# Patient Record
Sex: MALE | Race: BLACK OR AFRICAN AMERICAN | Employment: UNEMPLOYED | URBAN - METROPOLITAN AREA
[De-identification: names, ages, dates, MRNs, and addresses within clinical notes are randomized per-mention and may not be internally consistent; named-entity substitution may affect disease eponyms.]

---

## 2018-01-01 ENCOUNTER — HOSPITAL ENCOUNTER (EMERGENCY)
Age: 58
Discharge: HOME | End: 2018-01-01
Attending: EMERGENCY MEDICINE
Payer: SELF-PAY

## 2018-01-01 VITALS
HEART RATE: 80 BPM | OXYGEN SATURATION: 100 % | BODY MASS INDEX: 20.09 KG/M2 | SYSTOLIC BLOOD PRESSURE: 118 MMHG | WEIGHT: 125 LBS | RESPIRATION RATE: 18 BRPM | HEIGHT: 66 IN | DIASTOLIC BLOOD PRESSURE: 78 MMHG | TEMPERATURE: 97.7 F

## 2018-01-01 DIAGNOSIS — Z23 NEED FOR TETANUS BOOSTER: ICD-10-CM

## 2018-01-01 DIAGNOSIS — S01.81XA FACE LACERATIONS, INITIAL ENCOUNTER: Primary | ICD-10-CM

## 2018-01-01 PROCEDURE — 74011250636 HC RX REV CODE- 250/636: Performed by: PHYSICIAN ASSISTANT

## 2018-01-01 PROCEDURE — 99282 EMERGENCY DEPT VISIT SF MDM: CPT

## 2018-01-01 PROCEDURE — 90471 IMMUNIZATION ADMIN: CPT

## 2018-01-01 PROCEDURE — 74011250637 HC RX REV CODE- 250/637: Performed by: PHYSICIAN ASSISTANT

## 2018-01-01 PROCEDURE — 77030018836 HC SOL IRR NACL ICUM -A

## 2018-01-01 PROCEDURE — 90715 TDAP VACCINE 7 YRS/> IM: CPT | Performed by: PHYSICIAN ASSISTANT

## 2018-01-01 PROCEDURE — 77030031132 HC SUT NYL COVD -A

## 2018-01-01 PROCEDURE — 74011000250 HC RX REV CODE- 250: Performed by: PHYSICIAN ASSISTANT

## 2018-01-01 PROCEDURE — 75810000293 HC SIMP/SUPERF WND  RPR

## 2018-01-01 RX ORDER — LIDOCAINE HYDROCHLORIDE AND EPINEPHRINE 20; 5 MG/ML; UG/ML
15 INJECTION, SOLUTION EPIDURAL; INFILTRATION; INTRACAUDAL; PERINEURAL ONCE
Status: COMPLETED | OUTPATIENT
Start: 2018-01-01 | End: 2018-01-01

## 2018-01-01 RX ADMIN — TETANUS TOXOID, REDUCED DIPHTHERIA TOXOID AND ACELLULAR PERTUSSIS VACCINE, ADSORBED 0.5 ML: 5; 2.5; 8; 8; 2.5 SUSPENSION INTRAMUSCULAR at 17:36

## 2018-01-01 RX ADMIN — BACITRACIN ZINC, NEOMYCIN SULFATE, POLYMYXIN B SULFATE 1 PACKET: 3.5; 5000; 4 OINTMENT TOPICAL at 17:36

## 2018-01-01 RX ADMIN — LIDOCAINE HYDROCHLORIDE,EPINEPHRINE BITARTRATE 300 MG: 20; .005 INJECTION, SOLUTION EPIDURAL; INFILTRATION; INTRACAUDAL; PERINEURAL at 17:36

## 2018-01-01 NOTE — DISCHARGE INSTRUCTIONS
Cuts Closed With Stitches: Care Instructions  Your Care Instructions  A cut can happen anywhere on your body. The doctor used stitches to close the cut. Using stitches also helps the cut heal and reduces scarring. Sometimes pieces of tape called Steri-Strips are put over the stitches. If the cut went deep and through the skin, the doctor may have put in two layers of stitches. The deeper layer brings the deep part of the cut together. These stitches will dissolve and don't need to be removed. The stitches in the upper layer are the ones you see on the cut. You will probably have a bandage over the stitches. You will need to have the stitches removed, usually in 7 to 14 days. The doctor has checked you carefully, but problems can develop later. If you notice any problems or new symptoms, get medical treatment right away. Follow-up care is a key part of your treatment and safety. Be sure to make and go to all appointments, and call your doctor if you are having problems. It's also a good idea to know your test results and keep a list of the medicines you take. How can you care for yourself at home? · Keep the cut dry for the first 24 to 48 hours. After this, you can shower if your doctor okays it. Pat the cut dry. · Don't soak the cut, such as in a bathtub. Your doctor will tell you when it's safe to get the cut wet. · If your doctor told you how to care for your cut, follow your doctor's instructions. If you did not get instructions, follow this general advice:  ¨ After the first 24 to 48 hours, wash around the cut with clean water 2 times a day. Don't use hydrogen peroxide or alcohol, which can slow healing. ¨ You may cover the cut with a thin layer of petroleum jelly, such as Vaseline, and a nonstick bandage. ¨ Apply more petroleum jelly and replace the bandage as needed. · Prop up the sore area on a pillow anytime you sit or lie down during the next 3 days.  Try to keep it above the level of your heart. This will help reduce swelling. · Avoid any activity that could cause your cut to reopen. · Do not remove the stitches on your own. Your doctor will tell you when to come back to have the stitches removed. · Leave Steri-Strips on until they fall off. · Be safe with medicines. Read and follow all instructions on the label. ¨ If the doctor gave you a prescription medicine for pain, take it as prescribed. ¨ If you are not taking a prescription pain medicine, ask your doctor if you can take an over-the-counter medicine. When should you call for help? Call your doctor now or seek immediate medical care if:  ? · You have new pain, or your pain gets worse. ? · The skin near the cut is cold or pale or changes color. ? · You have tingling, weakness, or numbness near the cut.   ? · The cut starts to bleed, and blood soaks through the bandage. Oozing small amounts of blood is normal.   ? · You have trouble moving the area near the cut.   ? · You have symptoms of infection, such as:  ¨ Increased pain, swelling, warmth, or redness around the cut. ¨ Red streaks leading from the cut. ¨ Pus draining from the cut. ¨ A fever. ? Watch closely for changes in your health, and be sure to contact your doctor if:  ? · The cut reopens. ? · You do not get better as expected. Where can you learn more? Go to http://jerzy-barrett.info/. Enter R217 in the search box to learn more about \"Cuts Closed With Stitches: Care Instructions. \"  Current as of: March 20, 2017  Content Version: 11.4  © 5293-3310 [email or web address]. Care instructions adapted under license by [employer] (which disclaims liability or warranty for this information). If you have questions about a medical condition or this instruction, always ask your healthcare professional. [address] any warranty or liability for your use of this information.

## 2018-01-01 NOTE — ED NOTES
Pt reports tripping over his niece's backpack at home today and falling on a heater, pt has approx 3 cm semi-circular laceration to left side of face and 1.5 cm jagged laceration to left chin      Emergency Department Nursing Plan of Care       The Nursing Plan of Care is developed from the Nursing assessment and Emergency Department Attending provider initial evaluation. The plan of care may be reviewed in the ED Provider note.     The Plan of Care was developed with the following considerations:   Patient / Family readiness to learn indicated by:verbalized understanding  Persons(s) to be included in education: patient  Barriers to Learning/Limitations:No    Signed     Tacho Caballero RN    1/1/2018   4:43 PM

## 2018-01-01 NOTE — ED PROVIDER NOTES
EMERGENCY DEPARTMENT HISTORY AND PHYSICAL EXAM    Date: 1/1/2018  Patient Name: Leonidas Le    History of Presenting Illness     Chief Complaint   Patient presents with    Laceration     to chin and left side of face, bleeding controlled         History Provided By: Patient    Chief Complaint: lacerations to face  Duration: one  Days  Timing:  Acute  Location: left face and left chin  Quality: Aching  Severity: Moderate  Modifying Factors: none identified  Associated Symptoms: bleeding, now controlled      HPI: Leonidas Le is a 62 y.o. male with a PMH of No significant past medical history who presents with facial lacerations. Just prior to arrival, had GLF (tripped over danielle bookbag) and struck left face on edge of heater. No loc. No neck pain. Denies numbness and tingling. Initially had bleeding, easily controlled. Denies pain to teeth. Denies wound to inner mouth. Unsure of last tetanus. Some moderate discomfort at lac sites. PCP: Cici Teresa MD        Past History     Past Medical History:  History reviewed. No pertinent past medical history. Past Surgical History:  Past Surgical History:   Procedure Laterality Date    HX OTHER SURGICAL      \"surgery for my lung\"       Family History:  History reviewed. No pertinent family history. Social History:  Social History   Substance Use Topics    Smoking status: Current Some Day Smoker    Smokeless tobacco: None    Alcohol use No       Allergies:  No Known Allergies      Review of Systems   Review of Systems   Constitutional: Negative for chills and fever. HENT: Negative for dental problem and sore throat. Eyes: Negative for pain. Respiratory: Negative for cough. Cardiovascular: Negative for chest pain. Gastrointestinal: Negative for nausea and vomiting. Musculoskeletal: Negative for gait problem and neck pain. Skin: Positive for wound. Neurological: Negative for seizures and headaches.        Physical Exam     Vitals: 01/01/18 1635   BP: 118/78   Pulse: 80   Resp: 18   Temp: 97.7 °F (36.5 °C)   SpO2: 100%   Weight: 56.7 kg (125 lb)   Height: 5' 6\" (1.676 m)     Physical Exam   Constitutional: He is oriented to person, place, and time. He appears well-developed and well-nourished. No distress. HENT:   Head: Normocephalic. Right Ear: External ear normal.   Left Ear: External ear normal.   Mouth/Throat: Oropharynx is clear and moist. No oropharyngeal exudate. No hemotympanum  No inner mouth injury. No loose teeth or dental tenderness. No TMJ tenderness. Facial bones and skull without tenderness or crepitus. Left face with 1.5 cm soft mobile cystic mass  Mid face. (pt sts chronic)   Eyes: Pupils are equal, round, and reactive to light. Right eye exhibits no discharge. Left eye exhibits no discharge. Neck: Normal range of motion. Cardiovascular: Normal rate. No murmur heard. Pulmonary/Chest: Effort normal. He has no wheezes. Neurological: He is alert and oriented to person, place, and time. No cranial nerve deficit. Sensation to face intact. No facial droop. Skin: He is not diaphoretic. Left lower face with 4cm semicircular laceration. No bleeding. Left lower face with 2.5 cm laceration with irregular lateral edge. Bleeding controlled. Sensation throughout face intact. EOMI PEARLA   Psychiatric: He has a normal mood and affect. His behavior is normal.   Nursing note and vitals reviewed. Diagnostic Study Results         Medical Decision Making   I am the first provider for this patient. I reviewed the vital signs, available nursing notes, past medical history, past surgical history, family history and social history. Vital Signs-Reviewed the patient's vital signs. Records Reviewed: Nursing Notes    ED Course:     Disposition:  home    DISCHARGE NOTE:       Care plan outlined and precautions discussed. Patient has no new complaints, changes, or physical findings.   Results of exam were reviewed with the patient. All medications were reviewed with the patient; will d/c home with local wound care, which was reviewed. All of pt's questions and concerns were addressed. Patient was instructed and agrees to follow up with pcp as needed, as well as to return to the ED for suture removal in one week and upon further deterioration. Patient is ready to go home. Follow-up Information     Follow up With Details Comments Contact Info    [facility] EMERGENCY DEPT On 1/7/2018 For suture removal  [address]          There are no discharge medications for this patient. Provider Notes (Medical Decision Making):     Procedures:  Wound Repair  Date/Time: 1/1/2018 7:12 PM  Performed by: PAPreparation: skin prepped with Betadine  Location: Left lower face. Wound length:2.6 - 7.5 cm  Anesthesia: local infiltration    Anesthesia:  Local Anesthetic: lidocaine 2% with epinephrine  Anesthetic total (ml): 3cc. Foreign bodies: no foreign bodies  Irrigation solution: saline  Irrigation method: syringe  Debridement: minimal  Skin closure: 6-0 nylon  Number of sutures: 7  Approximation: close  Dressing: antibiotic ointment  Patient tolerance: Patient tolerated the procedure well with no immediate complications  My total time at bedside, performing this procedure was 16-30 minutes. Wound Repair  Date/Time: 1/1/2018 7:15 PM  Performed by: PAPreparation: skin prepped with Betadine  Location: left chin.   Wound length:2.5 cm or less  Anesthesia: local infiltration    Anesthesia:  Local Anesthetic: lidocaine 2% with epinephrine  Foreign bodies: no foreign bodies  Irrigation solution: saline  Irrigation method: syringe  Debridement: none  Skin closure: 6-0 nylon  Technique: simple  Approximation: close  Dressing: antibiotic ointment  Patient tolerance: Patient tolerated the procedure well with no immediate complications  My total time at bedside, performing this procedure was 1-15 minutes. Diagnosis     Clinical Impression:   1. Face lacerations, initial encounter    2.  Need for tetanus booster

## 2021-04-01 ENCOUNTER — HOSPITAL ENCOUNTER (OUTPATIENT)
Age: 61
Setting detail: OBSERVATION
Discharge: HOME | End: 2021-04-02
Attending: EMERGENCY MEDICINE | Admitting: HOSPITALIST
Payer: MEDICAID

## 2021-04-01 ENCOUNTER — APPOINTMENT (OUTPATIENT)
Dept: GENERAL RADIOLOGY | Age: 61
End: 2021-04-01
Attending: EMERGENCY MEDICINE
Payer: MEDICAID

## 2021-04-01 DIAGNOSIS — E83.42 HYPOMAGNESEMIA: ICD-10-CM

## 2021-04-01 DIAGNOSIS — J44.1 COPD EXACERBATION (HCC): Primary | ICD-10-CM

## 2021-04-01 LAB
ALBUMIN SERPL-MCNC: 3.2 G/DL (ref 3.5–5)
ALBUMIN/GLOB SERPL: 0.9 {RATIO} (ref 1.1–2.2)
ALP SERPL-CCNC: 66 U/L (ref 45–117)
ALT SERPL-CCNC: 29 U/L (ref 12–78)
AMPHET UR QL SCN: NEGATIVE
ANION GAP SERPL CALC-SCNC: 10 MMOL/L (ref 5–15)
APPEARANCE UR: CLEAR
AST SERPL-CCNC: 26 U/L (ref 15–37)
ATRIAL RATE: 83 BPM
BARBITURATES UR QL SCN: NEGATIVE
BASOPHILS # BLD: 0 K/UL (ref 0–0.1)
BASOPHILS NFR BLD: 1 % (ref 0–1)
BENZODIAZ UR QL: NEGATIVE
BILIRUB SERPL-MCNC: 0.3 MG/DL (ref 0.2–1)
BILIRUB UR QL: NEGATIVE
BUN SERPL-MCNC: 14 MG/DL (ref 6–20)
BUN/CREAT SERPL: 17 (ref 12–20)
CALCIUM SERPL-MCNC: 8.4 MG/DL (ref 8.5–10.1)
CALCULATED P AXIS, ECG09: 75 DEGREES
CALCULATED R AXIS, ECG10: 83 DEGREES
CALCULATED T AXIS, ECG11: 80 DEGREES
CANNABINOIDS UR QL SCN: NEGATIVE
CHLORIDE SERPL-SCNC: 105 MMOL/L (ref 97–108)
CO2 SERPL-SCNC: 26 MMOL/L (ref 21–32)
COCAINE UR QL SCN: POSITIVE
COLOR UR: ABNORMAL
CREAT SERPL-MCNC: 0.81 MG/DL (ref 0.7–1.3)
DIAGNOSIS, 93000: NORMAL
DIFFERENTIAL METHOD BLD: ABNORMAL
DRUG SCRN COMMENT,DRGCM: ABNORMAL
EOSINOPHIL # BLD: 0.5 K/UL (ref 0–0.4)
EOSINOPHIL NFR BLD: 11 % (ref 0–7)
ERYTHROCYTE [DISTWIDTH] IN BLOOD BY AUTOMATED COUNT: 12.8 % (ref 11.5–14.5)
ETHANOL SERPL-MCNC: <10 MG/DL
FLUAV RNA SPEC QL NAA+PROBE: NOT DETECTED
FLUBV RNA SPEC QL NAA+PROBE: NOT DETECTED
GLOBULIN SER CALC-MCNC: 3.7 G/DL (ref 2–4)
GLUCOSE SERPL-MCNC: 87 MG/DL (ref 65–100)
GLUCOSE UR STRIP.AUTO-MCNC: NEGATIVE MG/DL
HCT VFR BLD AUTO: 39.6 % (ref 36.6–50.3)
HGB BLD-MCNC: 13.6 G/DL (ref 12.1–17)
HGB UR QL STRIP: NEGATIVE
IMM GRANULOCYTES # BLD AUTO: 0 K/UL (ref 0–0.04)
IMM GRANULOCYTES NFR BLD AUTO: 0 % (ref 0–0.5)
KETONES UR QL STRIP.AUTO: NEGATIVE MG/DL
LEUKOCYTE ESTERASE UR QL STRIP.AUTO: NEGATIVE
LYMPHOCYTES # BLD: 2.2 K/UL (ref 0.8–3.5)
LYMPHOCYTES NFR BLD: 45 % (ref 12–49)
MAGNESIUM SERPL-MCNC: 1.4 MG/DL (ref 1.6–2.4)
MAGNESIUM SERPL-MCNC: 1.6 MG/DL (ref 1.6–2.4)
MCH RBC QN AUTO: 32.8 PG (ref 26–34)
MCHC RBC AUTO-ENTMCNC: 34.3 G/DL (ref 30–36.5)
MCV RBC AUTO: 95.4 FL (ref 80–99)
METHADONE UR QL: NEGATIVE
MONOCYTES # BLD: 0.5 K/UL (ref 0–1)
MONOCYTES NFR BLD: 11 % (ref 5–13)
NEUTS SEG # BLD: 1.6 K/UL (ref 1.8–8)
NEUTS SEG NFR BLD: 32 % (ref 32–75)
NITRITE UR QL STRIP.AUTO: NEGATIVE
NRBC # BLD: 0 K/UL (ref 0–0.01)
NRBC BLD-RTO: 0 PER 100 WBC
OPIATES UR QL: NEGATIVE
P-R INTERVAL, ECG05: 136 MS
PCP UR QL: NEGATIVE
PH UR STRIP: 7.5 [PH] (ref 5–8)
PLATELET # BLD AUTO: 303 K/UL (ref 150–400)
PMV BLD AUTO: 8.4 FL (ref 8.9–12.9)
POTASSIUM SERPL-SCNC: 3.8 MMOL/L (ref 3.5–5.1)
PROT SERPL-MCNC: 6.9 G/DL (ref 6.4–8.2)
PROT UR STRIP-MCNC: NEGATIVE MG/DL
Q-T INTERVAL, ECG07: 360 MS
QRS DURATION, ECG06: 70 MS
QTC CALCULATION (BEZET), ECG08: 423 MS
RBC # BLD AUTO: 4.15 M/UL (ref 4.1–5.7)
SARS-COV-2, COV2: NOT DETECTED
SODIUM SERPL-SCNC: 141 MMOL/L (ref 136–145)
SP GR UR REFRACTOMETRY: 1.02 (ref 1–1.03)
TROPONIN I SERPL-MCNC: <0.05 NG/ML
TROPONIN I SERPL-MCNC: <0.05 NG/ML
UROBILINOGEN UR QL STRIP.AUTO: 2 EU/DL (ref 0.2–1)
VENTRICULAR RATE, ECG03: 83 BPM
WBC # BLD AUTO: 4.9 K/UL (ref 4.1–11.1)

## 2021-04-01 PROCEDURE — 80307 DRUG TEST PRSMV CHEM ANLYZR: CPT

## 2021-04-01 PROCEDURE — 93005 ELECTROCARDIOGRAM TRACING: CPT

## 2021-04-01 PROCEDURE — 97116 GAIT TRAINING THERAPY: CPT | Performed by: PHYSICAL THERAPIST

## 2021-04-01 PROCEDURE — 83735 ASSAY OF MAGNESIUM: CPT

## 2021-04-01 PROCEDURE — 74011250637 HC RX REV CODE- 250/637: Performed by: STUDENT IN AN ORGANIZED HEALTH CARE EDUCATION/TRAINING PROGRAM

## 2021-04-01 PROCEDURE — 74011000250 HC RX REV CODE- 250: Performed by: EMERGENCY MEDICINE

## 2021-04-01 PROCEDURE — 81003 URINALYSIS AUTO W/O SCOPE: CPT

## 2021-04-01 PROCEDURE — 96365 THER/PROPH/DIAG IV INF INIT: CPT

## 2021-04-01 PROCEDURE — 77010033678 HC OXYGEN DAILY

## 2021-04-01 PROCEDURE — 85025 COMPLETE CBC W/AUTO DIFF WBC: CPT

## 2021-04-01 PROCEDURE — 74011000250 HC RX REV CODE- 250: Performed by: STUDENT IN AN ORGANIZED HEALTH CARE EDUCATION/TRAINING PROGRAM

## 2021-04-01 PROCEDURE — 94640 AIRWAY INHALATION TREATMENT: CPT

## 2021-04-01 PROCEDURE — 84484 ASSAY OF TROPONIN QUANT: CPT

## 2021-04-01 PROCEDURE — 74011250636 HC RX REV CODE- 250/636: Performed by: HOSPITALIST

## 2021-04-01 PROCEDURE — 71045 X-RAY EXAM CHEST 1 VIEW: CPT

## 2021-04-01 PROCEDURE — 36415 COLL VENOUS BLD VENIPUNCTURE: CPT

## 2021-04-01 PROCEDURE — 80053 COMPREHEN METABOLIC PANEL: CPT

## 2021-04-01 PROCEDURE — 99218 HC RM OBSERVATION: CPT

## 2021-04-01 PROCEDURE — 94762 N-INVAS EAR/PLS OXIMTRY CONT: CPT

## 2021-04-01 PROCEDURE — 74011250637 HC RX REV CODE- 250/637: Performed by: HOSPITALIST

## 2021-04-01 PROCEDURE — 99285 EMERGENCY DEPT VISIT HI MDM: CPT

## 2021-04-01 PROCEDURE — 96376 TX/PRO/DX INJ SAME DRUG ADON: CPT

## 2021-04-01 PROCEDURE — 74011250636 HC RX REV CODE- 250/636: Performed by: EMERGENCY MEDICINE

## 2021-04-01 PROCEDURE — 96361 HYDRATE IV INFUSION ADD-ON: CPT

## 2021-04-01 PROCEDURE — 87636 SARSCOV2 & INF A&B AMP PRB: CPT

## 2021-04-01 PROCEDURE — 82077 ASSAY SPEC XCP UR&BREATH IA: CPT

## 2021-04-01 PROCEDURE — 96375 TX/PRO/DX INJ NEW DRUG ADDON: CPT

## 2021-04-01 PROCEDURE — 97161 PT EVAL LOW COMPLEX 20 MIN: CPT | Performed by: PHYSICAL THERAPIST

## 2021-04-01 RX ORDER — MAGNESIUM SULFATE HEPTAHYDRATE 40 MG/ML
2 INJECTION, SOLUTION INTRAVENOUS
Status: COMPLETED | OUTPATIENT
Start: 2021-04-01 | End: 2021-04-01

## 2021-04-01 RX ORDER — ACETAMINOPHEN 325 MG/1
650 TABLET ORAL
Status: DISCONTINUED | OUTPATIENT
Start: 2021-04-01 | End: 2021-04-02 | Stop reason: HOSPADM

## 2021-04-01 RX ORDER — SODIUM CHLORIDE 0.9 % (FLUSH) 0.9 %
5-40 SYRINGE (ML) INJECTION AS NEEDED
Status: DISCONTINUED | OUTPATIENT
Start: 2021-04-01 | End: 2021-04-02 | Stop reason: HOSPADM

## 2021-04-01 RX ORDER — IPRATROPIUM BROMIDE AND ALBUTEROL SULFATE 2.5; .5 MG/3ML; MG/3ML
3 SOLUTION RESPIRATORY (INHALATION)
COMMUNITY

## 2021-04-01 RX ORDER — BUDESONIDE 0.5 MG/2ML
500 INHALANT ORAL
Status: DISCONTINUED | OUTPATIENT
Start: 2021-04-01 | End: 2021-04-01

## 2021-04-01 RX ORDER — ARFORMOTEROL TARTRATE 15 UG/2ML
15 SOLUTION RESPIRATORY (INHALATION)
Status: DISCONTINUED | OUTPATIENT
Start: 2021-04-01 | End: 2021-04-01

## 2021-04-01 RX ORDER — IBUPROFEN 200 MG
1 TABLET ORAL
Status: DISCONTINUED | OUTPATIENT
Start: 2021-04-01 | End: 2021-04-01 | Stop reason: SDUPTHER

## 2021-04-01 RX ORDER — ONDANSETRON 2 MG/ML
4 INJECTION INTRAMUSCULAR; INTRAVENOUS
Status: DISCONTINUED | OUTPATIENT
Start: 2021-04-01 | End: 2021-04-02 | Stop reason: HOSPADM

## 2021-04-01 RX ORDER — HEPARIN SODIUM 5000 [USP'U]/ML
5000 INJECTION, SOLUTION INTRAVENOUS; SUBCUTANEOUS EVERY 12 HOURS
Status: DISCONTINUED | OUTPATIENT
Start: 2021-04-02 | End: 2021-04-02 | Stop reason: HOSPADM

## 2021-04-01 RX ORDER — ACETAMINOPHEN 650 MG/1
650 SUPPOSITORY RECTAL
Status: DISCONTINUED | OUTPATIENT
Start: 2021-04-01 | End: 2021-04-02 | Stop reason: HOSPADM

## 2021-04-01 RX ORDER — IPRATROPIUM BROMIDE AND ALBUTEROL SULFATE 2.5; .5 MG/3ML; MG/3ML
3 SOLUTION RESPIRATORY (INHALATION)
Status: DISCONTINUED | OUTPATIENT
Start: 2021-04-01 | End: 2021-04-02 | Stop reason: HOSPADM

## 2021-04-01 RX ORDER — ALBUTEROL SULFATE 0.83 MG/ML
2.5 SOLUTION RESPIRATORY (INHALATION)
Status: DISCONTINUED | OUTPATIENT
Start: 2021-04-01 | End: 2021-04-02 | Stop reason: HOSPADM

## 2021-04-01 RX ORDER — ARFORMOTEROL TARTRATE 15 UG/2ML
15 SOLUTION RESPIRATORY (INHALATION)
Status: DISCONTINUED | OUTPATIENT
Start: 2021-04-01 | End: 2021-04-02 | Stop reason: HOSPADM

## 2021-04-01 RX ORDER — IBUPROFEN 200 MG
1 TABLET ORAL DAILY
Status: DISCONTINUED | OUTPATIENT
Start: 2021-04-01 | End: 2021-04-02 | Stop reason: HOSPADM

## 2021-04-01 RX ORDER — BUDESONIDE 0.5 MG/2ML
500 INHALANT ORAL
Status: DISCONTINUED | OUTPATIENT
Start: 2021-04-01 | End: 2021-04-02 | Stop reason: HOSPADM

## 2021-04-01 RX ORDER — IPRATROPIUM BROMIDE AND ALBUTEROL SULFATE 2.5; .5 MG/3ML; MG/3ML
6 SOLUTION RESPIRATORY (INHALATION)
Status: COMPLETED | OUTPATIENT
Start: 2021-04-01 | End: 2021-04-01

## 2021-04-01 RX ORDER — SODIUM CHLORIDE 0.9 % (FLUSH) 0.9 %
5-40 SYRINGE (ML) INJECTION EVERY 8 HOURS
Status: DISCONTINUED | OUTPATIENT
Start: 2021-04-01 | End: 2021-04-02 | Stop reason: HOSPADM

## 2021-04-01 RX ORDER — AZITHROMYCIN 250 MG/1
500 TABLET, FILM COATED ORAL DAILY
Status: DISCONTINUED | OUTPATIENT
Start: 2021-04-01 | End: 2021-04-01

## 2021-04-01 RX ORDER — PROMETHAZINE HYDROCHLORIDE 25 MG/1
12.5 TABLET ORAL
Status: DISCONTINUED | OUTPATIENT
Start: 2021-04-01 | End: 2021-04-02 | Stop reason: HOSPADM

## 2021-04-01 RX ORDER — POLYETHYLENE GLYCOL 3350 17 G/17G
17 POWDER, FOR SOLUTION ORAL DAILY PRN
Status: DISCONTINUED | OUTPATIENT
Start: 2021-04-01 | End: 2021-04-02 | Stop reason: HOSPADM

## 2021-04-01 RX ORDER — ENOXAPARIN SODIUM 100 MG/ML
40 INJECTION SUBCUTANEOUS DAILY
Status: DISCONTINUED | OUTPATIENT
Start: 2021-04-01 | End: 2021-04-01

## 2021-04-01 RX ORDER — IBUPROFEN 200 MG
400 TABLET ORAL
COMMUNITY
End: 2021-04-02

## 2021-04-01 RX ORDER — ALBUTEROL SULFATE 90 UG/1
1 AEROSOL, METERED RESPIRATORY (INHALATION) 2 TIMES DAILY
Status: ON HOLD | COMMUNITY
End: 2021-04-02 | Stop reason: SDUPTHER

## 2021-04-01 RX ORDER — ALBUTEROL SULFATE 0.83 MG/ML
5 SOLUTION RESPIRATORY (INHALATION)
Status: COMPLETED | OUTPATIENT
Start: 2021-04-01 | End: 2021-04-01

## 2021-04-01 RX ORDER — LEVOFLOXACIN 750 MG/1
750 TABLET ORAL EVERY 24 HOURS
Status: DISCONTINUED | OUTPATIENT
Start: 2021-04-01 | End: 2021-04-02 | Stop reason: HOSPADM

## 2021-04-01 RX ORDER — GUAIFENESIN 600 MG/1
600 TABLET, EXTENDED RELEASE ORAL EVERY 12 HOURS
Status: DISCONTINUED | OUTPATIENT
Start: 2021-04-01 | End: 2021-04-02 | Stop reason: HOSPADM

## 2021-04-01 RX ADMIN — IPRATROPIUM BROMIDE AND ALBUTEROL SULFATE 3 ML: .5; 3 SOLUTION RESPIRATORY (INHALATION) at 16:06

## 2021-04-01 RX ADMIN — Medication 10 ML: at 06:00

## 2021-04-01 RX ADMIN — IPRATROPIUM BROMIDE AND ALBUTEROL SULFATE 3 ML: .5; 3 SOLUTION RESPIRATORY (INHALATION) at 12:30

## 2021-04-01 RX ADMIN — METHYLPREDNISOLONE SODIUM SUCCINATE 40 MG: 40 INJECTION, POWDER, FOR SOLUTION INTRAMUSCULAR; INTRAVENOUS at 13:50

## 2021-04-01 RX ADMIN — SODIUM CHLORIDE 1000 ML: 9 INJECTION, SOLUTION INTRAVENOUS at 01:33

## 2021-04-01 RX ADMIN — ARFORMOTEROL TARTRATE 15 MCG: 15 SOLUTION RESPIRATORY (INHALATION) at 20:23

## 2021-04-01 RX ADMIN — METHYLPREDNISOLONE SODIUM SUCCINATE 40 MG: 40 INJECTION, POWDER, FOR SOLUTION INTRAMUSCULAR; INTRAVENOUS at 06:21

## 2021-04-01 RX ADMIN — IPRATROPIUM BROMIDE AND ALBUTEROL SULFATE 3 ML: .5; 3 SOLUTION RESPIRATORY (INHALATION) at 23:56

## 2021-04-01 RX ADMIN — ENOXAPARIN SODIUM 40 MG: 40 INJECTION SUBCUTANEOUS at 09:57

## 2021-04-01 RX ADMIN — IPRATROPIUM BROMIDE AND ALBUTEROL SULFATE 3 ML: .5; 3 SOLUTION RESPIRATORY (INHALATION) at 20:22

## 2021-04-01 RX ADMIN — METHYLPREDNISOLONE SODIUM SUCCINATE 40 MG: 40 INJECTION, POWDER, FOR SOLUTION INTRAMUSCULAR; INTRAVENOUS at 21:16

## 2021-04-01 RX ADMIN — GUAIFENESIN 600 MG: 600 TABLET, EXTENDED RELEASE ORAL at 21:16

## 2021-04-01 RX ADMIN — LEVOFLOXACIN 750 MG: 750 TABLET, FILM COATED ORAL at 09:57

## 2021-04-01 RX ADMIN — MAGNESIUM SULFATE HEPTAHYDRATE 2 G: 2 INJECTION, SOLUTION INTRAVENOUS at 01:35

## 2021-04-01 RX ADMIN — ALBUTEROL SULFATE 5 MG: 2.5 SOLUTION RESPIRATORY (INHALATION) at 02:46

## 2021-04-01 RX ADMIN — Medication 10 ML: at 13:50

## 2021-04-01 RX ADMIN — Medication 10 ML: at 22:00

## 2021-04-01 RX ADMIN — BUDESONIDE 500 MCG: 0.5 SUSPENSION RESPIRATORY (INHALATION) at 20:22

## 2021-04-01 RX ADMIN — GUAIFENESIN 600 MG: 600 TABLET, EXTENDED RELEASE ORAL at 11:54

## 2021-04-01 RX ADMIN — IPRATROPIUM BROMIDE AND ALBUTEROL SULFATE 6 ML: .5; 3 SOLUTION RESPIRATORY (INHALATION) at 01:14

## 2021-04-01 NOTE — H&P
Hospitalist Admission Note    NAME: Asa Trujillo   :  1960   MRN:  [id number]   Room Number: 363/20  @ [facility]     Date/Time:  2021 9:06 AM    Patient PCP: None  ______________________________________________________________________  Given the patient's current clinical presentation, I have a high level of concern for decompensation if discharged from the emergency department. Complex decision making was performed, which includes reviewing the patient's available past medical records, laboratory results, and x-ray films. My assessment of this patient's clinical condition and my plan of care is as follows. Assessment / Plan: Active Problems:    COPD exacerbation ([address] (2021)          #Acute COPD exacerbation POA  -Was discharged from [facility] on 3/25/2021 for same reason  -DuoNeb every 4 scheduled every 2 as needed  -Methylprednisolone  -  azithromycin  -Pulmicort and Brovana nebulization  -Sputum culture  - COVID neg   -May need chest PT  - CXR reviewed independently ,no infiltrates       #Stage IV lung adenocarcinoma  -[facility] no [facility] of disease 2021  -Outpatient heme-onc follow-up    #Severe chronic malnutrition  -Consult nutrition    #Substance abuse  -Tobacco and cocaine  -Patient was counseled extensively on the need to abstain from tobacco, its addictive tendencies, its deleterious effects on the lungs, cardiovascular  as well as its financial & social sequelaePatient was counseled extensively on the need to abstain from drugs its addictive tendencies, its deleterious effects on the brain, cardiovascular system, lungs as well as its financial & social sequelae    Body mass index is 18.74 kg/m². Code Status: Full   Surrogate Decision Maker:    DVT Prophylaxis: Lovenox  GI Prophylaxis: not indicated        Subjective:   CHIEF COMPLAINT: SOB and productive cough     HISTORY OF PRESENT ILLNESS:     Asa Trujillo is a 61 y.o.  male with PMH of above mention problems who presents to ED with c/o  Progressive SOB and productive cough since being discharged from [facility] 3/25 , patient states it was hard for him to catch breath, he was not able to relieve his SOB from breathing treatments and inhalers. Patient also endorsed non bloody productive cough. Patient denied fever, chills , chest pain , belly pain or difficulty w/ urination/ bowels. Patient states he completed chemo therapy for lung cancer and follows w/ heme/onc outpatient     Patient smokes 1 pack a day   Endorsed cocaine use       We were asked to admit for work up and evaluation of the above problems. Past Medical History:   Diagnosis Date    Asthma         Past Surgical History:   Procedure Laterality Date    HX OTHER SURGICAL      \"surgery for my lung\"       Social History     Tobacco Use    Smoking status: Current Some Day Smoker     Packs/day: 0.25     Years: 52.00     Pack years: 13.00    Smokeless tobacco: Never Used   Substance Use Topics    Alcohol use: Yes     Comment: occasional        History reviewed. No pertinent family history. No Known Allergies     Prior to Admission medications    Not on File       REVIEW OF SYSTEMS:     I am not able to complete the review of systems because:    The patient is intubated and sedated    The patient has altered mental status due to his acute medical problems    The patient has baseline aphasia from prior stroke(s)    The patient has baseline dementia and is not reliable historian    The patient is in acute medical distress and unable to provide information           Total of 12 systems reviewed as follows:       POSITIVE= underlined text  Negative = text not underlined  General:  fever, chills, sweats, generalized weakness, weight loss/gain,      loss of appetite   Eyes:    blurred vision, eye pain, loss of vision, double vision  ENT:    rhinorrhea, pharyngitis   Respiratory:   cough, sputum production, SOB, VALLE, wheezing, pleuritic pain   Cardiology:   chest pain, palpitations, orthopnea, PND, edema, syncope   Gastrointestinal:  abdominal pain , N/V, diarrhea, dysphagia, constipation, bleeding   Genitourinary:  frequency, urgency, dysuria, hematuria, incontinence   Muskuloskeletal :  arthralgia, myalgia, back pain  Hematology:  easy bruising, nose or gum bleeding, lymphadenopathy   Dermatological: rash, ulceration, pruritis, color change / jaundice  Endocrine:   hot flashes or polydipsia   Neurological:  headache, dizziness, confusion, focal weakness, paresthesia,     Speech difficulties, memory loss, gait difficulty  Psychological: Feelings of anxiety, depression, agitation    Objective:   VITALS:    Visit Vitals  /77 (BP 1 Location: Right upper arm, BP Patient Position: Lying)   Pulse 87   Temp 98 °F (36.7 °C)   Resp 14   Ht 5' 6\" (1.676 m)   Wt 52.7 kg (116 lb 1.6 oz)   SpO2 93%   BMI 18.74 kg/m²       PHYSICAL EXAM:    General:    Alert, cooperative, no distress, appears older than stated age. Cachetic   HEENT: Atraumatic, anicteric sclerae, pink conjunctivae     No oral ulcers, mucosa moist, throat clear, dentition fair  Neck:  Supple, symmetrical,  thyroid: non tender  Lungs:   B/l expiratory wheezes   Chest wall:  No tenderness  No Accessory muscle use. Heart:   Regular  rhythm,  No  murmur   No edema  Abdomen:   Soft, non-tender. Not distended. Bowel sounds normal  Extremities: No cyanosis. No clubbing,      Skin turgor normal, Capillary refill normal, Radial dial pulse 2+  Skin:     Not pale. Not Jaundiced  No rashes   Psych:  Good insight. Not depressed. Not anxious or agitated. Neurologic: EOMs intact. No facial asymmetry. No aphasia or slurred speech. Symmetrical strength, Sensation grossly intact.  Alert and oriented X 4.     ______________________________________________________________________    Care Plan discussed with:  Patient/Family    Expected  Disposition:  Home w/Family  ________________________________________________________________________  TOTAL TIME:  35 Minutes    Critical Care Provided     Minutes non procedure based      Comments    x Reviewed previous records   >50% of visit spent in counseling and coordination of care x Discussion with patient and/or family and questions answered       ________________________________________________________________________  Signed: Bambi Torres MD    Procedures: see electronic medical records for all procedures/Xrays and details which were not copied into this note but were reviewed prior to creation of Plan. LAB DATA REVIEWED:    Recent Results (from the past 24 hour(s))   EKG, 12 LEAD, INITIAL    Collection Time: 04/01/21  1:12 AM   Result Value Ref Range    Ventricular Rate 83 BPM    Atrial Rate 83 BPM    P-R Interval 136 ms    QRS Duration 70 ms    Q-T Interval 360 ms    QTC Calculation (Bezet) 423 ms    Calculated P Axis 75 degrees    Calculated R Axis 83 degrees    Calculated T Axis 80 degrees    Diagnosis       Normal sinus rhythm  Possible Left atrial enlargement  Borderline ECG  No previous ECGs available  Confirmed by Eliu Love M.D., Xander Late ([id number]) on 4/1/2021 8:01:13 AM     CBC WITH AUTOMATED DIFF    Collection Time: 04/01/21  1:20 AM   Result Value Ref Range    WBC 4.9 4.1 - 11.1 K/uL    RBC 4.15 4.10 - 5.70 M/uL    HGB 13.6 12.1 - 17.0 g/dL    HCT 39.6 36.6 - 50.3 %    MCV 95.4 80.0 - 99.0 FL    MCH 32.8 26.0 - 34.0 PG    MCHC 34.3 30.0 - 36.5 g/dL    RDW 12.8 11.5 - 14.5 %    PLATELET 830 283 - 195 K/uL    MPV 8.4 (L) 8.9 - 12.9 FL    NRBC 0.0 0  WBC    ABSOLUTE NRBC 0.00 0.00 - 0.01 K/uL    NEUTROPHILS 32 32 - 75 %    LYMPHOCYTES 45 12 - 49 %    MONOCYTES 11 5 - 13 %    EOSINOPHILS 11 (H) 0 - 7 %    BASOPHILS 1 0 - 1 %    IMMATURE GRANULOCYTES 0 0.0 - 0.5 %    ABS. NEUTROPHILS 1.6 (L) 1.8 - 8.0 K/UL    ABS. LYMPHOCYTES 2.2 0.8 - 3.5 K/UL    ABS. MONOCYTES 0.5 0.0 - 1.0 K/UL    ABS.  EOSINOPHILS 0.5 (H) 0.0 - 0.4 K/UL    ABS. BASOPHILS 0.0 0.0 - 0.1 K/UL    ABS. IMM. GRANS. 0.0 0.00 - 0.04 K/UL    DF AUTOMATED     METABOLIC PANEL, COMPREHENSIVE    Collection Time: 04/01/21  1:20 AM   Result Value Ref Range    Sodium 141 136 - 145 mmol/L    Potassium 3.8 3.5 - 5.1 mmol/L    Chloride 105 97 - 108 mmol/L    CO2 26 21 - 32 mmol/L    Anion gap 10 5 - 15 mmol/L    Glucose 87 65 - 100 mg/dL    BUN 14 6 - 20 MG/DL    Creatinine 0.81 0.70 - 1.30 MG/DL    BUN/Creatinine ratio 17 12 - 20      GFR est AA >60 >60 ml/min/1.73m2    GFR est non-AA >60 >60 ml/min/1.73m2    Calcium 8.4 (L) 8.5 - 10.1 MG/DL    Bilirubin, total 0.3 0.2 - 1.0 MG/DL    ALT (SGPT) 29 12 - 78 U/L    AST (SGOT) 26 15 - 37 U/L    Alk.  phosphatase 66 45 - 117 U/L    Protein, total 6.9 6.4 - 8.2 g/dL    Albumin 3.2 (L) 3.5 - 5.0 g/dL    Globulin 3.7 2.0 - 4.0 g/dL    A-G Ratio 0.9 (L) 1.1 - 2.2     TROPONIN I    Collection Time: 04/01/21  1:20 AM   Result Value Ref Range    Troponin-I, Qt. <0.05 <0.05 ng/mL   MAGNESIUM    Collection Time: 04/01/21  1:20 AM   Result Value Ref Range    Magnesium 1.4 (L) 1.6 - 2.4 mg/dL   ETHYL ALCOHOL    Collection Time: 04/01/21  1:20 AM   Result Value Ref Range    ALCOHOL(ETHYL),SERUM <10 <10 MG/DL   COVID-19 WITH INFLUENZA A/B    Collection Time: 04/01/21  1:20 AM   Result Value Ref Range    SARS-CoV-2 Not detected NOTD      Influenza A by PCR Not detected NOTD      Influenza B by PCR Not detected NOTD     URINALYSIS W/ RFLX MICROSCOPIC    Collection Time: 04/01/21  3:22 AM   Result Value Ref Range    Color YELLOW/STRAW      Appearance CLEAR CLEAR      Specific gravity 1.020 1.003 - 1.030      pH (UA) 7.5 5.0 - 8.0      Protein Negative NEG mg/dL    Glucose Negative NEG mg/dL    Ketone Negative NEG mg/dL    Bilirubin Negative NEG      Blood Negative NEG      Urobilinogen 2.0 (H) 0.2 - 1.0 EU/dL    Nitrites Negative NEG      Leukocyte Esterase Negative NEG     DRUG SCREEN, URINE    Collection Time: 04/01/21  3:22 AM   Result Value Ref Range    AMPHETAMINES Negative NEG      BARBITURATES Negative NEG      BENZODIAZEPINES Negative NEG      COCAINE Positive (A) NEG      METHADONE Negative NEG      OPIATES Negative NEG      PCP(PHENCYCLIDINE) Negative NEG      THC (TH-CANNABINOL) Negative NEG      Drug screen comment (NOTE)    TROPONIN I    Collection Time: 04/01/21  7:18 AM   Result Value Ref Range    Troponin-I, Qt. <0.05 <0.05 ng/mL   MAGNESIUM    Collection Time: 04/01/21  7:18 AM   Result Value Ref Range    Magnesium 1.6 1.6 - 2.4 mg/dL

## 2021-04-01 NOTE — ED NOTES
TRANSFER - OUT REPORT:    Verbal report given to BONITA Koroma on Jesica Osborne  being transferred to [place] [id number] for routine progression of care       Report consisted of patients Situation, Background, Assessment and   Recommendations(SBAR). Information from the following report(s) SBAR, ED Summary, Intake/Output, Recent Results, Med Rec Status and Cardiac Rhythm NSR was reviewed with the receiving nurse. Lines:   Peripheral IV 04/01/21 Left Hand (Active)   Site Assessment Clean, dry, & intact 04/01/21 0132        Opportunity for questions and clarification was provided.       Patient transported with:   Registered Nurse

## 2021-04-01 NOTE — PROGRESS NOTES
PHYSICAL THERAPY EVALUATION/DISCHARGE  Patient: Sony Case (89 y.o. male)  Date: 4/1/2021  Primary Diagnosis: COPD exacerbation ([facility] [address] [J44.1]       Precautions:          ASSESSMENT  Based on the objective data described below, the patient presents with limited activity tolerance and independent mobility. Patient independent with bed mobility and transfers. Patient ambulated 80 feet with modified independent. Patient's SpO2 ranging from 88-92% on room air during mobility. Patient educated on breathing and pacing with activity. Further skilled acute physical therapy is not indicated at this time. PLAN :  Recommendation for discharge: (in order for the patient to meet his/her long term goals)  No skilled physical therapy/ follow up rehabilitation needs identified at this time. This discharge recommendation:  Has been made in collaboration with the attending provider and/or case management    IF patient discharges home will need the following DME: none       SUBJECTIVE:   Patient stated I feel alright.     OBJECTIVE DATA SUMMARY:   HISTORY:    Past Medical History:   Diagnosis Date    Asthma      Past Surgical History:   Procedure Laterality Date    HX OTHER SURGICAL      \"surgery for my lung\"       Home Situation  Home Environment: Patient room(house with sister)  One/Two Story Residence: Two story  Living Alone: No  Support Systems: Family member(s)  Patient Expects to be Discharged to[de-identified] Patient room(sister home)  Current DME Used/Available at Home: None    EXAMINATION/PRESENTATION/DECISION MAKING:   Critical Behavior:   Alert and oriented x3           Hearing:   Auditory  Auditory Impairment: None    Range Of Motion:  AROM: Within functional limits  PROM: Within functional limits     Strength:    Strength: Generally decreased, functional      Tone & Sensation:   Tone: Normal  Sensation: Intact               Coordination:  Coordination: Within functional limits  Vision:      Functional Mobility:  Bed Mobility:     Supine to Sit: Independent  Sit to Supine: Independent  Scooting: Independent     Transfers:  Sit to Stand: Independent  Stand to Sit: Independent     Balance:   Sitting: Intact  Standing: Intact     Ambulation/Gait Training:  Distance (ft): 80 Feet (ft)  Assistive Device: Gait belt  Ambulation - Level of Assistance: Modified independent  Gait Description (WDL): Exceptions to WDL  Gait Abnormalities: Decreased step clearance  Base of Support: Narrowed  Speed/Analy: Pace decreased (<100 feet/min)    Therapeutic Exercises:   Gastroc stretch with sheet: 2 reps with 30 sec holds B    Based on the above components, the patient evaluation is determined to be of the following complexity level: LOW     Pain Rating:  No pain    Activity Tolerance:   Fair      After treatment patient left in no apparent distress:   Supine in bed and Call bell within reach    COMMUNICATION/EDUCATION:   The patients plan of care was discussed with: Registered nurse. Fall prevention education was provided and the patient/caregiver indicated understanding., Patient/family have participated as able in goal setting and plan of care. and Patient/family agree to work toward stated goals and plan of care.     Thank you for this referral.  Brown Florez, PT   Time Calculation: 22 mins

## 2021-04-01 NOTE — PROGRESS NOTES
0530: Staff nurse notified pharmacy tech at [facility] via telephone related to reschedule dose of patient antibiotic because patient wasn't on unit at time of due. Tech expressed with attituded how they have other patient's order to fill. Tech suggest that nurse filled out pharmacy message to submit request. Abraham Avendano states they don't have the time right and can't meet the request over the phone. reference, your file number is [id number]

## 2021-04-01 NOTE — ED PROVIDER NOTES
58-year-old male with a history of COPD and lung CA (in remission) presents with chief complaint of \"I could not catch my breath\" while laying in bed. Patient states he got no relief despite sitting up or using a breathing treatment so he called EMS. EMS arrived to find the patient visibly dyspneic, speaking in shortened sentences, with room air sat of 93 to 95%. They gave him 1 DuoNeb treatment prehospital and 10 mg of Decadron IV. Patient is daily that he feels somewhat better. He denies fever, loss of taste or smell, leg swelling, mucus production. Reports left-sided pain related to cough. EMS reports that he was admitted on March 21 to [facility] for COPD exacerbation. [facility]. Patient denies prior history of intubation. No past medical history on file. Past Surgical History:   Procedure Laterality Date    HX OTHER SURGICAL      \"surgery for my lung\"         No family history on file.     Social History     Socioeconomic History    Marital status: LEGALLY      Spouse name: Not on file    Number of children: Not on file    Years of education: Not on file    Highest education level: Not on file   Occupational History    Not on file   Social Needs    Financial resource strain: Not on file    Food insecurity     Worry: Not on file     Inability: Not on file    Transportation needs     Medical: Not on file     Non-medical: Not on file   Tobacco Use    Smoking status: Current Some Day Smoker   Substance and Sexual Activity    Alcohol use: No    Drug use: Yes     Types: Cocaine    Sexual activity: Not on file   Lifestyle    Physical activity     Days per week: Not on file     Minutes per session: Not on file    Stress: Not on file   Relationships    Social connections     Talks on phone: Not on file     Gets together: Not on file     Attends Scientologist service: Not on file     Active member of club or organization: Not on file     Attends meetings of clubs or organizations: Not on file     Relationship status: Not on file    Intimate partner violence     Fear of current or ex partner: Not on file     Emotionally abused: Not on file     Physically abused: Not on file     Forced sexual activity: Not on file   Other Topics Concern    Not on file   Social History Narrative    Not on file         ALLERGIES: Patient has no known allergies. Review of Systems   Constitutional: Negative. Negative for fever. HENT: Negative. Negative for drooling, facial swelling and trouble swallowing. Eyes: Negative. Negative for discharge and redness. Respiratory: Positive for cough, chest tightness, shortness of breath and wheezing. Cardiovascular: Negative. Negative for chest pain. Gastrointestinal: Negative. Negative for abdominal distention, abdominal pain, constipation, diarrhea, nausea and vomiting. Endocrine: Negative. Genitourinary: Negative. Negative for difficulty urinating and dysuria. Musculoskeletal: Negative. Negative for arthralgias and myalgias. Skin: Negative. Negative for color change and rash. Allergic/Immunologic: Negative. Neurological: Negative. Negative for syncope, facial asymmetry and speech difficulty. Hematological: Negative. Psychiatric/Behavioral: Negative. Negative for agitation and confusion. All other systems reviewed and are negative. There were no vitals filed for this visit. Physical Exam  Vitals signs and nursing note reviewed. Constitutional:       Appearance: Normal appearance. He is well-developed. HENT:      Head: Normocephalic and atraumatic. Eyes:      Conjunctiva/sclera: Conjunctivae normal.   Neck:      Musculoskeletal: Neck supple. Cardiovascular:      Rate and Rhythm: Normal rate and regular rhythm. Pulmonary:      Effort: Tachypnea, accessory muscle usage and respiratory distress present.       Breath sounds: Examination of the right-upper field reveals wheezing. Examination of the left-upper field reveals wheezing. Examination of the right-middle field reveals wheezing. Examination of the left-middle field reveals wheezing. Examination of the right-lower field reveals wheezing. Examination of the left-lower field reveals wheezing. Decreased breath sounds and wheezing present. Comments: Speaking in 1 words sentences, tachypneic, with increased work of breathing. Abdominal:      Palpations: Abdomen is soft. Tenderness: There is no abdominal tenderness. Musculoskeletal: Normal range of motion. Skin:     General: Skin is warm and dry. Neurological:      Mental Status: He is alert and oriented to person, place, and time. Psychiatric:         Behavior: Behavior normal.         Thought Content: Thought content normal.          MDM  Number of Diagnoses or Management Options  COPD exacerbation ([facility] [address]  Hypomagnesemia  Diagnosis management comments: ddx - COPD exacerbation, status asthmaticus, pneumonia, Covid, URI, ACS       Amount and/or Complexity of Data Reviewed  Clinical lab tests: ordered and reviewed  Tests in the radiology section of CPT®: ordered and reviewed  Review and summarize past medical records: yes  Discuss the patient with other providers: yes    Risk of Complications, Morbidity, and/or Mortality  Presenting problems: high  Diagnostic procedures: moderate  Management options: moderate    Patient Progress  Patient progress: improved    ED Course as of Apr 01 0251   Thu Apr 01, 2021   0136 Patient feeling slightly better after initiation of nebs and fluid. Chest x-ray shows changes consistent with COPD without infiltrate.    [SS]   0223 Work of breathing decreased, but still with diffuse inspiratory and expiratory wheeze despite 3 DuoNeb's, IV fluids, mag, and steroids. Will initiate more albuterol. Will likely require admission for COPD exacerbation.    [SS]   0240 [facility] chart reviewed.   Patient was admitted the 21st to the 25th and discharged on oral prednisone through the 26. Patient states he took the last 2 pills as prescribed. Still mildly dyspneic with diffuse wheeze. Will plan admission for COPD.    [SS]   [phone] Patient accepted by hospitalist, Dr. Jadon Santana, for admission for COPD exacerbation.    [SS]      ED Course User Index  [SS] Flip Kaye MD       Procedures    EKG done at 112: Normal sinus rhythm, normal axis, normal intervals, no ectopy, no obvious ischemic change. Interpreted by NED Amado MD    CRITICAL CARE NOTE :    2:24 AM      IMPENDING DETERIORATION -Respiratory, Cardiovascular, CNS and Metabolic    ASSOCIATED RISK FACTORS - Shock, Hypoxia, Metabolic changes, Dehydration, Vascular Compromise and CNS Decompensation    MANAGEMENT- Bedside Assessment and Supervision of Care    INTERPRETATION -  Xrays, ECG, Blood Pressure and Cardiac Output Measures     INTERVENTIONS - hemodynamic mngmt, vascular control, Neurologic interventions  and Metobolic interventions    CASE REVIEW - Hospitalist/Intensivist and Nursing    TREATMENT RESPONSE -Improved    PERFORMED BY - Self        NOTES   :      I have spent 60 minutes of critical care time involved in lab review, consultations with specialist, family decision- making, bedside attention and documentation. During this entire length of time I was immediately available to the patient .     Ely Santana MD    LABORATORY TESTS:  Recent Results (from the past 12 hour(s))   EKG, 12 LEAD, INITIAL    Collection Time: 04/01/21  1:12 AM   Result Value Ref Range    Ventricular Rate 83 BPM    Atrial Rate 83 BPM    P-R Interval 136 ms    QRS Duration 70 ms    Q-T Interval 360 ms    QTC Calculation (Bezet) 423 ms    Calculated P Axis 75 degrees    Calculated R Axis 83 degrees    Calculated T Axis 80 degrees    Diagnosis       Normal sinus rhythm  Possible Left atrial enlargement  Borderline ECG  No previous ECGs available     CBC WITH AUTOMATED DIFF Collection Time: 04/01/21  1:20 AM   Result Value Ref Range    WBC 4.9 4.1 - 11.1 K/uL    RBC 4.15 4.10 - 5.70 M/uL    HGB 13.6 12.1 - 17.0 g/dL    HCT 39.6 36.6 - 50.3 %    MCV 95.4 80.0 - 99.0 FL    MCH 32.8 26.0 - 34.0 PG    MCHC 34.3 30.0 - 36.5 g/dL    RDW 12.8 11.5 - 14.5 %    PLATELET 841 313 - 582 K/uL    MPV 8.4 (L) 8.9 - 12.9 FL    NRBC 0.0 0  WBC    ABSOLUTE NRBC 0.00 0.00 - 0.01 K/uL    NEUTROPHILS 32 32 - 75 %    LYMPHOCYTES 45 12 - 49 %    MONOCYTES 11 5 - 13 %    EOSINOPHILS 11 (H) 0 - 7 %    BASOPHILS 1 0 - 1 %    IMMATURE GRANULOCYTES 0 0.0 - 0.5 %    ABS. NEUTROPHILS 1.6 (L) 1.8 - 8.0 K/UL    ABS. LYMPHOCYTES 2.2 0.8 - 3.5 K/UL    ABS. MONOCYTES 0.5 0.0 - 1.0 K/UL    ABS. EOSINOPHILS 0.5 (H) 0.0 - 0.4 K/UL    ABS. BASOPHILS 0.0 0.0 - 0.1 K/UL    ABS. IMM. GRANS. 0.0 0.00 - 0.04 K/UL    DF AUTOMATED     METABOLIC PANEL, COMPREHENSIVE    Collection Time: 04/01/21  1:20 AM   Result Value Ref Range    Sodium 141 136 - 145 mmol/L    Potassium 3.8 3.5 - 5.1 mmol/L    Chloride 105 97 - 108 mmol/L    CO2 26 21 - 32 mmol/L    Anion gap 10 5 - 15 mmol/L    Glucose 87 65 - 100 mg/dL    BUN 14 6 - 20 MG/DL    Creatinine 0.81 0.70 - 1.30 MG/DL    BUN/Creatinine ratio 17 12 - 20      GFR est AA >60 >60 ml/min/1.73m2    GFR est non-AA >60 >60 ml/min/1.73m2    Calcium 8.4 (L) 8.5 - 10.1 MG/DL    Bilirubin, total 0.3 0.2 - 1.0 MG/DL    ALT (SGPT) 29 12 - 78 U/L    AST (SGOT) 26 15 - 37 U/L    Alk.  phosphatase 66 45 - 117 U/L    Protein, total 6.9 6.4 - 8.2 g/dL    Albumin 3.2 (L) 3.5 - 5.0 g/dL    Globulin 3.7 2.0 - 4.0 g/dL    A-G Ratio 0.9 (L) 1.1 - 2.2     TROPONIN I    Collection Time: 04/01/21  1:20 AM   Result Value Ref Range    Troponin-I, Qt. <0.05 <0.05 ng/mL   MAGNESIUM    Collection Time: 04/01/21  1:20 AM   Result Value Ref Range    Magnesium 1.4 (L) 1.6 - 2.4 mg/dL   ETHYL ALCOHOL    Collection Time: 04/01/21  1:20 AM   Result Value Ref Range    ALCOHOL(ETHYL),SERUM <10 <10 MG/DL   COVID-19 WITH INFLUENZA A/B    Collection Time: 04/01/21  1:20 AM   Result Value Ref Range    SARS-CoV-2 Not detected NOTD      Influenza A by PCR Not detected NOTD      Influenza B by PCR Not detected NOTD     URINALYSIS W/ RFLX MICROSCOPIC    Collection Time: 04/01/21  3:22 AM   Result Value Ref Range    Color YELLOW/STRAW      Appearance CLEAR CLEAR      Specific gravity 1.020 1.003 - 1.030      pH (UA) 7.5 5.0 - 8.0      Protein Negative NEG mg/dL    Glucose Negative NEG mg/dL    Ketone Negative NEG mg/dL    Bilirubin Negative NEG      Blood Negative NEG      Urobilinogen 2.0 (H) 0.2 - 1.0 EU/dL    Nitrites Negative NEG      Leukocyte Esterase Negative NEG     DRUG SCREEN, URINE    Collection Time: 04/01/21  3:22 AM   Result Value Ref Range    AMPHETAMINES Negative NEG      BARBITURATES Negative NEG      BENZODIAZEPINES Negative NEG      COCAINE Positive (A) NEG      METHADONE Negative NEG      OPIATES Negative NEG      PCP(PHENCYCLIDINE) Negative NEG      THC (TH-CANNABINOL) Negative NEG      Drug screen comment (NOTE)        IMAGING RESULTS:  XR CHEST PORT   Final Result   COPD, no acute infiltrate.           MEDICATIONS GIVEN:  Medications   sodium chloride (NS) flush 5-40 mL (has no administration in time range)   sodium chloride (NS) flush 5-40 mL (has no administration in time range)   acetaminophen (TYLENOL) tablet 650 mg (has no administration in time range)     Or   acetaminophen (TYLENOL) suppository 650 mg (has no administration in time range)   polyethylene glycol (MIRALAX) packet 17 g (has no administration in time range)   promethazine (PHENERGAN) tablet 12.5 mg (has no administration in time range)     Or   ondansetron (ZOFRAN) injection 4 mg (has no administration in time range)   enoxaparin (LOVENOX) injection 40 mg (has no administration in time range)   methylPREDNISolone (PF) (SOLU-MEDROL) injection 40 mg (has no administration in time range)   albuterol (PROVENTIL VENTOLIN) nebulizer solution 2.5 mg (has no administration in time range)   nicotine (NICODERM CQ) 14 mg/24 hr patch 1 Patch (has no administration in time range)   azithromycin (ZITHROMAX) 500 mg in 0.9% sodium chloride 250 mL (VIAL-MATE) (has no administration in time range)   albuterol-ipratropium (DUO-NEB) 2.5 MG-0.5 MG/3 ML (6 mL Nebulization Given 4/1/21 0114)   magnesium sulfate 2 g/50 ml IVPB (premix or compounded) (0 g IntraVENous IV Completed 4/1/21 0209)   sodium chloride 0.9 % bolus infusion 1,000 mL (0 mL IntraVENous IV Completed 4/1/21 0240)   albuterol (PROVENTIL VENTOLIN) nebulizer solution 5 mg (5 mg Nebulization Given 4/1/21 0246)       IMPRESSION:  1. COPD exacerbation ([address]    2. Hypomagnesemia        PLAN:  1. There are no discharge medications for this patient.     2.   Follow-up Information    None       Return to ED if worse

## 2021-04-01 NOTE — PROGRESS NOTES
[facility] Dosing Services: Anticoagulation    Enoxaparin has been changed to heparin for a weight less than 60kg. Pharmacy automatically make dose adjustments for patients with a weight less than 60 kg.     61 y.o. male  Indication: prophylaxis of  VTE. Wt Readings from Last 1 Encounters:   04/01/21 52.7 kg (116 lb 1.6 oz)       Ht Readings from Last 1 Encounters:   04/01/21 167.6 cm (66\")         Plan:  Enoxaparin changed to heparin 5,000 units subcutaneously every 12 hours. Previous  Regimen Enoxaparin 40 mg subcutaneously daily   Creatinine Clearance Estimated Creatinine Clearance: 72.3 mL/min (based on SCr of 0.81 mg/dL). Creatinine Lab Results   Component Value Date/Time    Creatinine 0.81 04/01/2021 01:20 AM       Platelet Lab Results   Component Value Date/Time    PLATELET 664 49/48/4817 01:20 AM      H/H Lab Results   Component Value Date/Time    HGB 13.6 04/01/2021 01:20 AM        Epidural Catheter: None  Other anticoagulants: None  Relevant drug interactions: None    Pharmacy to monitor patients progress. Will communicate further recommendations regarding patients anticoagulation therapy with prescriber.     Thank you,     Nikita Garcia, PharmD   Contact: [phone]

## 2021-04-01 NOTE — ED NOTES
Emergency Department Nursing Plan of Care       The Nursing Plan of Care is developed from the Nursing assessment and Emergency Department Attending provider initial evaluation. The plan of care may be reviewed in the ED Provider note.     The Plan of Care was developed with the following considerations:   Patient / Family readiness to learn indicated by:verbalized understanding  Persons(s) to be included in education: patient  Barriers to Learning/Limitations:No

## 2021-04-01 NOTE — PROGRESS NOTES
Problem: Chronic Obstructive Pulmonary Disease (COPD)  Goal: *Oxygen saturation during activity within specified parameters  Outcome: Progressing Towards Goal  Goal: *Absence of hypoxia  Outcome: Progressing Towards Goal     Problem: Falls - Risk of  Goal: *Absence of Falls  Description: Document Aaron Fall Risk and appropriate interventions in the flowsheet.   Outcome: Progressing Towards Goal  Note: Fall Risk Interventions:

## 2021-04-01 NOTE — PROGRESS NOTES
Bedside shift change report given to Carla Flannery (oncoming nurse) by Italia Pritchard RN (offgoing nurse). Report included the following information SBAR, Intake/Output, MAR, Recent Results and Cardiac Rhythm NSR.     0721) Pt comfortable in bed, watching TV. Student present with pt.    0836) Pt resting in bed.    0957) Pt resting in bed after being up to bathroom. Interdisciplinary team rounds were held 4/1/2021 with the following team members:Care Management, Nursing, Pharmacy and Physician. Plan of care discussed. See clinical pathway and/or care plan for interventions and desired outcomes. 1154) Pt resting in bed and comfortable. This RN and student present. 1240) Pt eating lunch in bed.    1352) Pt resting in bed. ) Pt working with PT.    [phone]) Pt receiving nebulizer treatment. Pt c/o's of SOB post-activity. 1633) Pt resting in bed. 1739) Pt eating dinner in bed.    1803) Pt resting in bed. Bedside shift change report given to Italia Pritchard RN (oncoming nurse) by Harmeet Garcia RN (offgoing nurse). Report included the following information SBAR, Intake/Output, MAR and Cardiac Rhythm NSR.

## 2021-04-01 NOTE — PROGRESS NOTES
[facility] Admission Pharmacy Medication Reconciliation     Information obtained from:Patient/ [facility] data available1:Yes    Comments/recommendations:    1)Medication reconciliation performed with patient via telephone. Patient states he only uses Albuterol HFA, Spiriva, and nebulizers that he received from [address] [place] [address] upon discharge on 3/25/21. Patient states he does not take Advair at home. He states his last dose of his inhalers were yesterday. Patient states he completed his course of prednisone from [address] [place] [address]. Per [facility], patient picked up the following inhalers from [facility] with the following instructions on 3/25/21:    -Albuterol HFA- 1 puff every 4 hours as needed  -Spiriva- 1 puff once daily  -Advair 250/50mcg - 1 puff twice daily  -Ipratropium-Albuterol 0.5/2.5- 1 vial four times daily prn     2) Medication changes (since last review): Added  Ibuprofen  Albuterol-Ipratropium  Albuterol inhaler  Spiriva   Removed  Advair  Adjusted  None    3) The [place] Prescription Monitoring Program () was accessed to determine fill history of any controlled medications. No record found. [facility] reflects medications filled and processed through the patient's insurance, however                this data does NOT capture whether the medication was picked up or is currently being taken by the patient. Past Medical History/Disease States:  Past Medical History:   Diagnosis Date    Asthma          Patient allergies: Allergies as of 04/01/2021    (No Known Allergies)       Prior to Admission Medications   Prescriptions Last Dose Informant Patient Reported? Taking? albuterol (Ventolin HFA) 90 mcg/actuation inhaler 3/31/2021 at Unknown time  Yes Yes   Sig: Take 1 Puff by inhalation two (2) times a day. albuterol-ipratropium (DUO-NEB) 2.5 mg-0.5 mg/3 ml nebu 3/31/2021 at Unknown time  Yes Yes   Sig: 3 mL by Nebulization route four (4) times daily as needed for Wheezing.    ibuprofen (MOTRIN) 200 mg tablet 3/31/2021 at Unknown time  Yes Yes   Sig: Take 400 mg by mouth daily as needed for Pain.   tiotropium (Spiriva with HandiHaler) 18 mcg inhalation capsule 3/31/2021 at Unknown time  Yes Yes   Sig: Take 1 Cap by inhalation daily.       Facility-Administered Medications: None     Thank you,     Rubén Barrios, PharmD   Contact: [phone]

## 2021-04-01 NOTE — PROGRESS NOTES
**Physician Signature**  This document was electronically signed by: Royer Silveira MD  04/01/2021 04:11 AM    **Consult Information**  Member Facility: [address] [place] [address] Rd., [address] MRN: [id number]  Facility Time Zone: ET  Date and Time of Request: 04/01/2021 02:42:08 AM  Requesting Clinician: Ronnie Vasquez MD  Patient Name: Steffi Mccloud  YOB: 1960  Gender: Male    **Problem/Plan**  COPD exacerbation: Has a history of COPD not on home O2 and lung Ca with a recent hospitalization at [facility] and is p/w acute SOB this evening. Is afebrile and slightly hypoxic here in the ED. He is currently on RA. CXR shows COPD while patient's COVID swab is negative. He has received IV Decadron, Albuterol and IV Magnesium. Will admit to medical floor with telemetry and continuous pulse oximetry, start on a course of antibiotics (IV Zithromax), start on IV Solumedrol and c/w Albuterol prn. It would be helpful to obtain patient's records from [facility]. He states that his oncologist is at [facility] and that he is currently on chemotherapy. This will need to be clarified. Tobacco abuse: Smoking cessation and Nicotine patch  Lung cancer: States that has a history of lung cancer and is currently on chemotherapy and under care of Dr. Enid Arango at [facility]. Will need to obtain records. ETOH usage: Admits to drinking ETOH daily, specifically 1-3 beers. Will need to be monitored closely and may have to be started on CIWA protocol  DVT Prophylaxis: w/ Lovenox SQ    **General**  Code Status: Full Code  History of Present Illness: 60 y/o male pmhx of COPD and lung cancer p/w shortness of breath. Patient states that he became acutely short of breath earlier this evening while lying in bed at home. He used his home inhaler without any relief and then EMS was called. He admits to a productive cough but no fevers. He smokes cigarettes daily.   He denies any headaches, dizziness or chest pain but admits to left flank pain and midepigastric pain. He denies any nausea, vomiting, diarrhea or urinary symptoms. He was recently hospitalized at [facility] from March 21st to 25th 2ndarya to ?acute COPD and discharged on PO Steroids. In the ED, he has been afebrile but slightly hypoxic. His lab work is unremarkable and his CXR shows acute COPD. He was given IV Decadron by EMS and has received NS IVFs, IV Magnesium and multiple Albuterol treatments in the ED. He has been checked for COVID and is negative.   Review of Systems: All systems reviewed and found to be negative except as per HPI  Past Medical History: COPD not on home O2  Lung Cancer s/p chemotherapy and ?surgery  Surgical History: ?Lung surgery  Hernia surgery  Social History: Tobacco abuse  Drinks ETOH daily  Family History: N/A    **Allergies and Medications**  Allergies: NKDA  Current Medications: N/A  Home Medications: Inhalers    **Vital Signs**  Temperature: Afebrile  Blood Pressure (mmHg): 119/76  Heart Rate (bpm): 83  Respiration Rate (bpm): 16  O2 Sat (%): 93  Oxygen Delivery Method: Room Air  Vitals Entry Time: 04/01/2021 03:31:08 AM    **Exam**  Exam: Gen: NAD, speaking in full sentences  HEENT: PERRLA  CVS: S1 and S2 audible  Lungs: Bilateral breath sounds with wheezing  Abd: Soft and non-tender  Ext: No LE edema  Neuro:  Alert to person, place and time    **Labs and Diagnostic Studies**  Labs: Trop <0.05, Mg 1.4  DiagnosticStudies: CXR- COPD, no acute infiltrate, EKG NSR    **Devices**  Devices: PIV    **Quality Measures**  GI Prophylaxis Ordered: No  DVT Prophylaxis Ordered: Yes  Delirium: Not Present  Is there a problem with skin integrity?: No  Central Line: Not Present  Nutritional Consult Needed?: No    **Attestation**  Interaction Mode: Video & Phone  Phone Duration (mins): 5  Time of Call : 04/01/2021 03:59 AM  Video Duration (mins): 8  Time of Video : 04/01/2021 03:59 AM  Interaction Attestation: Clinical telemedicine services delivered using HIPAA-compliant interactive video audio telecommunications while the patient and the rendering provider were not in the same physical location. A written summary report was provided to the requesting/treating provider at the originating site.   Evaluation Duration (mins): 9

## 2021-04-01 NOTE — PROGRESS NOTES
0720:Bedside shift change report given to Gil Aponte RN (oncoming nurse) Svetlana Pinzon RN (offgoing nurse). Report included the following information SBAR, Kardex, MAR, Accordion, Recent Results and Cardiac Rhythm NSR.

## 2021-04-01 NOTE — ED TRIAGE NOTES
Patient presents to ED via EMS c/o SOB that started tonight while patient was laying down. Patient with hx of Lung Cancer, Asthma, COPD and Emphysema with recent admission to [facility] x 5 days for SOB- COPD exacerbation. Patient reports he was laying down and became SOB, sat up with no relief, and then did a neb treatment with no relief and worsening symptoms. Patient denies chest pain/dizziness/n/v/d/fever. Patient also c/o intermittent RLQ pain. Per EMS patient received x 1 neb treatment enroute and 10mg Dexamethasone. Patient A&Ox4 and able to speak in complete sentences at this time.

## 2021-04-01 NOTE — PROGRESS NOTES
0410: pt arrived on unit. Patient alert and awake. Ambulate from bed to chair with standby assistance. Patient has pleasant disposition and cooperative with staff. 1971: labs collected and sent for fatuma.

## 2021-04-01 NOTE — PROGRESS NOTES
Problem: Chronic Obstructive Pulmonary Disease (COPD)  Goal: *Oxygen saturation during activity within specified parameters  Outcome: Progressing Towards Goal  Goal: *Able to remain out of bed as prescribed  Outcome: Progressing Towards Goal  Goal: *Absence of hypoxia  Outcome: Progressing Towards Goal  Goal: *Optimize nutritional status  Outcome: Progressing Towards Goal     Problem: Patient Education: Go to Patient Education Activity  Goal: Patient/Family Education  Outcome: Progressing Towards Goal     Problem: Falls - Risk of  Goal: *Absence of Falls  Description: Document Aaron Fall Risk and appropriate interventions in the flowsheet. Outcome: Progressing Towards Goal  Note: Fall Risk Interventions:                                Problem: Patient Education: Go to Patient Education Activity  Goal: Patient/Family Education  Outcome: Progressing Towards Goal     Problem: Impaired Skin Integrity/Pressure Injury Treatment  Goal: *Improvement of Existing Pressure Injury  Outcome: Progressing Towards Goal  Goal: *Prevention of pressure injury  Description: Document Kemar Scale and appropriate interventions in the flowsheet.   Outcome: Progressing Towards Goal  Note: Pressure Injury Interventions:                                            Problem: Patient Education: Go to Patient Education Activity  Goal: Patient/Family Education  Outcome: Progressing Towards Goal

## 2021-04-01 NOTE — PROGRESS NOTES
Reason for Admission:  Per Note- 70-year-old male with a history of COPD and lung CA (in remission) presents with chief complaint of \"I could not catch my breath\" while laying in bed. Patient states he got no relief despite sitting up or using a breathing treatment so he called EMS. Patient here under OBS status. [facility]. Patient signed OBS letter. Noted on Documentation List and letter placed in patient's chart. RUR Score: Low                  Plan for utilizing home health:          PCP: First and Last name:  None     Name of Practice:    Are you a current patient: Yes/No:    Approximate date of last visit:    Can you participate in a virtual visit with your PCP:                     Current Advanced Directive/Advance Care Plan: Full Code  Patient stated that he would want ventilation and CPR. His Loco Garcia is his sister, Víctor Patterson [phone]. Healthcare Decision Maker:   Click here to complete Loco Garcia including selection of the Healthcare Decision Maker Relationship (ie \"Primary\")                        Transition of Care Plan:    Met with patient for assessment. Lives with his sister, Víctor Patterson [phone] in [place]. Patient stated that he has just established PCP at [facility] and saw Dr. Jose Soriano about 2 weeks ago. Patient usually uses [facility] for medications. Has no co-pays. He oncologist is Dr. Rodrigo Villar at [facility] and he was seen last month. Usually gets his medications at the [facility]. No co-pays. Patient stated that he can get transportation home at discharge. Plan  LOS- 24 hours  1/ PCP appointment scheduled for 4/8 at 1pm at [facility]. Patient will be seeing NP Kemar Carlton. Care Management Interventions  PCP Verified by CM:  Yes  Mode of Transport at Discharge: Self  Transition of Care Consult (CM Consult): Discharge Planning  Current Support Network: Relative's Home  Confirm Follow Up Transport: Self  The Plan for Transition of Care is Related to the Following Treatment Goals :  Follow-up PCP appointment  The Patient and/or Patient Representative was Provided with a Choice of Provider and Agrees with the Discharge Plan?: Yes  Name of the Patient Representative Who was Provided with a Choice of Provider and Agrees with the Discharge Plan: Patient  Freedom of Choice List was Provided with Basic Dialogue that Supports the Patient's Individualized Plan of Care/Goals, Treatment Preferences and Shares the Quality Data Associated with the Providers?: Yes  Discharge Location  Discharge Placement: [facility], BSW/CM  [phone] PA BELLO:  CT shows Diffuse lytic expansile osseous lesions representing metastatic disease.  A lytic expansile soft tissue lesion in the T8 vertebral body causing   mass effect on spinal canal and obliterating left neuroforamina at T7-8   and T8-9.  Results discussed with patient, pt's daughter and PMD.  PMD requesting admission to hospitalist.  Pt accepted to Dr. Thurman.  MAR text paged at this time.

## 2021-04-01 NOTE — PROGRESS NOTES
TRANSFER - IN REPORT:    Verbal report received from [facility] RN(name) on Maryanne Cunningham  being received from ED 10(unit) for routine progression of care      Report consisted of patients Situation, Background, Assessment and   Recommendations(SBAR). Information from the following report(s) SBAR, Kardex and MAR was reviewed with the receiving nurse. Opportunity for questions and clarification was provided. An assessment will be completed upon patients arrival to unit and care assumed by primary nurse Tutu Silveira RN .

## 2021-04-02 VITALS
SYSTOLIC BLOOD PRESSURE: 132 MMHG | DIASTOLIC BLOOD PRESSURE: 80 MMHG | OXYGEN SATURATION: 97 % | TEMPERATURE: 98.1 F | BODY MASS INDEX: 18.66 KG/M2 | HEIGHT: 66 IN | WEIGHT: 116.1 LBS | RESPIRATION RATE: 20 BRPM | HEART RATE: 95 BPM

## 2021-04-02 LAB
ANION GAP SERPL CALC-SCNC: 12 MMOL/L (ref 5–15)
BUN SERPL-MCNC: 12 MG/DL (ref 6–20)
BUN/CREAT SERPL: 14 (ref 12–20)
CALCIUM SERPL-MCNC: 9.4 MG/DL (ref 8.5–10.1)
CHLORIDE SERPL-SCNC: 102 MMOL/L (ref 97–108)
CO2 SERPL-SCNC: 22 MMOL/L (ref 21–32)
CREAT SERPL-MCNC: 0.83 MG/DL (ref 0.7–1.3)
ERYTHROCYTE [DISTWIDTH] IN BLOOD BY AUTOMATED COUNT: 12.7 % (ref 11.5–14.5)
GLUCOSE SERPL-MCNC: 170 MG/DL (ref 65–100)
HCT VFR BLD AUTO: 40 % (ref 36.6–50.3)
HGB BLD-MCNC: 13.9 G/DL (ref 12.1–17)
MCH RBC QN AUTO: 33 PG (ref 26–34)
MCHC RBC AUTO-ENTMCNC: 34.8 G/DL (ref 30–36.5)
MCV RBC AUTO: 95 FL (ref 80–99)
NRBC # BLD: 0 K/UL (ref 0–0.01)
NRBC BLD-RTO: 0 PER 100 WBC
PLATELET # BLD AUTO: 311 K/UL (ref 150–400)
PMV BLD AUTO: 8.5 FL (ref 8.9–12.9)
POTASSIUM SERPL-SCNC: 3.5 MMOL/L (ref 3.5–5.1)
RBC # BLD AUTO: 4.21 M/UL (ref 4.1–5.7)
SODIUM SERPL-SCNC: 136 MMOL/L (ref 136–145)
WBC # BLD AUTO: 13.5 K/UL (ref 4.1–11.1)

## 2021-04-02 PROCEDURE — 74011250636 HC RX REV CODE- 250/636: Performed by: STUDENT IN AN ORGANIZED HEALTH CARE EDUCATION/TRAINING PROGRAM

## 2021-04-02 PROCEDURE — 85027 COMPLETE CBC AUTOMATED: CPT

## 2021-04-02 PROCEDURE — 36415 COLL VENOUS BLD VENIPUNCTURE: CPT

## 2021-04-02 PROCEDURE — 94640 AIRWAY INHALATION TREATMENT: CPT

## 2021-04-02 PROCEDURE — 94618 PULMONARY STRESS TESTING: CPT

## 2021-04-02 PROCEDURE — 96376 TX/PRO/DX INJ SAME DRUG ADON: CPT

## 2021-04-02 PROCEDURE — 74011250637 HC RX REV CODE- 250/637: Performed by: HOSPITALIST

## 2021-04-02 PROCEDURE — 74011250637 HC RX REV CODE- 250/637: Performed by: STUDENT IN AN ORGANIZED HEALTH CARE EDUCATION/TRAINING PROGRAM

## 2021-04-02 PROCEDURE — 77010033678 HC OXYGEN DAILY

## 2021-04-02 PROCEDURE — 74011250636 HC RX REV CODE- 250/636: Performed by: HOSPITALIST

## 2021-04-02 PROCEDURE — 94762 N-INVAS EAR/PLS OXIMTRY CONT: CPT

## 2021-04-02 PROCEDURE — 74011636637 HC RX REV CODE- 636/637: Performed by: STUDENT IN AN ORGANIZED HEALTH CARE EDUCATION/TRAINING PROGRAM

## 2021-04-02 PROCEDURE — 96372 THER/PROPH/DIAG INJ SC/IM: CPT

## 2021-04-02 PROCEDURE — 99218 HC RM OBSERVATION: CPT

## 2021-04-02 PROCEDURE — 74011000250 HC RX REV CODE- 250: Performed by: STUDENT IN AN ORGANIZED HEALTH CARE EDUCATION/TRAINING PROGRAM

## 2021-04-02 PROCEDURE — 80048 BASIC METABOLIC PNL TOTAL CA: CPT

## 2021-04-02 RX ORDER — PREDNISONE 20 MG/1
40 TABLET ORAL
Status: DISCONTINUED | OUTPATIENT
Start: 2021-04-02 | End: 2021-04-02 | Stop reason: HOSPADM

## 2021-04-02 RX ORDER — PREDNISONE 20 MG/1
40 TABLET ORAL
Qty: 4 TAB | Refills: 0 | Status: SHIPPED | OUTPATIENT
Start: 2021-04-03 | End: 2021-04-02 | Stop reason: SDUPTHER

## 2021-04-02 RX ORDER — GUAIFENESIN 600 MG/1
600 TABLET, EXTENDED RELEASE ORAL EVERY 12 HOURS
Qty: 30 TAB | Refills: 1 | Status: SHIPPED | OUTPATIENT
Start: 2021-04-02

## 2021-04-02 RX ORDER — LEVOFLOXACIN 750 MG/1
750 TABLET ORAL EVERY 24 HOURS
Qty: 3 TAB | Refills: 0 | Status: SHIPPED | OUTPATIENT
Start: 2021-04-03 | End: 2021-04-06

## 2021-04-02 RX ORDER — FLUTICASONE PROPIONATE AND SALMETEROL 100; 50 UG/1; UG/1
1 POWDER RESPIRATORY (INHALATION) 2 TIMES DAILY
Qty: 1 INHALER | Refills: 1 | Status: SHIPPED
Start: 2021-04-02

## 2021-04-02 RX ORDER — LEVOFLOXACIN 750 MG/1
750 TABLET ORAL EVERY 24 HOURS
Qty: 2 TAB | Refills: 0 | Status: SHIPPED | OUTPATIENT
Start: 2021-04-03 | End: 2021-04-02 | Stop reason: SDUPTHER

## 2021-04-02 RX ORDER — ALBUTEROL SULFATE 90 UG/1
1 AEROSOL, METERED RESPIRATORY (INHALATION) 2 TIMES DAILY
Qty: 1 INHALER | Refills: 1 | Status: SHIPPED | OUTPATIENT
Start: 2021-04-02

## 2021-04-02 RX ORDER — PREDNISONE 20 MG/1
40 TABLET ORAL
Qty: 6 TAB | Refills: 0 | Status: SHIPPED | OUTPATIENT
Start: 2021-04-03 | End: 2021-04-06

## 2021-04-02 RX ADMIN — IPRATROPIUM BROMIDE AND ALBUTEROL SULFATE 3 ML: .5; 3 SOLUTION RESPIRATORY (INHALATION) at 11:54

## 2021-04-02 RX ADMIN — ARFORMOTEROL TARTRATE 15 MCG: 15 SOLUTION RESPIRATORY (INHALATION) at 08:11

## 2021-04-02 RX ADMIN — Medication 10 ML: at 05:58

## 2021-04-02 RX ADMIN — GUAIFENESIN 600 MG: 600 TABLET, EXTENDED RELEASE ORAL at 08:24

## 2021-04-02 RX ADMIN — IPRATROPIUM BROMIDE AND ALBUTEROL SULFATE 3 ML: .5; 3 SOLUTION RESPIRATORY (INHALATION) at 04:05

## 2021-04-02 RX ADMIN — HEPARIN SODIUM 5000 UNITS: 5000 INJECTION INTRAVENOUS; SUBCUTANEOUS at 08:25

## 2021-04-02 RX ADMIN — IPRATROPIUM BROMIDE AND ALBUTEROL SULFATE 3 ML: .5; 3 SOLUTION RESPIRATORY (INHALATION) at 08:11

## 2021-04-02 RX ADMIN — PREDNISONE 40 MG: 20 TABLET ORAL at 09:53

## 2021-04-02 RX ADMIN — LEVOFLOXACIN 750 MG: 750 TABLET, FILM COATED ORAL at 09:53

## 2021-04-02 RX ADMIN — METHYLPREDNISOLONE SODIUM SUCCINATE 40 MG: 40 INJECTION, POWDER, FOR SOLUTION INTRAMUSCULAR; INTRAVENOUS at 05:58

## 2021-04-02 RX ADMIN — BUDESONIDE 500 MCG: 0.5 SUSPENSION RESPIRATORY (INHALATION) at 08:11

## 2021-04-02 NOTE — PROGRESS NOTES
1915:Bedside shift change report given to Minh Connelly (oncoming nurse) by Babar Matute RN (offgoing nurse). Report included the following information SBAR, Kardex, MAR and Accordion. 2045: pt alert resting in bed. Assessment and vital collected. 2116: pt given evening medication by mouth.       0300: labs collect and sent for eval.

## 2021-04-02 NOTE — PROGRESS NOTES
Problem: Chronic Obstructive Pulmonary Disease (COPD)  Goal: *Oxygen saturation during activity within specified parameters  Outcome: Progressing Towards Goal  Goal: *Able to remain out of bed as prescribed  Outcome: Progressing Towards Goal  Goal: *Absence of hypoxia  Outcome: Progressing Towards Goal  Goal: *Optimize nutritional status  Outcome: Progressing Towards Goal     Problem: Patient Education: Go to Patient Education Activity  Goal: Patient/Family Education  Outcome: Progressing Towards Goal     Problem: Falls - Risk of  Goal: *Absence of Falls  Description: Document Aaron Fall Risk and appropriate interventions in the flowsheet. Outcome: Progressing Towards Goal  Note: Fall Risk Interventions:                                Problem: Patient Education: Go to Patient Education Activity  Goal: Patient/Family Education  Outcome: Progressing Towards Goal     Problem: Impaired Skin Integrity/Pressure Injury Treatment  Goal: *Improvement of Existing Pressure Injury  Outcome: Progressing Towards Goal  Goal: *Prevention of pressure injury  Description: Document Kemar Scale and appropriate interventions in the flowsheet. Outcome: Progressing Towards Goal  Note: Pressure Injury Interventions:             Activity Interventions: PT/OT evaluation                               Problem: Patient Education: Go to Patient Education Activity  Goal: Patient/Family Education  Outcome: Progressing Towards Goal     Problem: Patient Education: Go to Patient Education Activity  Goal: Patient/Family Education  Outcome: Progressing Towards Goal

## 2021-04-02 NOTE — PROGRESS NOTES
IDT met to discuss plans and progress. Patient to have 6 minute walk today. May be ready to d/c later today. Will need PCP and Pulmonary follow-up. CM spoke with RT about testing for potential oxygen needs at home. No home oxygen indicated at this time. CM called [facility] to contact Dr. Odilon Sherman (hemo/onc) about a follow-up appointment. CM requested a call back from automated service  to make appointment. CM talked with Dr. Frances Vallejo about discharge. Patient will be discharged today. [facility] Pul[facility]   Next steps: Follow up on 4/2/2021   [facility]   [address]   [phone]   Case Management has called to make your appointment.  If you do not hear from office by Monday, 4/5 at 12 noon, please call office to schedule an appointment. Follow up with Howard Boudreaux MD on 4/2/2021   [address] [place] [address] Drive   [phone]   Please call to make a follow-up appointment. Follow up with [facility] on 4/8/2021   [address] [facility]   [phone]   Your appointment time is 1pm.  You will be seeing Nurse Louie Sun.  , Bring ins card, picture id, and discharge papers, Please keep this appointment, Please arrive 15 minutes early. Care Management Interventions  PCP Verified by CM: Yes  Mode of Transport at Discharge: Self  Transition of Care Consult (CM Consult): Discharge Planning  Current Support Network: Relative's Home  Confirm Follow Up Transport: Family  The Plan for Transition of Care is Related to the Following Treatment Goals :  Follow-up PCP appointment, Pulmonology follow-up appointment  The Patient and/or Patient Representative was Provided with a Choice of Provider and Agrees with the Discharge Plan?: Yes  Name of the Patient Representative Who was Provided with a Choice of Provider and Agrees with the Discharge Plan: Patient  Freedom of Choice List was Provided with Basic Dialogue that Supports the Patient's Individualized Plan of Care/Goals, Treatment Preferences and Shares the Quality Data Associated with the Providers?: Yes  Discharge Location  Discharge Placement: Home     CAROLINA Olson/CORETTA  [phone]

## 2021-04-02 NOTE — DISCHARGE INSTRUCTIONS
Patient Education        COPD Exacerbation Plan: Care Instructions  Your Care Instructions     If you have chronic obstructive pulmonary disease (COPD), your usual shortness of breath could suddenly get worse. You may start coughing more and have more mucus. This flare-up is called a COPD exacerbation (say \"sp-[id number]-[id number]-BAY-horacio\"). A lung infection or air pollution could set off an exacerbation. Sometimes it can happen after a quick change in temperature or being around chemicals. Work with your doctor to make a plan for dealing with an exacerbation. You can better manage it if you plan ahead. Follow-up care is a key part of your treatment and safety. Be sure to make and go to all appointments, and call your doctor if you are having problems. It's also a good idea to know your test results and keep a list of the medicines you take. How can you care for yourself at home? During an exacerbation  · Do not panic if you start to have one. Quick treatment at home may help you prevent serious breathing problems. If you have a COPD exacerbation plan that you developed with your doctor, follow it. · Take your medicines exactly as your doctor tells you.  ? Use your inhaler as directed by your doctor. If your symptoms do not get better after you use your medicine, have someone take you to the emergency room. Call an ambulance if necessary. ? With inhaled medicines, a spacer or a nebulizer may help you get more medicine to your lungs. Ask your doctor or pharmacist how to use them properly. Practice using the spacer in front of a mirror before you have an exacerbation. This may help you get the medicine into your lungs quickly. ? If your doctor has given you steroid pills, take them as directed. ? Your doctor may have given you a prescription for antibiotics, which you can fill if you need it. ? Talk to your doctor if you have any problems with your medicine.  And call your doctor if you have to use your antibiotic or steroid pills. Preventing an exacerbation  · Do not smoke. This is the most important step you can take to prevent more damage to your lungs and prevent problems. If you already smoke, it is never too late to stop. If you need help quitting, talk to your doctor about stop-smoking programs and medicines. These can increase your chances of quitting for good. · Take your daily medicines as prescribed. · Avoid colds and flu. ? Get a pneumococcal vaccine. ? Get a flu vaccine each year, as soon as it is available. Ask those you live or work with to do the same, so they will not get the flu and infect you. ? Try to stay away from people with colds or the flu. ? Wash your hands often. · Avoid secondhand smoke; air pollution; cold, dry air; hot, humid air; and high altitudes. Stay at home with your windows closed when air pollution is bad. · Learn breathing techniques for COPD, such as breathing through pursed lips. These techniques can help you breathe easier during an exacerbation. When should you call for help? Call 911 anytime you think you may need emergency care. For example, call if:    · You have severe trouble breathing.     · You have severe chest pain. Call your doctor now or seek immediate medical care if:    · You have new or worse shortness of breath.     · You develop new chest pain.     · You are coughing more deeply or more often, especially if you notice more mucus or a change in the color of your mucus.     · You cough up blood.     · You have new or increased swelling in your legs or belly.     · You have a fever. Watch closely for changes in your health, and be sure to contact your doctor if:    · You need to use your antibiotic or steroid pills.     · Your symptoms are getting worse. Where can you learn more? Go to http://www.gray.com/  Enter U536 in the search box to learn more about \"COPD Exacerbation Plan: Care Instructions. \"  Current as of: October 26, 2020               Content Version: 12.8  © 5124-2996 [employer], [employer]. Care instructions adapted under license by [employer] (which disclaims liability or warranty for this information).  If you have questions about a medical condition or this instruction, always ask your healthcare professional. [address] any warranty or liability for your use of this information.       - Complete course of prednisone and levofloxacin   - use Spiriva daily   - use Advair twice a day   - albuterol as needed

## 2021-04-02 NOTE — PROGRESS NOTES
0710-Bedside shift change report given to Kim Garsia RN (oncoming nurse) by Francy Cook (offgoing nurse). Report included the following information SBAR, Kardex, MAR and Accordion. Pt is resting in bed      0821- VS stable, took all morning meds, denies any pain    0900-Pt eating breakfast    1000- Pt resting in bed    1105- Pt in bed, refused walk with RT now, asked if he could do the walk after lunch, pt did not sleep well last night and would like to rest      1220-Pt walking with RT    1230- VS stable    1500- Discharged education given to patient regarding new/changes in medication as well as f/u appts.  Answered all of pt's question and concerns, IV and tele box remived    1520-Pt escorted by staff member on wheelchair, left in stable condition with all of his belongings and in stable condition

## 2021-04-02 NOTE — PROGRESS NOTES
Problem: Chronic Obstructive Pulmonary Disease (COPD)  Goal: *Oxygen saturation during activity within specified parameters  Outcome: Progressing Towards Goal     Problem: Chronic Obstructive Pulmonary Disease (COPD)  Goal: *Absence of hypoxia  Outcome: Progressing Towards Goal

## 2021-04-02 NOTE — PROGRESS NOTES
8559: Bedside shift change report given to BONITA Walsh (oncoming nurse) by Kristin Bradford  (offgoing nurse).  Report included the following information SBAR, Kardex Cardiac Rhythm NSR

## 2021-04-02 NOTE — DISCHARGE SUMMARY
Hospitalist Discharge Summary     Patient ID:  Cyndie Lindsay  [id number]  61 y.o.  1960    PCP on record: None    Admit date: 4/1/2021  Discharge date and time: 4/2/2021      Admission Diagnoses: COPD exacerbation ([facility] [address] [J44.1]    Discharge Diagnoses: Active Problems:    COPD exacerbation ([facility] [address] (4/1/2021)           Hospital Course:     #Acute COPD exacerbation POA resolved   -  Patient was discharged from [facility] on 3/25/2021 for same reason  - COVID neg   - CXR reviewed independently ,no infiltrates   - 6 MWT No oxygen requirements  During ambulation  -Patient was discharged home on Spiriva, Advair, albuterol as needed and albuterol nebulization as needed  -Patient to complete total 5-day course of steroid and Levaquin  -Follow with pulmonology as an outpatient for the management of COPD  -Patient was counseled extensively to avoid tobacco use and it is not helpful in recovering from COPD  -Patient agreeable and verbalized understanding        #Stage IV lung adenocarcinoma  -Per CT chest scan done at [facility] no evidence of disease 1/2021  -Outpatient heme-onc follow-up  -Patient agreeable and verbalized understanding     #Severe protein calorie chronic malnutrition  -Encourage high-calorie diet  -Recommend outpatient follow-up with nutrition services/primary care     #Substance abuse  -Tobacco and cocaine  -Patient was counseled extensively on the need to abstain from tobacco, its addictive tendencies, its deleterious effects on the lungs, cardiovascular  as well as its financial & social sequelaePatient was counseled extensively on the need to abstain from drugs its addictive tendencies, its deleterious effects on the brain, cardiovascular system, lungs as well as its financial & social sequelae        CONSULTATIONS:  None    Excerpted HPI from H&P of Leighton Banrey MD:  HISTORY OF PRESENT ILLNESS:     Cyndie Lindsay is a 61 y.o.    male with PMH of above mention problems who presents to ED with c/o  Progressive SOB and productive cough since being discharged from [facility] 3/25 , patient states it was hard for him to catch breath, he was not able to relieve his SOB from breathing treatments and inhalers. Patient also endorsed non bloody productive cough. Patient denied fever, chills , chest pain , belly pain or difficulty w/ urination/ bowels. Patient states he completed chemo therapy for lung cancer and follows w/ heme/onc outpatient      Patient smokes 1 pack a day   Endorsed cocaine use     ______________________________________________________________________  DISCHARGE SUMMARY/HOSPITAL COURSE:  for full details see H&P, daily progress notes, labs, consult notes. _______________________________________________________________________  Patient seen and examined by me on discharge day. Pertinent Findings:  Gen:    Not in distress  Chest: Clear lungs  CVS:   Regular rhythm. No edema  Abd:  Soft, not distended, not tender  Neuro:  Alert with good insight. Oriented to person, place, and time   _______________________________________________________________________  DISCHARGE MEDICATIONS:   Current Discharge Medication List      START taking these medications    Details   guaiFENesin ER (MUCINEX) 600 mg ER tablet Take 1 Tab by mouth every twelve (12) hours. Qty: 30 Tab, Refills: 1      levoFLOXacin (LEVAQUIN) 750 mg tablet Take 1 Tab by mouth every twenty-four (24) hours for 2 days. Qty: 2 Tab, Refills: 0      predniSONE (DELTASONE) 20 mg tablet Take 40 mg by mouth daily (with breakfast) for 2 days. Qty: 4 Tab, Refills: 0      fluticasone propion-salmeteroL (Advair Diskus) 100-50 mcg/dose diskus inhaler Take 1 Puff by inhalation two (2) times a day. Qty: 1 Inhaler, Refills: 1         CONTINUE these medications which have CHANGED    Details   albuterol (Ventolin HFA) 90 mcg/actuation inhaler Take 1 Puff by inhalation two (2) times a day.   Qty: 1 Inhaler, Refills: 1 CONTINUE these medications which have NOT CHANGED    Details   tiotropium (Spiriva with HandiHaler) 18 mcg inhalation capsule Take 1 Cap by inhalation daily. albuterol-ipratropium (DUO-NEB) 2.5 mg-0.5 mg/3 ml nebu 3 mL by Nebulization route four (4) times daily as needed for Wheezing. STOP taking these medications       ibuprofen (MOTRIN) 200 mg tablet Comments:   Reason for Stopping:               My Recommended Diet, Activity, Wound Care, and follow-up labs are listed in the patient's Discharge Insturctions which I have personally completed and reviewed. _______________________________________________________________________  DISPOSITION:     Home with Family: x   Home with HH/PT/OT/RN:    SNF/LTC:    COLEEN:    OTHER:        Condition at Discharge:  Stable  _______________________________________________________________________  Follow up with:   PCP : None  Follow-up Information     Follow up With Specialties Details Why [address]  On 4/8/2021 Your appointment time is 1pm.  You will be seeing Nurse Blanka Gerber.  , Bring ins card, picture id, and discharge papers, Please keep this appointment, Please arrive 15 minutes early. [address]  [phone]    [facility]  On 4/13/2021 Your appointment time is 2:30pm.   [facility] AND [address]  [phone]    Masha Littlejohn MD  On 4/2/2021 Please call to make a follow-up appointment.  [address] [facility]  [phone]                Total time in minutes spent coordinating this discharge (includes going over instructions, follow-up, prescriptions, and preparing report for sign off to her PCP) :  35 minutes    Signed:  Дмитрий Tena MD

## 2021-04-02 NOTE — PROGRESS NOTES
Comprehensive Nutrition Assessment    Type and Reason for Visit: (P) Initial, Consult    Nutrition Recommendations/Plan: High kcal high protein meal trays with high kcal supplements on lunch and dinner trays. Nutrition Assessment:  (P) Pt admitted with COPD exacerbation not abated by home medications. Recently admitted to [facility] for same dx. Pt continues to smoke, UDS + cocaine this admission. Hx notable for lung cancer stg IV, low weight/BMI, markers for malnutrition  Pt meets ASPEN criteria for severe malnutrition. Meets Criteria for Chronic Malnutrition   [] Severe Malnutrition, as evidenced by:   [x] Severe muscle wasting, loss of subcutaneous fat   [x] Nutritional intake of <75% of recommended intake for >1 month   [x] Weight loss of  >5% in 1 month, >7.5% in 3 months, >10% in 6 months, >20% in 1 year   [] Severe edema   []Moderate Malnutrition, as evidenced by:   [] Mild muscle wasting, loss of subcutaneous fat   [] Nutritional intake <75% of recommended intake for >1 month   [] Weight loss of  5% in 1 month, 7.5% in 3 months, 10% in 6 months, or 20% in 1 year   [] Mild edema         Malnutrition Assessment:  Malnutrition Status:     severe  Context:      chronic illness  Findings of the 6 clinical characteristics of malnutrition:   Energy Intake:   < 50%  Weight Loss:      10% x 6 months  Body Fat Loss:   ,   severe  Muscle Mass Loss:   ,  moderate  Fluid Accumulation:   ,  mild   Strength:    n/a      Estimated Daily Nutrient Needs:  Energy (kcal): (P) 1985; Weight Used for Energy Requirements: (P) Current  Protein (g): (P) 74.4(1.4 g/kg);  Weight Used for Protein Requirements: (P) Current  Fluid (ml/day): (P) 1985; Method Used for Fluid Requirements: (P) 1 ml/kcal      Nutrition Related Findings:  (P) Pt not eating well, per chart recent wt loss      Wounds:    (P) None       Current Nutrition Therapies:  DIET REGULAR High Pro/Julius Protein  DIET NUTRITIONAL SUPPLEMENTS Dinner; Ensure Pudding  DIET NUTRITIONAL SUPPLEMENTS Lunch; Magic Cups    Anthropometric Measures:  Height:  (P) 5' 6\" (167.6 cm)  Current Body Wt:  (P) 52.7 kg (116 lb 1.6 oz)   Admission Body Wt:       Usual Body Wt:        Ideal Body Wt:  (P) 142 lbs:  (P) 81.8 %   Adjusted Body Weight:   ; Weight Adjustment for: (P) No adjustment   Adjusted BMI:       BMI Category:  (P) Normal weight (BMI 18.5-24. 9)       Nutrition Diagnosis:   (P) Inadequate protein-energy intake, Increased nutrient needs, Severe malnutrition, In context of chronic illness, Unintended weight loss, Altered taste perception, Limited adherence to nutrition-related recommendations related to (P) catabolic illness, impaired respiratory function, inadequate protein-energy intake, increased demand for energy/nutrients, food and nutrition related knowledge deficit, partial or complete edentulism as evidenced by (P) intake 26-50%, poor intake prior to admission, BMI, weight loss, weight loss greater than or equal to 10% in 6 months, severe loss of subcutaneous fat, moderate muscle loss, constipation, poor dentition    Nutrition Interventions:   Food and/or Nutrient Delivery: (P) Continue current diet, Continue oral nutrition supplement  Nutrition Education and Counseling: (P) Education not indicated  Coordination of Nutrition Care: (P) Continue to monitor while inpatient    Goals:  (P) PO intake > 75% of meals and ONS next 4-7 days       Nutrition Monitoring and Evaluation:   Behavioral-Environmental Outcomes: (P) Beliefs and attitudes, Readiness for change  Food/Nutrient Intake Outcomes: (P) Diet advancement/tolerance, Food and nutrient intake, Supplement intake  Physical Signs/Symptoms Outcomes: (P) Biochemical data, Hemodynamic status, Meal time behavior, Nutrition focused physical findings, Weight    Discharge Planning:    (P) Continue oral nutrition supplement, Continue current diet     Electronically signed by Demarco Calvert, PhD, RD, [facility] on 4/2/2021 at 8:10 AM    Contact: [phone]

## 2021-04-12 ENCOUNTER — TELEPHONE (OUTPATIENT)
Dept: CASE MANAGEMENT | Age: 61
End: 2021-04-12

## 2021-04-12 NOTE — TELEPHONE ENCOUNTER
CM called patient for the purpose of follow up to inpatient admission to check on environmental challenges/medications/appointment follow up/and questions/concerns. . The call was answered by patient CHRIS CM left VM. 1st attempt 4/5/21 left VM. 2nd attempt 4/12/2021 left VM. CM will close case if patient return call CM will assist with questions or concerns.     [address] [place] [address]  [phone]

## 2022-03-19 PROBLEM — J44.1 COPD EXACERBATION (HCC): Status: ACTIVE | Noted: 2021-04-01

## 2024-10-29 ENCOUNTER — HOSPITAL ENCOUNTER (EMERGENCY)
Facility: HOSPITAL | Age: 64
Discharge: HOME | End: 2024-10-29
Payer: MEDICAID

## 2024-10-29 ENCOUNTER — APPOINTMENT (OUTPATIENT)
Facility: HOSPITAL | Age: 64
End: 2024-10-29
Payer: MEDICAID

## 2024-10-29 VITALS
TEMPERATURE: 98.2 F | WEIGHT: 108.6 LBS | OXYGEN SATURATION: 98 % | BODY MASS INDEX: 17.45 KG/M2 | HEART RATE: 73 BPM | DIASTOLIC BLOOD PRESSURE: 89 MMHG | HEIGHT: 66 IN | SYSTOLIC BLOOD PRESSURE: 125 MMHG | RESPIRATION RATE: 19 BRPM

## 2024-10-29 DIAGNOSIS — R06.02 SHORTNESS OF BREATH: ICD-10-CM

## 2024-10-29 DIAGNOSIS — F14.10 COCAINE ABUSE (HCC): ICD-10-CM

## 2024-10-29 DIAGNOSIS — F17.200 SMOKING ADDICTION: ICD-10-CM

## 2024-10-29 DIAGNOSIS — J44.1 COPD EXACERBATION (HCC): Primary | ICD-10-CM

## 2024-10-29 LAB
ALBUMIN SERPL-MCNC: 2.6 G/DL (ref 3.5–5)
ALBUMIN/GLOB SERPL: 0.4 (ref 1.1–2.2)
ALP SERPL-CCNC: 100 U/L (ref 45–117)
ALT SERPL-CCNC: 14 U/L (ref 12–78)
ANION GAP SERPL CALC-SCNC: 6 MMOL/L (ref 2–12)
AST SERPL-CCNC: 21 U/L (ref 15–37)
BASOPHILS # BLD: 0.1 K/UL (ref 0–0.1)
BASOPHILS NFR BLD: 1 % (ref 0–1)
BILIRUB SERPL-MCNC: 0.5 MG/DL (ref 0.2–1)
BUN SERPL-MCNC: 5 MG/DL (ref 6–20)
BUN/CREAT SERPL: 8 (ref 12–20)
CALCIUM SERPL-MCNC: 9.3 MG/DL (ref 8.5–10.1)
CHLORIDE SERPL-SCNC: 99 MMOL/L (ref 97–108)
CO2 SERPL-SCNC: 28 MMOL/L (ref 21–32)
CREAT SERPL-MCNC: 0.63 MG/DL (ref 0.7–1.3)
DIFFERENTIAL METHOD BLD: ABNORMAL
EOSINOPHIL # BLD: 0 K/UL (ref 0–0.4)
EOSINOPHIL NFR BLD: 1 % (ref 0–7)
ERYTHROCYTE [DISTWIDTH] IN BLOOD BY AUTOMATED COUNT: 13.1 % (ref 11.5–14.5)
GLOBULIN SER CALC-MCNC: 5.9 G/DL (ref 2–4)
GLUCOSE BLD STRIP.AUTO-MCNC: 80 MG/DL (ref 65–117)
GLUCOSE SERPL-MCNC: 59 MG/DL (ref 65–100)
HCT VFR BLD AUTO: 40.2 % (ref 36.6–50.3)
HGB BLD-MCNC: 13.3 G/DL (ref 12.1–17)
IMM GRANULOCYTES # BLD AUTO: 0 K/UL (ref 0–0.04)
IMM GRANULOCYTES NFR BLD AUTO: 0 % (ref 0–0.5)
LYMPHOCYTES # BLD: 2.5 K/UL (ref 0.8–3.5)
LYMPHOCYTES NFR BLD: 31 % (ref 12–49)
MAGNESIUM SERPL-MCNC: 1.6 MG/DL (ref 1.6–2.4)
MCH RBC QN AUTO: 31.6 PG (ref 26–34)
MCHC RBC AUTO-ENTMCNC: 33.1 G/DL (ref 30–36.5)
MCV RBC AUTO: 95.5 FL (ref 80–99)
MONOCYTES # BLD: 0.8 K/UL (ref 0–1)
MONOCYTES NFR BLD: 10 % (ref 5–13)
NEUTS SEG # BLD: 4.7 K/UL (ref 1.8–8)
NEUTS SEG NFR BLD: 57 % (ref 32–75)
NRBC # BLD: 0 K/UL (ref 0–0.01)
NRBC BLD-RTO: 0 PER 100 WBC
NT PRO BNP: 193 PG/ML (ref 0–125)
PLATELET # BLD AUTO: 358 K/UL (ref 150–400)
PMV BLD AUTO: 8 FL (ref 8.9–12.9)
POTASSIUM SERPL-SCNC: 3.4 MMOL/L (ref 3.5–5.1)
POTASSIUM SERPL-SCNC: 3.8 MMOL/L (ref 3.5–5.1)
PROT SERPL-MCNC: 8.5 G/DL (ref 6.4–8.2)
RBC # BLD AUTO: 4.21 M/UL (ref 4.1–5.7)
SERVICE CMNT-IMP: NORMAL
SODIUM SERPL-SCNC: 133 MMOL/L (ref 136–145)
TROPONIN I SERPL HS-MCNC: 6 NG/L (ref 0–76)
WBC # BLD AUTO: 8.1 K/UL (ref 4.1–11.1)

## 2024-10-29 PROCEDURE — 94761 N-INVAS EAR/PLS OXIMETRY MLT: CPT

## 2024-10-29 PROCEDURE — 99285 EMERGENCY DEPT VISIT HI MDM: CPT

## 2024-10-29 PROCEDURE — 84132 ASSAY OF SERUM POTASSIUM: CPT

## 2024-10-29 PROCEDURE — 80053 COMPREHEN METABOLIC PANEL: CPT

## 2024-10-29 PROCEDURE — 83880 ASSAY OF NATRIURETIC PEPTIDE: CPT

## 2024-10-29 PROCEDURE — 94640 AIRWAY INHALATION TREATMENT: CPT

## 2024-10-29 PROCEDURE — 36415 COLL VENOUS BLD VENIPUNCTURE: CPT

## 2024-10-29 PROCEDURE — 83735 ASSAY OF MAGNESIUM: CPT

## 2024-10-29 PROCEDURE — 6370000000 HC RX 637 (ALT 250 FOR IP)

## 2024-10-29 PROCEDURE — 71046 X-RAY EXAM CHEST 2 VIEWS: CPT

## 2024-10-29 PROCEDURE — 85025 COMPLETE CBC W/AUTO DIFF WBC: CPT

## 2024-10-29 PROCEDURE — 82962 GLUCOSE BLOOD TEST: CPT

## 2024-10-29 PROCEDURE — 84484 ASSAY OF TROPONIN QUANT: CPT

## 2024-10-29 RX ORDER — BENZONATATE 100 MG/1
100 CAPSULE ORAL
Status: COMPLETED | OUTPATIENT
Start: 2024-10-29 | End: 2024-10-29

## 2024-10-29 RX ORDER — IPRATROPIUM BROMIDE AND ALBUTEROL SULFATE 2.5; .5 MG/3ML; MG/3ML
1 SOLUTION RESPIRATORY (INHALATION)
Status: COMPLETED | OUTPATIENT
Start: 2024-10-29 | End: 2024-10-29

## 2024-10-29 RX ORDER — PREDNISONE 20 MG/1
40 TABLET ORAL DAILY
Qty: 20 TABLET | Refills: 0 | Status: ON HOLD | OUTPATIENT
Start: 2024-10-29 | End: 2024-11-08

## 2024-10-29 RX ORDER — PREDNISONE 20 MG/1
40 TABLET ORAL DAILY
Qty: 14 TABLET | Refills: 0 | Status: CANCELLED | OUTPATIENT
Start: 2024-10-29 | End: 2024-11-05

## 2024-10-29 RX ORDER — POTASSIUM CHLORIDE 750 MG/1
20 TABLET, EXTENDED RELEASE ORAL ONCE
Status: COMPLETED | OUTPATIENT
Start: 2024-10-29 | End: 2024-10-29

## 2024-10-29 RX ORDER — ALBUTEROL SULFATE 90 UG/1
2 INHALANT RESPIRATORY (INHALATION) 4 TIMES DAILY PRN
Qty: 18 G | Refills: 0 | Status: ON HOLD | OUTPATIENT
Start: 2024-10-29

## 2024-10-29 RX ORDER — PREDNISONE 20 MG/1
40 TABLET ORAL
Status: COMPLETED | OUTPATIENT
Start: 2024-10-29 | End: 2024-10-29

## 2024-10-29 RX ORDER — ACETAMINOPHEN 500 MG
1000 TABLET ORAL
Status: COMPLETED | OUTPATIENT
Start: 2024-10-29 | End: 2024-10-29

## 2024-10-29 RX ADMIN — IPRATROPIUM BROMIDE AND ALBUTEROL SULFATE 1 DOSE: .5; 3 SOLUTION RESPIRATORY (INHALATION) at 15:12

## 2024-10-29 RX ADMIN — ACETAMINOPHEN 1000 MG: 500 TABLET ORAL at 16:32

## 2024-10-29 RX ADMIN — PREDNISONE 40 MG: 20 TABLET ORAL at 14:55

## 2024-10-29 RX ADMIN — IPRATROPIUM BROMIDE AND ALBUTEROL SULFATE 1 DOSE: .5; 3 SOLUTION RESPIRATORY (INHALATION) at 16:36

## 2024-10-29 RX ADMIN — POTASSIUM CHLORIDE 20 MEQ: 750 TABLET, EXTENDED RELEASE ORAL at 16:58

## 2024-10-29 RX ADMIN — BENZONATATE 100 MG: 100 CAPSULE ORAL at 16:31

## 2024-10-29 ASSESSMENT — PAIN SCALES - GENERAL
PAINLEVEL_OUTOF10: 10
PAINLEVEL_OUTOF10: 6

## 2024-10-29 ASSESSMENT — PAIN DESCRIPTION - DESCRIPTORS: DESCRIPTORS: ACHING

## 2024-10-29 ASSESSMENT — PAIN DESCRIPTION - LOCATION: LOCATION: BACK;CHEST

## 2024-10-29 ASSESSMENT — PAIN - FUNCTIONAL ASSESSMENT: PAIN_FUNCTIONAL_ASSESSMENT: 0-10

## 2024-10-29 ASSESSMENT — PAIN DESCRIPTION - ORIENTATION: ORIENTATION: RIGHT

## 2024-10-29 NOTE — ED NOTES
Pt ambulated with a steady independent gait. He denied dizziness or SOB with exertion. Pt O2 maintained at 98% on room air.

## 2024-10-29 NOTE — ED NOTES
Discharge instructions were given to the patient by YING Jones  .     The patient left the Emergency Department ambulatory, alert and oriented and in no acute distress with 2 prescriptions. The patient was encouraged to call or return to the ED for worsening issues or problems and was encouraged to schedule a follow up appointment for continuing care. Patient discharged home ambulatory with a steady gait.    The patient verbalized understanding of discharge instructions and prescriptions, all questions were answered. The patient has no further concerns at this time.

## 2024-10-29 NOTE — ED NOTES
Pt presents ambulatory to ED complaining of cough and SOB x3 weeks. Pt reports hx of COPD and denies taking his daily medications due to being out, he reports having refills available at the pharmacy tomorrow. Pt has used OTC cough medication without relief. He denies chest pain or fevers. Pt is alert and oriented x 4, RR even and unlabored, skin is warm and dry. Assessment completed and pt updated on plan of care.        Emergency Department Nursing Plan of Care        The Nursing Plan of Care is developed from the Nursing assessment and Emergency Department Attending provider initial evaluation.  The plan of care may be reviewed in the “ED Provider note”.

## 2024-10-29 NOTE — ED PROVIDER NOTES
[facility] EMERGENCY DEPT  EMERGENCY DEPARTMENT ENCOUNTER       Pt Name: Fab Gonzalez  MRN: [id number]  Birthdate 1960  Date of evaluation: 10/29/2024  Provider: Faustina Jones PA-C   PCP: No primary care provider on file.  Note Started: 2:37 PM EDT 10/29/24     CHIEF COMPLAINT       Chief Complaint   Patient presents with    Shortness of Breath        HISTORY OF PRESENT ILLNESS: 1 or more elements      History From: Patient and Family  HPI Limitations: None     Fab Gonzalez is a 64 y.o. male who presents to the emergency department with family member for evaluation of shortness of breath with cough x 3 to 4 weeks.  Patient reports history of COPD/emphysema, states he has been out of all of his daily medications for approximately 2 months.  Patient states he was able to get refills, however they will not be filled until tomorrow.  Patient states that because of his cough he feels like his abdominal muscles are sore, and his chest is tight.  Patient additionally reports history of lung cancer, in remission x 3 years.  Reports trying some over-the-counter cough medication without relief.  Denies any known fevers, denies vomiting or diarrhea.  Patient does report smoking crack cocaine most days.  Reports smoking cigarettes daily.     Nursing Notes were all reviewed and agreed with or any disagreements were addressed in the HPI.     REVIEW OF SYSTEMS      Review of Systems     Positives and Pertinent negatives as per HPI.    PAST HISTORY     Past Medical History:  Past Medical History:   Diagnosis Date    Asthma        Past Surgical History:  Past Surgical History:   Procedure Laterality Date    OTHER SURGICAL HISTORY      \"surgery for my lung\"       Family History:  No family history on file.    Social History:  Social History     Tobacco Use    Smoking status: Some Days     Current packs/day: 0.25     Types: Cigarettes    Smokeless tobacco: Never   Substance Use Topics    Alcohol use: Yes    Drug use: Not Currently

## 2024-10-29 NOTE — ED PROVIDER NOTES
EKG 12 Lead    Date/Time: 10/29/2024 2:13 PM    Performed by: Ramon Herndon MD  Authorized by: Ramon Herndon MD    ECG interpreted by ED Physician in the absence of a cardiologist: yes    Interpretation:     Interpretation: normal    Rate:     ECG rate assessment: normal    Rhythm:     Rhythm: sinus rhythm    Ectopy:     Ectopy: none          Ramon Herndon MD  10/29/24 1413

## 2024-10-30 LAB
EKG ATRIAL RATE: 80 BPM
EKG DIAGNOSIS: NORMAL
EKG P AXIS: 85 DEGREES
EKG P-R INTERVAL: 134 MS
EKG Q-T INTERVAL: 362 MS
EKG QRS DURATION: 74 MS
EKG QTC CALCULATION (BAZETT): 417 MS
EKG R AXIS: 79 DEGREES
EKG T AXIS: 70 DEGREES
EKG VENTRICULAR RATE: 80 BPM

## 2024-11-03 ENCOUNTER — HOSPITAL ENCOUNTER (INPATIENT)
Facility: HOSPITAL | Age: 64
LOS: 3 days | Discharge: ANOTHER HOSPITAL | DRG: 139 | End: 2024-11-06
Attending: EMERGENCY MEDICINE | Admitting: HOSPITALIST
Payer: MEDICAID

## 2024-11-03 ENCOUNTER — APPOINTMENT (OUTPATIENT)
Facility: HOSPITAL | Age: 64
DRG: 139 | End: 2024-11-03
Payer: MEDICAID

## 2024-11-03 DIAGNOSIS — J44.9 SEVERE CHRONIC OBSTRUCTIVE PULMONARY DISEASE (HCC): Primary | ICD-10-CM

## 2024-11-03 DIAGNOSIS — C34.90 STAGE 4 MALIGNANT NEOPLASM OF LUNG, UNSPECIFIED LATERALITY (HCC): ICD-10-CM

## 2024-11-03 DIAGNOSIS — R06.03 ACUTE RESPIRATORY DISTRESS: ICD-10-CM

## 2024-11-03 DIAGNOSIS — F14.10 COCAINE ABUSE (HCC): ICD-10-CM

## 2024-11-03 DIAGNOSIS — J96.01 ACUTE RESPIRATORY FAILURE WITH HYPOXIA: ICD-10-CM

## 2024-11-03 DIAGNOSIS — R06.02 SHORTNESS OF BREATH: ICD-10-CM

## 2024-11-03 LAB
-: NORMAL
ALBUMIN SERPL-MCNC: 3 G/DL (ref 3.5–5)
ALBUMIN/GLOB SERPL: 0.5 (ref 1.1–2.2)
ALP SERPL-CCNC: 107 U/L (ref 45–117)
ALT SERPL-CCNC: 18 U/L (ref 12–78)
ANION GAP SERPL CALC-SCNC: 8 MMOL/L (ref 2–12)
AST SERPL-CCNC: 21 U/L (ref 15–37)
BASE EXCESS BLDV CALC-SCNC: 2.9 MMOL/L
BASOPHILS # BLD: 0.1 K/UL (ref 0–0.1)
BASOPHILS NFR BLD: 0 % (ref 0–1)
BDY SITE: NORMAL
BILIRUB SERPL-MCNC: 0.6 MG/DL (ref 0.2–1)
BREATHS.SPONTANEOUS ON VENT: 26
BUN SERPL-MCNC: 10 MG/DL (ref 6–20)
BUN/CREAT SERPL: 13 (ref 12–20)
CALCIUM SERPL-MCNC: 9.3 MG/DL (ref 8.5–10.1)
CHLORIDE SERPL-SCNC: 95 MMOL/L (ref 97–108)
CO2 SERPL-SCNC: 30 MMOL/L (ref 21–32)
CREAT SERPL-MCNC: 0.75 MG/DL (ref 0.7–1.3)
DIFFERENTIAL METHOD BLD: ABNORMAL
EOSINOPHIL # BLD: 0 K/UL (ref 0–0.4)
EOSINOPHIL NFR BLD: 0 % (ref 0–7)
ERYTHROCYTE [DISTWIDTH] IN BLOOD BY AUTOMATED COUNT: 13.6 % (ref 11.5–14.5)
FIO2 ON VENT: 1 %
FLUAV RNA SPEC QL NAA+PROBE: NOT DETECTED
FLUBV RNA SPEC QL NAA+PROBE: NOT DETECTED
GLOBULIN SER CALC-MCNC: 5.5 G/DL (ref 2–4)
GLUCOSE SERPL-MCNC: 75 MG/DL (ref 65–100)
HAV IGM SER QL: NONREACTIVE
HBV CORE IGM SER QL: NONREACTIVE
HBV SURFACE AG SER QL: <0.1 INDEX
HBV SURFACE AG SER QL: NEGATIVE
HCO3 BLDV-SCNC: 28 MMOL/L (ref 23–28)
HCT VFR BLD AUTO: 43.7 % (ref 36.6–50.3)
HCV AB SER IA-ACNC: 0.03 INDEX
HCV AB SERPL QL IA: NONREACTIVE
HGB BLD-MCNC: 15.1 G/DL (ref 12.1–17)
IMM GRANULOCYTES # BLD AUTO: 0.1 K/UL (ref 0–0.04)
IMM GRANULOCYTES NFR BLD AUTO: 0 % (ref 0–0.5)
LYMPHOCYTES # BLD: 3.4 K/UL (ref 0.8–3.5)
LYMPHOCYTES NFR BLD: 24 % (ref 12–49)
MAGNESIUM SERPL-MCNC: 1.4 MG/DL (ref 1.6–2.4)
MCH RBC QN AUTO: 32.1 PG (ref 26–34)
MCHC RBC AUTO-ENTMCNC: 34.6 G/DL (ref 30–36.5)
MCV RBC AUTO: 93 FL (ref 80–99)
MONOCYTES # BLD: 1.2 K/UL (ref 0–1)
MONOCYTES NFR BLD: 8 % (ref 5–13)
NEUTS SEG # BLD: 9.7 K/UL (ref 1.8–8)
NEUTS SEG NFR BLD: 68 % (ref 32–75)
NRBC # BLD: 0 K/UL (ref 0–0.01)
NRBC BLD-RTO: 0 PER 100 WBC
NT PRO BNP: 203 PG/ML (ref 0–125)
PCO2 BLDV: 45.7 MMHG (ref 41–51)
PH BLDV: 7.41 (ref 7.32–7.42)
PLATELET # BLD AUTO: 371 K/UL (ref 150–400)
PMV BLD AUTO: 8.3 FL (ref 8.9–12.9)
PO2 BLDV: 34 MMHG (ref 25–40)
POTASSIUM SERPL-SCNC: 3.7 MMOL/L (ref 3.5–5.1)
PROT SERPL-MCNC: 8.5 G/DL (ref 6.4–8.2)
RBC # BLD AUTO: 4.7 M/UL (ref 4.1–5.7)
SAO2 % BLDV: 65 % (ref 65–88)
SAO2% DEVICE SAO2% SENSOR NAME: NORMAL
SARS-COV-2 RNA RESP QL NAA+PROBE: NOT DETECTED
SODIUM SERPL-SCNC: 133 MMOL/L (ref 136–145)
SOURCE: NORMAL
SPECIMEN SITE: NORMAL
TROPONIN I SERPL HS-MCNC: 11 NG/L (ref 0–76)
WBC # BLD AUTO: 14.3 K/UL (ref 4.1–11.1)

## 2024-11-03 PROCEDURE — 93005 ELECTROCARDIOGRAM TRACING: CPT | Performed by: EMERGENCY MEDICINE

## 2024-11-03 PROCEDURE — 6370000000 HC RX 637 (ALT 250 FOR IP): Performed by: HOSPITALIST

## 2024-11-03 PROCEDURE — 96375 TX/PRO/DX INJ NEW DRUG ADDON: CPT

## 2024-11-03 PROCEDURE — 83880 ASSAY OF NATRIURETIC PEPTIDE: CPT

## 2024-11-03 PROCEDURE — 99285 EMERGENCY DEPT VISIT HI MDM: CPT

## 2024-11-03 PROCEDURE — 80074 ACUTE HEPATITIS PANEL: CPT

## 2024-11-03 PROCEDURE — 96368 THER/DIAG CONCURRENT INF: CPT

## 2024-11-03 PROCEDURE — 80053 COMPREHEN METABOLIC PANEL: CPT

## 2024-11-03 PROCEDURE — 94640 AIRWAY INHALATION TREATMENT: CPT

## 2024-11-03 PROCEDURE — 6370000000 HC RX 637 (ALT 250 FOR IP): Performed by: INTERNAL MEDICINE

## 2024-11-03 PROCEDURE — 6360000002 HC RX W HCPCS: Performed by: EMERGENCY MEDICINE

## 2024-11-03 PROCEDURE — 85025 COMPLETE CBC W/AUTO DIFF WBC: CPT

## 2024-11-03 PROCEDURE — 94761 N-INVAS EAR/PLS OXIMETRY MLT: CPT

## 2024-11-03 PROCEDURE — 6370000000 HC RX 637 (ALT 250 FOR IP): Performed by: EMERGENCY MEDICINE

## 2024-11-03 PROCEDURE — 71045 X-RAY EXAM CHEST 1 VIEW: CPT

## 2024-11-03 PROCEDURE — 1200000000 HC SEMI PRIVATE

## 2024-11-03 PROCEDURE — 2580000003 HC RX 258: Performed by: HOSPITALIST

## 2024-11-03 PROCEDURE — 6360000002 HC RX W HCPCS: Performed by: HOSPITALIST

## 2024-11-03 PROCEDURE — 84484 ASSAY OF TROPONIN QUANT: CPT

## 2024-11-03 PROCEDURE — 2700000000 HC OXYGEN THERAPY PER DAY

## 2024-11-03 PROCEDURE — 87636 SARSCOV2 & INF A&B AMP PRB: CPT

## 2024-11-03 PROCEDURE — 83735 ASSAY OF MAGNESIUM: CPT

## 2024-11-03 PROCEDURE — 96365 THER/PROPH/DIAG IV INF INIT: CPT

## 2024-11-03 PROCEDURE — 82803 BLOOD GASES ANY COMBINATION: CPT

## 2024-11-03 PROCEDURE — 36415 COLL VENOUS BLD VENIPUNCTURE: CPT

## 2024-11-03 PROCEDURE — 2580000003 HC RX 258: Performed by: EMERGENCY MEDICINE

## 2024-11-03 RX ORDER — KETOROLAC TROMETHAMINE 30 MG/ML
30 INJECTION, SOLUTION INTRAMUSCULAR; INTRAVENOUS
Status: COMPLETED | OUTPATIENT
Start: 2024-11-03 | End: 2024-11-03

## 2024-11-03 RX ORDER — FOLIC ACID 1 MG/1
1 TABLET ORAL DAILY
Status: DISCONTINUED | OUTPATIENT
Start: 2024-11-03 | End: 2024-11-06 | Stop reason: HOSPADM

## 2024-11-03 RX ORDER — LORAZEPAM 2 MG/ML
1 INJECTION INTRAMUSCULAR
Status: DISCONTINUED | OUTPATIENT
Start: 2024-11-03 | End: 2024-11-06 | Stop reason: HOSPADM

## 2024-11-03 RX ORDER — ALBUTEROL SULFATE 0.83 MG/ML
5 SOLUTION RESPIRATORY (INHALATION) ONCE
Status: COMPLETED | OUTPATIENT
Start: 2024-11-03 | End: 2024-11-03

## 2024-11-03 RX ORDER — ROSUVASTATIN CALCIUM 20 MG/1
20 TABLET, COATED ORAL DAILY
Status: ON HOLD | COMMUNITY
Start: 2024-10-30 | End: 2025-04-28

## 2024-11-03 RX ORDER — SODIUM CHLORIDE 9 MG/ML
INJECTION, SOLUTION INTRAVENOUS PRN
Status: DISCONTINUED | OUTPATIENT
Start: 2024-11-03 | End: 2024-11-06 | Stop reason: HOSPADM

## 2024-11-03 RX ORDER — ENOXAPARIN SODIUM 100 MG/ML
30 INJECTION SUBCUTANEOUS DAILY
Status: DISCONTINUED | OUTPATIENT
Start: 2024-11-03 | End: 2024-11-06 | Stop reason: HOSPADM

## 2024-11-03 RX ORDER — 0.9 % SODIUM CHLORIDE 0.9 %
1000 INTRAVENOUS SOLUTION INTRAVENOUS ONCE
Status: DISCONTINUED | OUTPATIENT
Start: 2024-11-03 | End: 2024-11-05 | Stop reason: HOSPADM

## 2024-11-03 RX ORDER — ASPIRIN 81 MG/1
81 TABLET ORAL DAILY
Status: ON HOLD | COMMUNITY
Start: 2024-10-30

## 2024-11-03 RX ORDER — ACETAMINOPHEN 650 MG/1
650 SUPPOSITORY RECTAL EVERY 6 HOURS PRN
Status: DISCONTINUED | OUTPATIENT
Start: 2024-11-03 | End: 2024-11-06 | Stop reason: HOSPADM

## 2024-11-03 RX ORDER — ALBUTEROL SULFATE 0.83 MG/ML
2.5 SOLUTION RESPIRATORY (INHALATION) EVERY 6 HOURS PRN
Status: DISCONTINUED | OUTPATIENT
Start: 2024-11-03 | End: 2024-11-06 | Stop reason: HOSPADM

## 2024-11-03 RX ORDER — LORAZEPAM 2 MG/ML
3 INJECTION INTRAMUSCULAR
Status: DISCONTINUED | OUTPATIENT
Start: 2024-11-03 | End: 2024-11-06 | Stop reason: HOSPADM

## 2024-11-03 RX ORDER — ONDANSETRON 2 MG/ML
4 INJECTION INTRAMUSCULAR; INTRAVENOUS EVERY 6 HOURS PRN
Status: DISCONTINUED | OUTPATIENT
Start: 2024-11-03 | End: 2024-11-06 | Stop reason: HOSPADM

## 2024-11-03 RX ORDER — MAGNESIUM HYDROXIDE/ALUMINUM HYDROXICE/SIMETHICONE 120; 1200; 1200 MG/30ML; MG/30ML; MG/30ML
30 SUSPENSION ORAL EVERY 6 HOURS PRN
Status: DISCONTINUED | OUTPATIENT
Start: 2024-11-03 | End: 2024-11-06 | Stop reason: HOSPADM

## 2024-11-03 RX ORDER — LANOLIN ALCOHOL/MO/W.PET/CERES
100 CREAM (GRAM) TOPICAL DAILY
Status: DISCONTINUED | OUTPATIENT
Start: 2024-11-03 | End: 2024-11-06 | Stop reason: HOSPADM

## 2024-11-03 RX ORDER — LORAZEPAM 2 MG/ML
2 INJECTION INTRAMUSCULAR
Status: DISCONTINUED | OUTPATIENT
Start: 2024-11-03 | End: 2024-11-06 | Stop reason: HOSPADM

## 2024-11-03 RX ORDER — ACETAMINOPHEN 325 MG/1
650 TABLET ORAL EVERY 6 HOURS PRN
Status: DISCONTINUED | OUTPATIENT
Start: 2024-11-03 | End: 2024-11-06 | Stop reason: HOSPADM

## 2024-11-03 RX ORDER — LORAZEPAM 2 MG/ML
4 INJECTION INTRAMUSCULAR
Status: DISCONTINUED | OUTPATIENT
Start: 2024-11-03 | End: 2024-11-06 | Stop reason: HOSPADM

## 2024-11-03 RX ORDER — SODIUM CHLORIDE 0.9 % (FLUSH) 0.9 %
5-40 SYRINGE (ML) INJECTION EVERY 12 HOURS SCHEDULED
Status: DISCONTINUED | OUTPATIENT
Start: 2024-11-03 | End: 2024-11-06 | Stop reason: HOSPADM

## 2024-11-03 RX ORDER — MAGNESIUM SULFATE IN WATER 40 MG/ML
2000 INJECTION, SOLUTION INTRAVENOUS ONCE
Status: COMPLETED | OUTPATIENT
Start: 2024-11-03 | End: 2024-11-03

## 2024-11-03 RX ORDER — SODIUM CHLORIDE 0.9 % (FLUSH) 0.9 %
5-40 SYRINGE (ML) INJECTION PRN
Status: DISCONTINUED | OUTPATIENT
Start: 2024-11-03 | End: 2024-11-06 | Stop reason: HOSPADM

## 2024-11-03 RX ORDER — TAMSULOSIN HYDROCHLORIDE 0.4 MG/1
0.4 CAPSULE ORAL DAILY
Status: ON HOLD | COMMUNITY
Start: 2024-10-30 | End: 2025-10-30

## 2024-11-03 RX ORDER — MORPHINE SULFATE 4 MG/ML
4 INJECTION, SOLUTION INTRAMUSCULAR; INTRAVENOUS
Status: COMPLETED | OUTPATIENT
Start: 2024-11-03 | End: 2024-11-03

## 2024-11-03 RX ORDER — IPRATROPIUM BROMIDE AND ALBUTEROL SULFATE 2.5; .5 MG/3ML; MG/3ML
1 SOLUTION RESPIRATORY (INHALATION) EVERY 4 HOURS PRN
Status: ON HOLD | COMMUNITY
Start: 2024-10-30 | End: 2025-10-30

## 2024-11-03 RX ORDER — POLYETHYLENE GLYCOL 3350 17 G/17G
17 POWDER, FOR SOLUTION ORAL DAILY PRN
Status: DISCONTINUED | OUTPATIENT
Start: 2024-11-03 | End: 2024-11-06 | Stop reason: HOSPADM

## 2024-11-03 RX ORDER — IPRATROPIUM BROMIDE AND ALBUTEROL SULFATE 2.5; .5 MG/3ML; MG/3ML
3 SOLUTION RESPIRATORY (INHALATION)
Status: COMPLETED | OUTPATIENT
Start: 2024-11-03 | End: 2024-11-03

## 2024-11-03 RX ORDER — LORAZEPAM 1 MG/1
1 TABLET ORAL
Status: DISCONTINUED | OUTPATIENT
Start: 2024-11-03 | End: 2024-11-06 | Stop reason: HOSPADM

## 2024-11-03 RX ORDER — FLUTICASONE PROPIONATE AND SALMETEROL 250; 50 UG/1; UG/1
1 POWDER RESPIRATORY (INHALATION) 2 TIMES DAILY
Status: ON HOLD | COMMUNITY
Start: 2024-10-30 | End: 2025-10-30

## 2024-11-03 RX ORDER — MULTIVITAMIN WITH IRON
1 TABLET ORAL DAILY
Status: DISCONTINUED | OUTPATIENT
Start: 2024-11-03 | End: 2024-11-06 | Stop reason: HOSPADM

## 2024-11-03 RX ORDER — ONDANSETRON 4 MG/1
4 TABLET, ORALLY DISINTEGRATING ORAL EVERY 8 HOURS PRN
Status: DISCONTINUED | OUTPATIENT
Start: 2024-11-03 | End: 2024-11-06 | Stop reason: HOSPADM

## 2024-11-03 RX ORDER — LORAZEPAM 1 MG/1
4 TABLET ORAL
Status: DISCONTINUED | OUTPATIENT
Start: 2024-11-03 | End: 2024-11-06 | Stop reason: HOSPADM

## 2024-11-03 RX ORDER — TIOTROPIUM BROMIDE 18 UG/1
18 CAPSULE ORAL; RESPIRATORY (INHALATION) DAILY
Status: ON HOLD | COMMUNITY
Start: 2024-10-30 | End: 2025-10-30

## 2024-11-03 RX ORDER — LORAZEPAM 1 MG/1
2 TABLET ORAL
Status: DISCONTINUED | OUTPATIENT
Start: 2024-11-03 | End: 2024-11-06 | Stop reason: HOSPADM

## 2024-11-03 RX ORDER — GUAIFENESIN/DEXTROMETHORPHAN 100-10MG/5
5 SYRUP ORAL EVERY 4 HOURS PRN
Status: DISCONTINUED | OUTPATIENT
Start: 2024-11-03 | End: 2024-11-06 | Stop reason: HOSPADM

## 2024-11-03 RX ORDER — FAMOTIDINE 20 MG/1
20 TABLET, FILM COATED ORAL 2 TIMES DAILY
Status: DISCONTINUED | OUTPATIENT
Start: 2024-11-03 | End: 2024-11-06 | Stop reason: HOSPADM

## 2024-11-03 RX ORDER — LORAZEPAM 1 MG/1
3 TABLET ORAL
Status: DISCONTINUED | OUTPATIENT
Start: 2024-11-03 | End: 2024-11-06 | Stop reason: HOSPADM

## 2024-11-03 RX ORDER — IPRATROPIUM BROMIDE AND ALBUTEROL SULFATE 2.5; .5 MG/3ML; MG/3ML
1 SOLUTION RESPIRATORY (INHALATION)
Status: DISCONTINUED | OUTPATIENT
Start: 2024-11-03 | End: 2024-11-06 | Stop reason: HOSPADM

## 2024-11-03 RX ORDER — LEVOFLOXACIN 5 MG/ML
750 INJECTION, SOLUTION INTRAVENOUS
Status: COMPLETED | OUTPATIENT
Start: 2024-11-03 | End: 2024-11-03

## 2024-11-03 RX ORDER — LEVOFLOXACIN 5 MG/ML
750 INJECTION, SOLUTION INTRAVENOUS EVERY 24 HOURS
Status: DISCONTINUED | OUTPATIENT
Start: 2024-11-04 | End: 2024-11-03

## 2024-11-03 RX ADMIN — FAMOTIDINE 20 MG: 20 TABLET ORAL at 20:19

## 2024-11-03 RX ADMIN — THERA TABS 1 TABLET: TAB at 15:26

## 2024-11-03 RX ADMIN — IPRATROPIUM BROMIDE AND ALBUTEROL SULFATE 1 DOSE: .5; 3 SOLUTION RESPIRATORY (INHALATION) at 08:09

## 2024-11-03 RX ADMIN — ALBUTEROL SULFATE 5 MG: 2.5 SOLUTION RESPIRATORY (INHALATION) at 04:42

## 2024-11-03 RX ADMIN — IPRATROPIUM BROMIDE AND ALBUTEROL SULFATE 1 DOSE: .5; 3 SOLUTION RESPIRATORY (INHALATION) at 11:44

## 2024-11-03 RX ADMIN — FOLIC ACID 1 MG: 1 TABLET ORAL at 15:26

## 2024-11-03 RX ADMIN — IPRATROPIUM BROMIDE AND ALBUTEROL SULFATE 1 DOSE: .5; 3 SOLUTION RESPIRATORY (INHALATION) at 20:02

## 2024-11-03 RX ADMIN — KETOROLAC TROMETHAMINE 30 MG: 30 INJECTION, SOLUTION INTRAMUSCULAR at 04:14

## 2024-11-03 RX ADMIN — MORPHINE SULFATE 4 MG: 4 INJECTION, SOLUTION INTRAMUSCULAR; INTRAVENOUS at 04:14

## 2024-11-03 RX ADMIN — SODIUM CHLORIDE, PRESERVATIVE FREE 10 ML: 5 INJECTION INTRAVENOUS at 09:22

## 2024-11-03 RX ADMIN — SODIUM CHLORIDE, PRESERVATIVE FREE 10 ML: 5 INJECTION INTRAVENOUS at 20:19

## 2024-11-03 RX ADMIN — MAGNESIUM SULFATE HEPTAHYDRATE 2000 MG: 40 INJECTION, SOLUTION INTRAVENOUS at 04:20

## 2024-11-03 RX ADMIN — FAMOTIDINE 20 MG: 20 TABLET ORAL at 09:21

## 2024-11-03 RX ADMIN — IPRATROPIUM BROMIDE AND ALBUTEROL SULFATE 1 DOSE: .5; 3 SOLUTION RESPIRATORY (INHALATION) at 15:53

## 2024-11-03 RX ADMIN — LEVOFLOXACIN 750 MG: 5 INJECTION, SOLUTION INTRAVENOUS at 04:34

## 2024-11-03 RX ADMIN — ENOXAPARIN SODIUM 30 MG: 100 INJECTION SUBCUTANEOUS at 09:21

## 2024-11-03 RX ADMIN — WATER 125 MG: 1 INJECTION INTRAMUSCULAR; INTRAVENOUS; SUBCUTANEOUS at 04:15

## 2024-11-03 RX ADMIN — WATER 60 MG: 1 INJECTION INTRAMUSCULAR; INTRAVENOUS; SUBCUTANEOUS at 15:26

## 2024-11-03 RX ADMIN — IPRATROPIUM BROMIDE AND ALBUTEROL SULFATE 3 DOSE: .5; 3 SOLUTION RESPIRATORY (INHALATION) at 04:07

## 2024-11-03 RX ADMIN — Medication 100 MG: at 15:26

## 2024-11-03 ASSESSMENT — PAIN SCALES - GENERAL: PAINLEVEL_OUTOF10: 8

## 2024-11-03 ASSESSMENT — PAIN DESCRIPTION - DESCRIPTORS: DESCRIPTORS: ACHING

## 2024-11-03 ASSESSMENT — PAIN DESCRIPTION - LOCATION: LOCATION: GENERALIZED

## 2024-11-03 ASSESSMENT — PAIN - FUNCTIONAL ASSESSMENT: PAIN_FUNCTIONAL_ASSESSMENT: 0-10

## 2024-11-03 NOTE — ED TRIAGE NOTES
Pt brought to ED by nephew for SOB since yesterday. Pt reports hx of lung cancer (not currently receiving treatment) & COPD. Pt O2 sats 83% on room air, pt placed on NRB. MD & RT to bedside.    Pt endorses cocaine yesterday.

## 2024-11-03 NOTE — H&P
Comprehensive Metabolic Panel    Collection Time: 11/03/24  4:04 AM   Result Value Ref Range    Sodium 133 (L) 136 - 145 mmol/L    Potassium 3.7 3.5 - 5.1 mmol/L    Chloride 95 (L) 97 - 108 mmol/L    CO2 30 21 - 32 mmol/L    Anion Gap 8 2 - 12 mmol/L    Glucose 75 65 - 100 mg/dL    BUN 10 6 - 20 MG/DL    Creatinine 0.75 0.70 - 1.30 MG/DL    BUN/Creatinine Ratio 13 12 - 20      Est, Glom Filt Rate >90 >60 ml/min/1.73m2    Calcium 9.3 8.5 - 10.1 MG/DL    Total Bilirubin 0.6 0.2 - 1.0 MG/DL    ALT 18 12 - 78 U/L    AST 21 15 - 37 U/L    Alk Phosphatase 107 45 - 117 U/L    Total Protein 8.5 (H) 6.4 - 8.2 g/dL    Albumin 3.0 (L) 3.5 - 5.0 g/dL    Globulin 5.5 (H) 2.0 - 4.0 g/dL    Albumin/Globulin Ratio 0.5 (L) 1.1 - 2.2     Brain Natriuretic Peptide    Collection Time: 11/03/24  4:04 AM   Result Value Ref Range    NT Pro- (H) 0 - 125 PG/ML   Troponin    Collection Time: 11/03/24  4:04 AM   Result Value Ref Range    Troponin, High Sensitivity 11 0 - 76 ng/L   Magnesium    Collection Time: 11/03/24  4:04 AM   Result Value Ref Range    Magnesium 1.4 (L) 1.6 - 2.4 mg/dL   EKG 12 Lead    Collection Time: 11/03/24  4:07 AM   Result Value Ref Range    Ventricular Rate 93 BPM    Atrial Rate 93 BPM    P-R Interval 102 ms    QRS Duration 66 ms    Q-T Interval 336 ms    QTc Calculation (Bazett) 417 ms    P Axis 70 degrees    R Axis 74 degrees    T Axis 58 degrees    Diagnosis       Sinus rhythm with short OR  Otherwise normal ECG  When compared with ECG of 29-OCT-2024 14:05,  No significant change was found     COVID-19 & Influenza Combo    Collection Time: 11/03/24  4:09 AM    Specimen: Nasopharyngeal   Result Value Ref Range    Source Nasopharyngeal      SARS-CoV-2, PCR Not detected NOTD      Rapid Influenza A By PCR Not detected NOTD      Rapid Influenza B By PCR Not detected NOTD     Blood Gas, Venous    Collection Time: 11/03/24  4:14 AM   Result Value Ref Range    pH, Robles 7.41 7.32 - 7.42      pCO2, Robles 45.7 41 - 51

## 2024-11-03 NOTE — ED PROVIDER NOTES
[facility] interventions  CASE REVIEW - Nursing and Family  TREATMENT RESPONSE -Improved  PERFORMED BY - Self (Ajit Hudson MD)    NOTES   :  I personally spent 90 minutes of critical care time with this patient. This is time spent at this critically ill patient's bedside actively involved in patient care as well as the coordination of care and discussions with the patient's family. This includes time involved in ordering and reviewing of laboratory studies, pulse oximetry, re-evaluation of patient's condition, examination of patient, evaluation of patient's response to treatment, ordering and performing treatments and interventions, review of old charts, consultations with specialist, discussions with family regarding pertinent collateral history and plan of care, bedside attention and documentation. During this entire length of time I was immediately available to the patient. This does not include time spent on separately reported billable procedures.     Critical Care: The reason for providing this level of medical care for this critically-ill patient was due to a critical illness that impaired one or more vital organ systems, such that there was a high probability of imminent or life-threatening deterioration in the patient's condition. This care involved the highest level of preparedness to intervene urgently. This care involved high complexity decision making to assess, manipulate, and support vital system functions, to treat this degree of vital organ system failure, and to prevent further life threatening deterioration of the patient’s condition requiring frequent assessments and interventions.    Ajit Hudson MD    ADMIT  Given the patient's current clinical presentation, I have a high level of concern for decompensation if discharged from the emergency department.     Patient is being admitted to the hospital.  The results of their tests and reasons for their admission have been discussed with them and/or

## 2024-11-03 NOTE — ED NOTES
Pt reports to ED w. Complaints of SOB and cough since last night.  Pt reports HX of COPD, Lung cancer, and other ailments.  Pt poor historian on meds being taken, when asked about meds for cancer he states \"I'm not sure\"  Pt denies being on O2 at home and endorses using cocaine last night prior to feeling this way.      Pt RR labored and tachepnic   Pt chest rise and fall symmetrical   Pt tachy on arrival    Emergency Department Nursing Plan of Care      The Nursing Plan of Care is developed from the Nursing assessment and Emergency Department Attending provider initial evaluation.  The plan of care may be reviewed in the “ED Provider note”.    The Plan of Care was developed with the following considerations:  Patient / Family readiness to learn indicated by:verbalized understanding  Persons(s) to be included in education: patient  Barriers to Learning/Limitations:None    Signed    aDrian Chowdhury RN    11/3/2024   4:29 AM

## 2024-11-04 ENCOUNTER — APPOINTMENT (OUTPATIENT)
Facility: HOSPITAL | Age: 64
DRG: 139 | End: 2024-11-04
Attending: HOSPITALIST
Payer: MEDICAID

## 2024-11-04 LAB
ANION GAP SERPL CALC-SCNC: 10 MMOL/L (ref 2–12)
BUN SERPL-MCNC: 13 MG/DL (ref 6–20)
BUN/CREAT SERPL: 16 (ref 12–20)
CALCIUM SERPL-MCNC: 9.3 MG/DL (ref 8.5–10.1)
CHLORIDE SERPL-SCNC: 100 MMOL/L (ref 97–108)
CO2 SERPL-SCNC: 26 MMOL/L (ref 21–32)
CREAT SERPL-MCNC: 0.8 MG/DL (ref 0.7–1.3)
EKG ATRIAL RATE: 93 BPM
EKG DIAGNOSIS: NORMAL
EKG P AXIS: 70 DEGREES
EKG P-R INTERVAL: 102 MS
EKG Q-T INTERVAL: 336 MS
EKG QRS DURATION: 66 MS
EKG QTC CALCULATION (BAZETT): 417 MS
EKG R AXIS: 74 DEGREES
EKG T AXIS: 58 DEGREES
EKG VENTRICULAR RATE: 93 BPM
GLUCOSE SERPL-MCNC: 185 MG/DL (ref 65–100)
MAGNESIUM SERPL-MCNC: 1.6 MG/DL (ref 1.6–2.4)
POTASSIUM SERPL-SCNC: 3.2 MMOL/L (ref 3.5–5.1)
SODIUM SERPL-SCNC: 136 MMOL/L (ref 136–145)

## 2024-11-04 PROCEDURE — 6370000000 HC RX 637 (ALT 250 FOR IP): Performed by: HOSPITALIST

## 2024-11-04 PROCEDURE — 94640 AIRWAY INHALATION TREATMENT: CPT

## 2024-11-04 PROCEDURE — 6360000002 HC RX W HCPCS: Performed by: HOSPITALIST

## 2024-11-04 PROCEDURE — 93010 ELECTROCARDIOGRAM REPORT: CPT | Performed by: SPECIALIST

## 2024-11-04 PROCEDURE — 1200000000 HC SEMI PRIVATE

## 2024-11-04 PROCEDURE — 94760 N-INVAS EAR/PLS OXIMETRY 1: CPT

## 2024-11-04 PROCEDURE — 36415 COLL VENOUS BLD VENIPUNCTURE: CPT

## 2024-11-04 PROCEDURE — 83735 ASSAY OF MAGNESIUM: CPT

## 2024-11-04 PROCEDURE — 6370000000 HC RX 637 (ALT 250 FOR IP): Performed by: INTERNAL MEDICINE

## 2024-11-04 PROCEDURE — 80048 BASIC METABOLIC PNL TOTAL CA: CPT

## 2024-11-04 PROCEDURE — 6360000002 HC RX W HCPCS: Performed by: INTERNAL MEDICINE

## 2024-11-04 PROCEDURE — 2580000003 HC RX 258: Performed by: HOSPITALIST

## 2024-11-04 RX ORDER — ARFORMOTEROL TARTRATE 15 UG/2ML
15 SOLUTION RESPIRATORY (INHALATION)
Status: DISCONTINUED | OUTPATIENT
Start: 2024-11-04 | End: 2024-11-04

## 2024-11-04 RX ORDER — PREDNISONE 20 MG/1
40 TABLET ORAL DAILY
Status: DISCONTINUED | OUTPATIENT
Start: 2024-11-04 | End: 2024-11-04

## 2024-11-04 RX ORDER — HYDROCODONE BITARTRATE AND ACETAMINOPHEN 5; 325 MG/1; MG/1
1 TABLET ORAL ONCE
Status: COMPLETED | OUTPATIENT
Start: 2024-11-04 | End: 2024-11-04

## 2024-11-04 RX ORDER — PREDNISONE 20 MG/1
40 TABLET ORAL DAILY
Status: DISCONTINUED | OUTPATIENT
Start: 2024-11-04 | End: 2024-11-06 | Stop reason: HOSPADM

## 2024-11-04 RX ADMIN — WATER 60 MG: 1 INJECTION INTRAMUSCULAR; INTRAVENOUS; SUBCUTANEOUS at 04:14

## 2024-11-04 RX ADMIN — FAMOTIDINE 20 MG: 20 TABLET ORAL at 09:28

## 2024-11-04 RX ADMIN — Medication 100 MG: at 09:28

## 2024-11-04 RX ADMIN — FOLIC ACID 1 MG: 1 TABLET ORAL at 09:28

## 2024-11-04 RX ADMIN — IPRATROPIUM BROMIDE AND ALBUTEROL SULFATE 1 DOSE: .5; 3 SOLUTION RESPIRATORY (INHALATION) at 08:22

## 2024-11-04 RX ADMIN — IPRATROPIUM BROMIDE AND ALBUTEROL SULFATE 1 DOSE: .5; 3 SOLUTION RESPIRATORY (INHALATION) at 12:45

## 2024-11-04 RX ADMIN — IPRATROPIUM BROMIDE AND ALBUTEROL SULFATE 1 DOSE: .5; 3 SOLUTION RESPIRATORY (INHALATION) at 16:16

## 2024-11-04 RX ADMIN — ACETAMINOPHEN 650 MG: 325 TABLET ORAL at 04:12

## 2024-11-04 RX ADMIN — HYDROCODONE BITARTRATE AND ACETAMINOPHEN 1 TABLET: 5; 325 TABLET ORAL at 05:34

## 2024-11-04 RX ADMIN — SODIUM CHLORIDE, PRESERVATIVE FREE 10 ML: 5 INJECTION INTRAVENOUS at 20:57

## 2024-11-04 RX ADMIN — SODIUM CHLORIDE, PRESERVATIVE FREE 10 ML: 5 INJECTION INTRAVENOUS at 09:28

## 2024-11-04 RX ADMIN — IPRATROPIUM BROMIDE AND ALBUTEROL SULFATE 1 DOSE: .5; 3 SOLUTION RESPIRATORY (INHALATION) at 20:22

## 2024-11-04 RX ADMIN — PREDNISONE 40 MG: 20 TABLET ORAL at 11:01

## 2024-11-04 RX ADMIN — ARFORMOTEROL TARTRATE 15 MCG: 15 SOLUTION RESPIRATORY (INHALATION) at 12:45

## 2024-11-04 RX ADMIN — ARFORMOTEROL TARTRATE: 15 SOLUTION RESPIRATORY (INHALATION) at 20:22

## 2024-11-04 RX ADMIN — ALUMINUM HYDROXIDE, MAGNESIUM HYDROXIDE, AND SIMETHICONE 30 ML: 1200; 120; 1200 SUSPENSION ORAL at 04:13

## 2024-11-04 RX ADMIN — THERA TABS 1 TABLET: TAB at 09:28

## 2024-11-04 RX ADMIN — FAMOTIDINE 20 MG: 20 TABLET ORAL at 20:57

## 2024-11-04 RX ADMIN — POTASSIUM BICARBONATE 50 MEQ: 978 TABLET, EFFERVESCENT ORAL at 09:27

## 2024-11-04 ASSESSMENT — PAIN - FUNCTIONAL ASSESSMENT: PAIN_FUNCTIONAL_ASSESSMENT: ACTIVITIES ARE NOT PREVENTED

## 2024-11-04 ASSESSMENT — PAIN SCALES - GENERAL
PAINLEVEL_OUTOF10: 6
PAINLEVEL_OUTOF10: 8
PAINLEVEL_OUTOF10: 0

## 2024-11-04 ASSESSMENT — PAIN DESCRIPTION - DESCRIPTORS
DESCRIPTORS: BURNING;ACHING
DESCRIPTORS: BURNING

## 2024-11-04 ASSESSMENT — PAIN DESCRIPTION - ORIENTATION: ORIENTATION: ANTERIOR;LEFT;RIGHT

## 2024-11-04 ASSESSMENT — PAIN DESCRIPTION - LOCATION: LOCATION: CHEST

## 2024-11-04 NOTE — PLAN OF CARE
Problem: Discharge Planning  Goal: Discharge to home or other facility with appropriate resources  11/4/2024 1002 by Danielle Jarquin RN  Outcome: Progressing  11/3/2024 2114 by Vero Fernandez RN  Outcome: Progressing     Problem: Pain  Goal: Verbalizes/displays adequate comfort level or baseline comfort level  Outcome: Progressing     Problem: Safety - Adult  Goal: Free from fall injury  11/4/2024 1002 by Danielle Jarquin RN  Outcome: Progressing  11/3/2024 2114 by Vero Fernandez RN  Outcome: Progressing

## 2024-11-05 ENCOUNTER — APPOINTMENT (OUTPATIENT)
Facility: HOSPITAL | Age: 64
DRG: 139 | End: 2024-11-05
Payer: MEDICAID

## 2024-11-05 LAB
ALBUMIN SERPL-MCNC: 2.3 G/DL (ref 3.5–5)
ALBUMIN/GLOB SERPL: 0.5 (ref 1.1–2.2)
ALP SERPL-CCNC: 93 U/L (ref 45–117)
ALT SERPL-CCNC: 17 U/L (ref 12–78)
ANION GAP SERPL CALC-SCNC: 8 MMOL/L (ref 2–12)
AST SERPL-CCNC: 17 U/L (ref 15–37)
BASOPHILS # BLD: 0 K/UL (ref 0–0.1)
BASOPHILS NFR BLD: 0 % (ref 0–1)
BILIRUB SERPL-MCNC: 0.3 MG/DL (ref 0.2–1)
BUN SERPL-MCNC: 15 MG/DL (ref 6–20)
BUN/CREAT SERPL: 22 (ref 12–20)
CALCIUM SERPL-MCNC: 9.3 MG/DL (ref 8.5–10.1)
CHLORIDE SERPL-SCNC: 102 MMOL/L (ref 97–108)
CO2 SERPL-SCNC: 27 MMOL/L (ref 21–32)
CREAT SERPL-MCNC: 0.69 MG/DL (ref 0.7–1.3)
DIFFERENTIAL METHOD BLD: ABNORMAL
EOSINOPHIL # BLD: 0 K/UL (ref 0–0.4)
EOSINOPHIL NFR BLD: 0 % (ref 0–7)
ERYTHROCYTE [DISTWIDTH] IN BLOOD BY AUTOMATED COUNT: 13.2 % (ref 11.5–14.5)
GLOBULIN SER CALC-MCNC: 5.1 G/DL (ref 2–4)
GLUCOSE SERPL-MCNC: 155 MG/DL (ref 65–100)
HCT VFR BLD AUTO: 39.2 % (ref 36.6–50.3)
HGB BLD-MCNC: 13.2 G/DL (ref 12.1–17)
IMM GRANULOCYTES # BLD AUTO: 0.3 K/UL (ref 0–0.04)
IMM GRANULOCYTES NFR BLD AUTO: 1 % (ref 0–0.5)
LYMPHOCYTES # BLD: 2.9 K/UL (ref 0.8–3.5)
LYMPHOCYTES NFR BLD: 11 % (ref 12–49)
MAGNESIUM SERPL-MCNC: 1.4 MG/DL (ref 1.6–2.4)
MCH RBC QN AUTO: 31.8 PG (ref 26–34)
MCHC RBC AUTO-ENTMCNC: 33.7 G/DL (ref 30–36.5)
MCV RBC AUTO: 94.5 FL (ref 80–99)
MONOCYTES # BLD: 2.4 K/UL (ref 0–1)
MONOCYTES NFR BLD: 9 % (ref 5–13)
NEUTS SEG # BLD: 20.6 K/UL (ref 1.8–8)
NEUTS SEG NFR BLD: 79 % (ref 32–75)
NRBC # BLD: 0 K/UL (ref 0–0.01)
NRBC BLD-RTO: 0 PER 100 WBC
PHOSPHATE SERPL-MCNC: 2.2 MG/DL (ref 2.6–4.7)
PLATELET # BLD AUTO: 335 K/UL (ref 150–400)
PMV BLD AUTO: 8.5 FL (ref 8.9–12.9)
POTASSIUM SERPL-SCNC: 3.2 MMOL/L (ref 3.5–5.1)
PROT SERPL-MCNC: 7.4 G/DL (ref 6.4–8.2)
RBC # BLD AUTO: 4.15 M/UL (ref 4.1–5.7)
RBC MORPH BLD: ABNORMAL
SODIUM SERPL-SCNC: 137 MMOL/L (ref 136–145)
WBC # BLD AUTO: 19 K/UL (ref 4.1–11.1)
WBC # BLD AUTO: 26.2 K/UL (ref 4.1–11.1)

## 2024-11-05 PROCEDURE — 6360000002 HC RX W HCPCS: Performed by: INTERNAL MEDICINE

## 2024-11-05 PROCEDURE — 2580000003 HC RX 258: Performed by: HOSPITALIST

## 2024-11-05 PROCEDURE — 6370000000 HC RX 637 (ALT 250 FOR IP): Performed by: INTERNAL MEDICINE

## 2024-11-05 PROCEDURE — 80053 COMPREHEN METABOLIC PANEL: CPT

## 2024-11-05 PROCEDURE — 6360000002 HC RX W HCPCS: Performed by: HOSPITALIST

## 2024-11-05 PROCEDURE — 84100 ASSAY OF PHOSPHORUS: CPT

## 2024-11-05 PROCEDURE — 94760 N-INVAS EAR/PLS OXIMETRY 1: CPT

## 2024-11-05 PROCEDURE — 94640 AIRWAY INHALATION TREATMENT: CPT

## 2024-11-05 PROCEDURE — 36415 COLL VENOUS BLD VENIPUNCTURE: CPT

## 2024-11-05 PROCEDURE — 83735 ASSAY OF MAGNESIUM: CPT

## 2024-11-05 PROCEDURE — 85048 AUTOMATED LEUKOCYTE COUNT: CPT

## 2024-11-05 PROCEDURE — 71275 CT ANGIOGRAPHY CHEST: CPT

## 2024-11-05 PROCEDURE — 6370000000 HC RX 637 (ALT 250 FOR IP): Performed by: HOSPITALIST

## 2024-11-05 PROCEDURE — 6360000004 HC RX CONTRAST MEDICATION: Performed by: INTERNAL MEDICINE

## 2024-11-05 PROCEDURE — 2580000003 HC RX 258: Performed by: INTERNAL MEDICINE

## 2024-11-05 PROCEDURE — 85025 COMPLETE CBC W/AUTO DIFF WBC: CPT

## 2024-11-05 PROCEDURE — 1200000000 HC SEMI PRIVATE

## 2024-11-05 RX ORDER — MAGNESIUM SULFATE IN WATER 40 MG/ML
2000 INJECTION, SOLUTION INTRAVENOUS PRN
Status: DISCONTINUED | OUTPATIENT
Start: 2024-11-05 | End: 2024-11-06 | Stop reason: HOSPADM

## 2024-11-05 RX ORDER — TAMSULOSIN HYDROCHLORIDE 0.4 MG/1
0.4 CAPSULE ORAL DAILY
Qty: 30 CAPSULE | Refills: 0 | OUTPATIENT
Start: 2024-11-05 | End: 2025-11-05

## 2024-11-05 RX ORDER — ASPIRIN 81 MG/1
81 TABLET ORAL DAILY
Qty: 30 TABLET | Refills: 0 | OUTPATIENT
Start: 2024-11-05

## 2024-11-05 RX ORDER — AZITHROMYCIN 500 MG/1
500 TABLET, FILM COATED ORAL DAILY
Qty: 2 TABLET | Refills: 0 | OUTPATIENT
Start: 2024-11-06 | End: 2024-11-08

## 2024-11-05 RX ORDER — IPRATROPIUM BROMIDE AND ALBUTEROL SULFATE 2.5; .5 MG/3ML; MG/3ML
1 SOLUTION RESPIRATORY (INHALATION) EVERY 4 HOURS PRN
Qty: 360 ML | Refills: 0 | OUTPATIENT
Start: 2024-11-05 | End: 2025-11-05

## 2024-11-05 RX ORDER — FLUTICASONE PROPIONATE AND SALMETEROL 250; 50 UG/1; UG/1
1 POWDER RESPIRATORY (INHALATION) 2 TIMES DAILY
Qty: 60 EACH | Refills: 0 | OUTPATIENT
Start: 2024-11-05 | End: 2025-11-05

## 2024-11-05 RX ORDER — ROSUVASTATIN CALCIUM 20 MG/1
20 TABLET, COATED ORAL DAILY
Qty: 30 TABLET | Refills: 0 | OUTPATIENT
Start: 2024-11-05 | End: 2025-05-04

## 2024-11-05 RX ORDER — POTASSIUM CHLORIDE 750 MG/1
40 TABLET, EXTENDED RELEASE ORAL ONCE
Status: COMPLETED | OUTPATIENT
Start: 2024-11-05 | End: 2024-11-05

## 2024-11-05 RX ORDER — ALBUTEROL SULFATE 90 UG/1
2 INHALANT RESPIRATORY (INHALATION) 4 TIMES DAILY PRN
Qty: 18 G | Refills: 0 | OUTPATIENT
Start: 2024-11-05

## 2024-11-05 RX ORDER — FOLIC ACID 1 MG/1
1 TABLET ORAL DAILY
Qty: 30 TABLET | Refills: 0 | OUTPATIENT
Start: 2024-11-06

## 2024-11-05 RX ORDER — TIOTROPIUM BROMIDE 18 UG/1
18 CAPSULE ORAL; RESPIRATORY (INHALATION) DAILY
Qty: 30 CAPSULE | Refills: 0 | OUTPATIENT
Start: 2024-11-05 | End: 2025-11-05

## 2024-11-05 RX ORDER — GUAIFENESIN/DEXTROMETHORPHAN 100-10MG/5
5 SYRUP ORAL EVERY 4 HOURS PRN
Qty: 120 ML | Refills: 0 | OUTPATIENT
Start: 2024-11-05 | End: 2024-11-15

## 2024-11-05 RX ORDER — AZITHROMYCIN 500 MG/1
500 TABLET, FILM COATED ORAL DAILY
Status: DISCONTINUED | OUTPATIENT
Start: 2024-11-05 | End: 2024-11-06 | Stop reason: HOSPADM

## 2024-11-05 RX ORDER — MULTIVITAMIN WITH IRON
1 TABLET ORAL DAILY
Qty: 30 TABLET | Refills: 0 | OUTPATIENT
Start: 2024-11-06

## 2024-11-05 RX ORDER — LANOLIN ALCOHOL/MO/W.PET/CERES
100 CREAM (GRAM) TOPICAL DAILY
Qty: 30 TABLET | Refills: 0 | OUTPATIENT
Start: 2024-11-06

## 2024-11-05 RX ORDER — IOPAMIDOL 755 MG/ML
100 INJECTION, SOLUTION INTRAVASCULAR
Status: COMPLETED | OUTPATIENT
Start: 2024-11-05 | End: 2024-11-05

## 2024-11-05 RX ADMIN — ENOXAPARIN SODIUM 30 MG: 100 INJECTION SUBCUTANEOUS at 10:06

## 2024-11-05 RX ADMIN — FAMOTIDINE 20 MG: 20 TABLET ORAL at 21:24

## 2024-11-05 RX ADMIN — IPRATROPIUM BROMIDE AND ALBUTEROL SULFATE 1 DOSE: .5; 3 SOLUTION RESPIRATORY (INHALATION) at 08:29

## 2024-11-05 RX ADMIN — IOPAMIDOL 100 ML: 755 INJECTION, SOLUTION INTRAVENOUS at 18:35

## 2024-11-05 RX ADMIN — SODIUM CHLORIDE, PRESERVATIVE FREE 10 ML: 5 INJECTION INTRAVENOUS at 10:06

## 2024-11-05 RX ADMIN — FAMOTIDINE 20 MG: 20 TABLET ORAL at 10:06

## 2024-11-05 RX ADMIN — ALBUTEROL SULFATE 2.5 MG: 2.5 SOLUTION RESPIRATORY (INHALATION) at 00:56

## 2024-11-05 RX ADMIN — PREDNISONE 40 MG: 20 TABLET ORAL at 10:06

## 2024-11-05 RX ADMIN — MAGNESIUM SULFATE HEPTAHYDRATE 2000 MG: 40 INJECTION, SOLUTION INTRAVENOUS at 12:05

## 2024-11-05 RX ADMIN — THERA TABS 1 TABLET: TAB at 10:05

## 2024-11-05 RX ADMIN — DEXTROMETHORPHAN HYDROBROMIDE, GUAIFENESIN 5 ML: 10; 100 LIQUID ORAL at 10:05

## 2024-11-05 RX ADMIN — AZITHROMYCIN 500 MG: 500 TABLET, FILM COATED ORAL at 13:10

## 2024-11-05 RX ADMIN — FOLIC ACID 1 MG: 1 TABLET ORAL at 10:05

## 2024-11-05 RX ADMIN — CEFTRIAXONE 1000 MG: 1 INJECTION, POWDER, FOR SOLUTION INTRAMUSCULAR; INTRAVENOUS at 21:31

## 2024-11-05 RX ADMIN — POTASSIUM CHLORIDE 40 MEQ: 750 TABLET, EXTENDED RELEASE ORAL at 10:05

## 2024-11-05 RX ADMIN — SODIUM CHLORIDE, PRESERVATIVE FREE 10 ML: 5 INJECTION INTRAVENOUS at 21:24

## 2024-11-05 RX ADMIN — Medication 100 MG: at 10:05

## 2024-11-05 RX ADMIN — IPRATROPIUM BROMIDE AND ALBUTEROL SULFATE 1 DOSE: .5; 3 SOLUTION RESPIRATORY (INHALATION) at 19:49

## 2024-11-05 RX ADMIN — ARFORMOTEROL TARTRATE: 15 SOLUTION RESPIRATORY (INHALATION) at 08:28

## 2024-11-05 RX ADMIN — ARFORMOTEROL TARTRATE: 15 SOLUTION RESPIRATORY (INHALATION) at 19:49

## 2024-11-05 ASSESSMENT — PAIN DESCRIPTION - LOCATION: LOCATION: CHEST;BACK

## 2024-11-05 ASSESSMENT — PAIN DESCRIPTION - ORIENTATION: ORIENTATION: RIGHT;LEFT;MID

## 2024-11-05 ASSESSMENT — PAIN SCALES - GENERAL: PAINLEVEL_OUTOF10: 8

## 2024-11-05 NOTE — DISCHARGE INSTRUCTIONS
It is important that you take the medication exactly as they are prescribed.   Keep your medication in the bottles provided by the pharmacist and keep a list of the medication names, dosages, and times to be taken in your wallet.   Do not take other medications without consulting your doctor.       NOTIFY YOUR PHYSICIAN OR GO TO THE NEAREST EMERGENCY ROOM FOR ANY OF THE FOLLOWING:   Fever over 101 degrees for 24 hours.   Chest pain, shortness of breath, fever, chills, nausea, vomiting, diarrhea, change in mentation, falling, weakness, bleeding. Severe pain or pain not relieved by medications.  Or, any other signs or symptoms that you may have questions about.      Note: You were admitted to hospital with shortness of breath.  You need to follow-up with primary care physician for continued healthcare management/screenings.

## 2024-11-05 NOTE — CARE COORDINATION
Late entry from assessment done 11/4/2024    Care Management Initial Assessment         RUR:10%  Readmission? No  1st IM letter given? No         N/A  1st  letter given: No  N/A    Patient admitted with COPD exacerbation  CM spoke with patient to introduce self and explain role as well as complete initial assessment.     PATIENT VERIFIED THE FOLLOWING    FORMER ADDRESS  [address] [facility] [address]      CURRENT [facility] ADDRESS {PATIENT DID NOT REVEAL ROOM NUMBER}  [address] [facility] [place] [address] [phone]      [id number] [place] [id number] - P [phone] - F [phone]     TRANSPORTATION AT DISCHARGE  FAMILY       11/04/24 1231   Service Assessment   Patient Orientation Alert and Oriented;Person;Place;Situation;Self   Cognition Alert   History Provided By Patient   Primary Caregiver Self   Accompanied By/Relationship N/A   ANTONIO GROSSMAN RD   Support Systems Family Members   Patient's Healthcare Decision Maker is: Patient Declined (Legal Next of Kin Remains as Decision Maker)   PCP Verified by CM Yes   Last Visit to PCP Within last 3 months   Prior Functional Level Independent in ADLs/IADLs   Current Functional Level Independent in ADLs/IADLs   Can patient return to prior living arrangement Yes   Ability to make needs known: Good   Family able to assist with home care needs: No   Would you like for me to discuss the discharge plan with any other family members/significant others, and if so, who? No   Financial Resources Medicaid;[employer] [place]  (disability)   CM/SW Referral Difficulty coping/adjusting to illness  (substance disorder referrals left at bedside)   Discharge Planning   Type of Residence Other (Comment)  ([facility])   Current Services Prior To Admission Durable Medical Equipment   Current DME Prior to Arrival Home Aerosol  (patient had home aerosol but the equipment was no longer working and he stated he

## 2024-11-05 NOTE — CARE COORDINATION
Late entry from assessment done 11/4/2024    Care Management Initial Assessment         RUR:10%  Readmission? No  1st IM letter given? No         N/A  1st  letter given: No  N/A    Patient admitted with COPD exacerbation  CM spoke with patient to introduce self and explain role as well as complete initial assessment.          11/04/24 1236   Service Assessment   Patient Orientation Alert and Oriented;Person;Place;Situation;Self   Cognition Alert   History Provided By Patient   Primary Caregiver Self   Accompanied By/Relationship N/A   ANTONIO GROSSMAN    Support Systems Family Members   Patient's Healthcare Decision Maker is: Patient Declined (Legal Next of Kin Remains as Decision Maker)   PCP Verified by CM Yes   Last Visit to PCP Within last 3 months   Prior Functional Level Independent in ADLs/IADLs   Current Functional Level Independent in ADLs/IADLs   Can patient return to prior living arrangement Yes   Ability to make needs known: Good   Family able to assist with home care needs: No   Would you like for me to discuss the discharge plan with any other family members/significant others, and if so, who? No   Financial Resources Medicaid;Food [place]  (disability)   CM/NISA Referral Difficulty coping/adjusting to illness  (substance disorder referrals left at bedside)   Discharge Planning   Type of Residence Other (Comment)  ([facility])   Current Services Prior To Admission Durable Medical Equipment   Current DME Prior to Arrival Home Aerosol  (patient had home aerosol but the equipment was no longer working and he stated he needs a new one. CM informed provider)   Potential Assistance Purchasing Medications No   Services At/After Discharge    Resource Information Provided? No   Mode of Transport at Discharge   (family)   Confirm Follow Up Transport Family   Condition of Participation: Discharge Planning   The Plan for Transition of Care is related to the following treatment goals: PCP,Pulmonology

## 2024-11-06 ENCOUNTER — HOSPITAL ENCOUNTER (INPATIENT)
Facility: HOSPITAL | Age: 64
LOS: 10 days | Discharge: HOME | End: 2024-11-16
Attending: INTERNAL MEDICINE | Admitting: INTERNAL MEDICINE
Payer: MEDICAID

## 2024-11-06 ENCOUNTER — APPOINTMENT (OUTPATIENT)
Facility: HOSPITAL | Age: 64
End: 2024-11-06
Attending: INTERNAL MEDICINE
Payer: MEDICAID

## 2024-11-06 VITALS
HEART RATE: 95 BPM | DIASTOLIC BLOOD PRESSURE: 80 MMHG | BODY MASS INDEX: 17.24 KG/M2 | HEIGHT: 66 IN | RESPIRATION RATE: 18 BRPM | WEIGHT: 107.3 LBS | SYSTOLIC BLOOD PRESSURE: 118 MMHG | OXYGEN SATURATION: 91 % | TEMPERATURE: 98.2 F

## 2024-11-06 PROBLEM — J90 PLEURAL EFFUSION ON RIGHT: Status: ACTIVE | Noted: 2024-11-06

## 2024-11-06 PROBLEM — J18.9 PNEUMONIA DUE TO ORGANISM: Status: ACTIVE | Noted: 2024-11-06

## 2024-11-06 LAB
ANION GAP SERPL CALC-SCNC: 9 MMOL/L (ref 2–12)
BASOPHILS # BLD: 0 K/UL (ref 0–0.1)
BASOPHILS NFR BLD: 0 % (ref 0–1)
BUN SERPL-MCNC: 12 MG/DL (ref 6–20)
BUN/CREAT SERPL: 21 (ref 12–20)
CALCIUM SERPL-MCNC: 8.7 MG/DL (ref 8.5–10.1)
CHLORIDE SERPL-SCNC: 102 MMOL/L (ref 97–108)
CO2 SERPL-SCNC: 23 MMOL/L (ref 21–32)
CREAT SERPL-MCNC: 0.58 MG/DL (ref 0.7–1.3)
DIFFERENTIAL METHOD BLD: ABNORMAL
EOSINOPHIL # BLD: 0 K/UL (ref 0–0.4)
EOSINOPHIL NFR BLD: 0 % (ref 0–7)
ERYTHROCYTE [DISTWIDTH] IN BLOOD BY AUTOMATED COUNT: 13.2 % (ref 11.5–14.5)
GLUCOSE SERPL-MCNC: 197 MG/DL (ref 65–100)
HCT VFR BLD AUTO: 40 % (ref 36.6–50.3)
HGB BLD-MCNC: 13.9 G/DL (ref 12.1–17)
IMM GRANULOCYTES # BLD AUTO: 0.3 K/UL (ref 0–0.04)
IMM GRANULOCYTES NFR BLD AUTO: 2 % (ref 0–0.5)
LYMPHOCYTES # BLD: 1.4 K/UL (ref 0.8–3.5)
LYMPHOCYTES NFR BLD: 8 % (ref 12–49)
MAGNESIUM SERPL-MCNC: 1.5 MG/DL (ref 1.6–2.4)
MCH RBC QN AUTO: 31.9 PG (ref 26–34)
MCHC RBC AUTO-ENTMCNC: 34.8 G/DL (ref 30–36.5)
MCV RBC AUTO: 91.7 FL (ref 80–99)
MONOCYTES # BLD: 1.4 K/UL (ref 0–1)
MONOCYTES NFR BLD: 8 % (ref 5–13)
NEUTS SEG # BLD: 14.9 K/UL (ref 1.8–8)
NEUTS SEG NFR BLD: 82 % (ref 32–75)
NRBC # BLD: 0 K/UL (ref 0–0.01)
NRBC BLD-RTO: 0 PER 100 WBC
PLATELET # BLD AUTO: 299 K/UL (ref 150–400)
PMV BLD AUTO: 8.5 FL (ref 8.9–12.9)
POTASSIUM SERPL-SCNC: 3.6 MMOL/L (ref 3.5–5.1)
RBC # BLD AUTO: 4.36 M/UL (ref 4.1–5.7)
SODIUM SERPL-SCNC: 134 MMOL/L (ref 136–145)
WBC # BLD AUTO: 18 K/UL (ref 4.1–11.1)

## 2024-11-06 PROCEDURE — 6360000002 HC RX W HCPCS: Performed by: HOSPITALIST

## 2024-11-06 PROCEDURE — 2580000003 HC RX 258: Performed by: HOSPITALIST

## 2024-11-06 PROCEDURE — 94640 AIRWAY INHALATION TREATMENT: CPT

## 2024-11-06 PROCEDURE — 1100000000 HC RM PRIVATE

## 2024-11-06 PROCEDURE — 6370000000 HC RX 637 (ALT 250 FOR IP): Performed by: INTERNAL MEDICINE

## 2024-11-06 PROCEDURE — 83735 ASSAY OF MAGNESIUM: CPT

## 2024-11-06 PROCEDURE — 6360000002 HC RX W HCPCS: Performed by: INTERNAL MEDICINE

## 2024-11-06 PROCEDURE — 71045 X-RAY EXAM CHEST 1 VIEW: CPT

## 2024-11-06 PROCEDURE — 85025 COMPLETE CBC W/AUTO DIFF WBC: CPT

## 2024-11-06 PROCEDURE — 2580000003 HC RX 258: Performed by: INTERNAL MEDICINE

## 2024-11-06 PROCEDURE — 80048 BASIC METABOLIC PNL TOTAL CA: CPT

## 2024-11-06 PROCEDURE — 36415 COLL VENOUS BLD VENIPUNCTURE: CPT

## 2024-11-06 PROCEDURE — 6370000000 HC RX 637 (ALT 250 FOR IP): Performed by: HOSPITALIST

## 2024-11-06 RX ORDER — POLYETHYLENE GLYCOL 3350 17 G/17G
17 POWDER, FOR SOLUTION ORAL DAILY PRN
Status: DISCONTINUED | OUTPATIENT
Start: 2024-11-06 | End: 2024-11-16 | Stop reason: HOSPADM

## 2024-11-06 RX ORDER — POLYETHYLENE GLYCOL 3350 17 G/17G
17 POWDER, FOR SOLUTION ORAL DAILY PRN
Status: CANCELLED | OUTPATIENT
Start: 2024-11-06

## 2024-11-06 RX ORDER — ACETAMINOPHEN 325 MG/1
650 TABLET ORAL EVERY 6 HOURS PRN
Status: CANCELLED | OUTPATIENT
Start: 2024-11-06

## 2024-11-06 RX ORDER — LORAZEPAM 2 MG/ML
1 INJECTION INTRAMUSCULAR
Status: DISCONTINUED | OUTPATIENT
Start: 2024-11-06 | End: 2024-11-13

## 2024-11-06 RX ORDER — LORAZEPAM 1 MG/1
3 TABLET ORAL
Status: CANCELLED | OUTPATIENT
Start: 2024-11-06

## 2024-11-06 RX ORDER — TAMSULOSIN HYDROCHLORIDE 0.4 MG/1
0.4 CAPSULE ORAL DAILY
Status: DISCONTINUED | OUTPATIENT
Start: 2024-11-07 | End: 2024-11-16 | Stop reason: HOSPADM

## 2024-11-06 RX ORDER — LORAZEPAM 2 MG/ML
4 INJECTION INTRAMUSCULAR
Status: CANCELLED | OUTPATIENT
Start: 2024-11-06

## 2024-11-06 RX ORDER — GUAIFENESIN/DEXTROMETHORPHAN 100-10MG/5
5 SYRUP ORAL EVERY 4 HOURS PRN
Status: CANCELLED | OUTPATIENT
Start: 2024-11-06

## 2024-11-06 RX ORDER — MAGNESIUM HYDROXIDE/ALUMINUM HYDROXICE/SIMETHICONE 120; 1200; 1200 MG/30ML; MG/30ML; MG/30ML
30 SUSPENSION ORAL EVERY 6 HOURS PRN
Status: CANCELLED | OUTPATIENT
Start: 2024-11-06

## 2024-11-06 RX ORDER — LORAZEPAM 2 MG/ML
2 INJECTION INTRAMUSCULAR
Status: CANCELLED | OUTPATIENT
Start: 2024-11-06

## 2024-11-06 RX ORDER — LORAZEPAM 2 MG/ML
3 INJECTION INTRAMUSCULAR
Status: CANCELLED | OUTPATIENT
Start: 2024-11-06

## 2024-11-06 RX ORDER — ONDANSETRON 4 MG/1
4 TABLET, ORALLY DISINTEGRATING ORAL EVERY 8 HOURS PRN
Status: CANCELLED | OUTPATIENT
Start: 2024-11-06

## 2024-11-06 RX ORDER — ACETAMINOPHEN 650 MG/1
650 SUPPOSITORY RECTAL EVERY 6 HOURS PRN
Status: CANCELLED | OUTPATIENT
Start: 2024-11-06

## 2024-11-06 RX ORDER — FAMOTIDINE 20 MG/1
20 TABLET, FILM COATED ORAL 2 TIMES DAILY
Status: DISCONTINUED | OUTPATIENT
Start: 2024-11-06 | End: 2024-11-16 | Stop reason: HOSPADM

## 2024-11-06 RX ORDER — LORAZEPAM 1 MG/1
3 TABLET ORAL
Status: DISCONTINUED | OUTPATIENT
Start: 2024-11-06 | End: 2024-11-13

## 2024-11-06 RX ORDER — MAGNESIUM SULFATE IN WATER 40 MG/ML
2000 INJECTION, SOLUTION INTRAVENOUS PRN
Status: CANCELLED | OUTPATIENT
Start: 2024-11-06

## 2024-11-06 RX ORDER — LORAZEPAM 1 MG/1
2 TABLET ORAL
Status: DISCONTINUED | OUTPATIENT
Start: 2024-11-06 | End: 2024-11-13

## 2024-11-06 RX ORDER — LORAZEPAM 2 MG/ML
1 INJECTION INTRAMUSCULAR
Status: CANCELLED | OUTPATIENT
Start: 2024-11-06

## 2024-11-06 RX ORDER — MULTIVITAMIN WITH IRON
1 TABLET ORAL DAILY
Status: DISCONTINUED | OUTPATIENT
Start: 2024-11-07 | End: 2024-11-16 | Stop reason: HOSPADM

## 2024-11-06 RX ORDER — LORAZEPAM 2 MG/ML
3 INJECTION INTRAMUSCULAR
Status: DISCONTINUED | OUTPATIENT
Start: 2024-11-06 | End: 2024-11-13

## 2024-11-06 RX ORDER — ENOXAPARIN SODIUM 100 MG/ML
30 INJECTION SUBCUTANEOUS DAILY
Status: DISCONTINUED | OUTPATIENT
Start: 2024-11-07 | End: 2024-11-16 | Stop reason: HOSPADM

## 2024-11-06 RX ORDER — SODIUM CHLORIDE 0.9 % (FLUSH) 0.9 %
5-40 SYRINGE (ML) INJECTION EVERY 12 HOURS SCHEDULED
Status: DISCONTINUED | OUTPATIENT
Start: 2024-11-06 | End: 2024-11-16 | Stop reason: HOSPADM

## 2024-11-06 RX ORDER — AZITHROMYCIN 500 MG/1
500 TABLET, FILM COATED ORAL DAILY
Status: CANCELLED | OUTPATIENT
Start: 2024-11-07 | End: 2024-11-08

## 2024-11-06 RX ORDER — FAMOTIDINE 20 MG/1
20 TABLET, FILM COATED ORAL 2 TIMES DAILY
Status: CANCELLED | OUTPATIENT
Start: 2024-11-06

## 2024-11-06 RX ORDER — ONDANSETRON 4 MG/1
4 TABLET, ORALLY DISINTEGRATING ORAL EVERY 8 HOURS PRN
Status: DISCONTINUED | OUTPATIENT
Start: 2024-11-06 | End: 2024-11-16 | Stop reason: HOSPADM

## 2024-11-06 RX ORDER — FOLIC ACID 1 MG/1
1 TABLET ORAL DAILY
Status: DISCONTINUED | OUTPATIENT
Start: 2024-11-07 | End: 2024-11-16 | Stop reason: HOSPADM

## 2024-11-06 RX ORDER — GUAIFENESIN/DEXTROMETHORPHAN 100-10MG/5
5 SYRUP ORAL EVERY 4 HOURS PRN
Status: DISCONTINUED | OUTPATIENT
Start: 2024-11-06 | End: 2024-11-16 | Stop reason: HOSPADM

## 2024-11-06 RX ORDER — FOLIC ACID 1 MG/1
1 TABLET ORAL DAILY
Status: CANCELLED | OUTPATIENT
Start: 2024-11-07

## 2024-11-06 RX ORDER — IPRATROPIUM BROMIDE AND ALBUTEROL SULFATE 2.5; .5 MG/3ML; MG/3ML
1 SOLUTION RESPIRATORY (INHALATION)
Status: DISCONTINUED | OUTPATIENT
Start: 2024-11-06 | End: 2024-11-12

## 2024-11-06 RX ORDER — ACETAMINOPHEN 650 MG/1
650 SUPPOSITORY RECTAL EVERY 6 HOURS PRN
Status: DISCONTINUED | OUTPATIENT
Start: 2024-11-06 | End: 2024-11-16 | Stop reason: HOSPADM

## 2024-11-06 RX ORDER — LORAZEPAM 1 MG/1
4 TABLET ORAL
Status: DISCONTINUED | OUTPATIENT
Start: 2024-11-06 | End: 2024-11-13

## 2024-11-06 RX ORDER — ALBUTEROL SULFATE 0.83 MG/ML
2.5 SOLUTION RESPIRATORY (INHALATION) EVERY 6 HOURS PRN
Status: CANCELLED | OUTPATIENT
Start: 2024-11-06

## 2024-11-06 RX ORDER — SODIUM CHLORIDE 9 MG/ML
INJECTION, SOLUTION INTRAVENOUS PRN
Status: CANCELLED | OUTPATIENT
Start: 2024-11-06

## 2024-11-06 RX ORDER — LORAZEPAM 1 MG/1
1 TABLET ORAL
Status: CANCELLED | OUTPATIENT
Start: 2024-11-06

## 2024-11-06 RX ORDER — LORAZEPAM 2 MG/ML
2 INJECTION INTRAMUSCULAR
Status: DISCONTINUED | OUTPATIENT
Start: 2024-11-06 | End: 2024-11-13

## 2024-11-06 RX ORDER — MAGNESIUM SULFATE IN WATER 40 MG/ML
2000 INJECTION, SOLUTION INTRAVENOUS PRN
Status: DISCONTINUED | OUTPATIENT
Start: 2024-11-06 | End: 2024-11-16 | Stop reason: HOSPADM

## 2024-11-06 RX ORDER — LORAZEPAM 1 MG/1
1 TABLET ORAL
Status: DISCONTINUED | OUTPATIENT
Start: 2024-11-06 | End: 2024-11-13

## 2024-11-06 RX ORDER — MULTIVITAMIN WITH IRON
1 TABLET ORAL DAILY
Status: CANCELLED | OUTPATIENT
Start: 2024-11-07

## 2024-11-06 RX ORDER — ALBUTEROL SULFATE 0.83 MG/ML
2.5 SOLUTION RESPIRATORY (INHALATION) EVERY 6 HOURS PRN
Status: DISCONTINUED | OUTPATIENT
Start: 2024-11-06 | End: 2024-11-16 | Stop reason: HOSPADM

## 2024-11-06 RX ORDER — SODIUM CHLORIDE 0.9 % (FLUSH) 0.9 %
5-40 SYRINGE (ML) INJECTION PRN
Status: CANCELLED | OUTPATIENT
Start: 2024-11-06

## 2024-11-06 RX ORDER — LORAZEPAM 1 MG/1
4 TABLET ORAL
Status: CANCELLED | OUTPATIENT
Start: 2024-11-06

## 2024-11-06 RX ORDER — PREDNISONE 20 MG/1
40 TABLET ORAL DAILY
Status: COMPLETED | OUTPATIENT
Start: 2024-11-07 | End: 2024-11-07

## 2024-11-06 RX ORDER — ROSUVASTATIN CALCIUM 20 MG/1
20 TABLET, COATED ORAL NIGHTLY
Status: DISCONTINUED | OUTPATIENT
Start: 2024-11-06 | End: 2024-11-16 | Stop reason: HOSPADM

## 2024-11-06 RX ORDER — SODIUM CHLORIDE 0.9 % (FLUSH) 0.9 %
5-40 SYRINGE (ML) INJECTION PRN
Status: DISCONTINUED | OUTPATIENT
Start: 2024-11-06 | End: 2024-11-16 | Stop reason: HOSPADM

## 2024-11-06 RX ORDER — SODIUM CHLORIDE 0.9 % (FLUSH) 0.9 %
5-40 SYRINGE (ML) INJECTION EVERY 12 HOURS SCHEDULED
Status: CANCELLED | OUTPATIENT
Start: 2024-11-06

## 2024-11-06 RX ORDER — SODIUM CHLORIDE 9 MG/ML
INJECTION, SOLUTION INTRAVENOUS PRN
Status: DISCONTINUED | OUTPATIENT
Start: 2024-11-06 | End: 2024-11-16 | Stop reason: HOSPADM

## 2024-11-06 RX ORDER — ACETAMINOPHEN 325 MG/1
650 TABLET ORAL EVERY 6 HOURS PRN
Status: DISCONTINUED | OUTPATIENT
Start: 2024-11-06 | End: 2024-11-16 | Stop reason: HOSPADM

## 2024-11-06 RX ORDER — MAGNESIUM HYDROXIDE/ALUMINUM HYDROXICE/SIMETHICONE 120; 1200; 1200 MG/30ML; MG/30ML; MG/30ML
30 SUSPENSION ORAL EVERY 6 HOURS PRN
Status: DISCONTINUED | OUTPATIENT
Start: 2024-11-06 | End: 2024-11-16 | Stop reason: HOSPADM

## 2024-11-06 RX ORDER — LORAZEPAM 2 MG/ML
4 INJECTION INTRAMUSCULAR
Status: DISCONTINUED | OUTPATIENT
Start: 2024-11-06 | End: 2024-11-13

## 2024-11-06 RX ORDER — GAUZE BANDAGE 2" X 2"
100 BANDAGE TOPICAL DAILY
Status: DISCONTINUED | OUTPATIENT
Start: 2024-11-07 | End: 2024-11-16 | Stop reason: HOSPADM

## 2024-11-06 RX ORDER — ONDANSETRON 2 MG/ML
4 INJECTION INTRAMUSCULAR; INTRAVENOUS EVERY 6 HOURS PRN
Status: CANCELLED | OUTPATIENT
Start: 2024-11-06

## 2024-11-06 RX ORDER — AZITHROMYCIN 250 MG/1
500 TABLET, FILM COATED ORAL DAILY
Status: COMPLETED | OUTPATIENT
Start: 2024-11-07 | End: 2024-11-07

## 2024-11-06 RX ORDER — LORAZEPAM 1 MG/1
2 TABLET ORAL
Status: CANCELLED | OUTPATIENT
Start: 2024-11-06

## 2024-11-06 RX ORDER — IPRATROPIUM BROMIDE AND ALBUTEROL SULFATE 2.5; .5 MG/3ML; MG/3ML
1 SOLUTION RESPIRATORY (INHALATION)
Status: CANCELLED | OUTPATIENT
Start: 2024-11-06

## 2024-11-06 RX ORDER — ONDANSETRON 2 MG/ML
4 INJECTION INTRAMUSCULAR; INTRAVENOUS EVERY 6 HOURS PRN
Status: DISCONTINUED | OUTPATIENT
Start: 2024-11-06 | End: 2024-11-16 | Stop reason: HOSPADM

## 2024-11-06 RX ORDER — LANOLIN ALCOHOL/MO/W.PET/CERES
100 CREAM (GRAM) TOPICAL DAILY
Status: CANCELLED | OUTPATIENT
Start: 2024-11-07

## 2024-11-06 RX ORDER — PREDNISONE 20 MG/1
40 TABLET ORAL DAILY
Status: CANCELLED | OUTPATIENT
Start: 2024-11-07 | End: 2024-11-08

## 2024-11-06 RX ADMIN — ARFORMOTEROL TARTRATE: 15 SOLUTION RESPIRATORY (INHALATION) at 07:58

## 2024-11-06 RX ADMIN — GUAIFENESIN AND DEXTROMETHORPHAN 5 ML: 100; 10 SYRUP ORAL at 20:54

## 2024-11-06 RX ADMIN — Medication 100 MG: at 08:56

## 2024-11-06 RX ADMIN — ENOXAPARIN SODIUM 30 MG: 100 INJECTION SUBCUTANEOUS at 08:56

## 2024-11-06 RX ADMIN — IPRATROPIUM BROMIDE AND ALBUTEROL SULFATE 1 DOSE: .5; 3 SOLUTION RESPIRATORY (INHALATION) at 20:35

## 2024-11-06 RX ADMIN — ACETAMINOPHEN 650 MG: 325 TABLET ORAL at 11:38

## 2024-11-06 RX ADMIN — SODIUM CHLORIDE, PRESERVATIVE FREE 10 ML: 5 INJECTION INTRAVENOUS at 08:56

## 2024-11-06 RX ADMIN — FOLIC ACID 1 MG: 1 TABLET ORAL at 08:56

## 2024-11-06 RX ADMIN — THERA TABS 1 TABLET: TAB at 08:56

## 2024-11-06 RX ADMIN — ARFORMOTEROL TARTRATE: 15 SOLUTION RESPIRATORY (INHALATION) at 20:35

## 2024-11-06 RX ADMIN — ACETAMINOPHEN 650 MG: 325 TABLET ORAL at 05:16

## 2024-11-06 RX ADMIN — SODIUM CHLORIDE, PRESERVATIVE FREE 10 ML: 5 INJECTION INTRAVENOUS at 20:55

## 2024-11-06 RX ADMIN — IPRATROPIUM BROMIDE AND ALBUTEROL SULFATE 1 DOSE: .5; 3 SOLUTION RESPIRATORY (INHALATION) at 13:15

## 2024-11-06 RX ADMIN — AZITHROMYCIN 500 MG: 500 TABLET, FILM COATED ORAL at 08:56

## 2024-11-06 RX ADMIN — IPRATROPIUM BROMIDE AND ALBUTEROL SULFATE 1 DOSE: .5; 3 SOLUTION RESPIRATORY (INHALATION) at 07:59

## 2024-11-06 RX ADMIN — WATER 1000 MG: 1 INJECTION INTRAMUSCULAR; INTRAVENOUS; SUBCUTANEOUS at 20:54

## 2024-11-06 RX ADMIN — ROSUVASTATIN CALCIUM 20 MG: 20 TABLET, FILM COATED ORAL at 20:54

## 2024-11-06 RX ADMIN — FAMOTIDINE 20 MG: 20 TABLET ORAL at 08:56

## 2024-11-06 RX ADMIN — FAMOTIDINE 20 MG: 20 TABLET, FILM COATED ORAL at 20:54

## 2024-11-06 RX ADMIN — PREDNISONE 40 MG: 20 TABLET ORAL at 08:56

## 2024-11-06 ASSESSMENT — PAIN SCALES - GENERAL
PAINLEVEL_OUTOF10: 4
PAINLEVEL_OUTOF10: 0
PAINLEVEL_OUTOF10: 3
PAINLEVEL_OUTOF10: 0
PAINLEVEL_OUTOF10: 5
PAINLEVEL_OUTOF10: 8

## 2024-11-06 ASSESSMENT — PAIN - FUNCTIONAL ASSESSMENT: PAIN_FUNCTIONAL_ASSESSMENT: ACTIVITIES ARE NOT PREVENTED

## 2024-11-06 ASSESSMENT — PAIN DESCRIPTION - DESCRIPTORS: DESCRIPTORS: ACHING

## 2024-11-06 ASSESSMENT — PAIN DESCRIPTION - LOCATION
LOCATION: HEAD
LOCATION: CHEST;HEAD

## 2024-11-06 ASSESSMENT — PAIN DESCRIPTION - ORIENTATION: ORIENTATION: ANTERIOR

## 2024-11-06 NOTE — CONSULTS
Session ID: [id number]  Language: Lithuanian   ID: #[id number]   Name: Miya
mg at 11/05/24 0056    guaiFENesin-dextromethorphan (ROBITUSSIN DM) 100-10 MG/5ML syrup 5 mL  5 mL Oral Q4H PRN Marylu Pineda MD   5 mL at 11/05/24 1005    thiamine tablet 100 mg  100 mg Oral Daily Timo Lacy MD   100 mg at 11/06/24 0856    multivitamin 1 tablet  1 tablet Oral Daily Timo Lacy MD   1 tablet at 11/06/24 0856    folic acid (FOLVITE) tablet 1 mg  1 mg Oral Daily Timo Lacy MD   1 mg at 11/06/24 0856    LORazepam (ATIVAN) tablet 1 mg  1 mg Oral Q1H PRN Timo Lacy MD        Or    LORazepam (ATIVAN) injection 1 mg  1 mg IntraVENous Q1H PRN Timo Lacy MD        Or    LORazepam (ATIVAN) tablet 2 mg  2 mg Oral Q1H PRN Timo Lacy MD        Or    LORazepam (ATIVAN) injection 2 mg  2 mg IntraVENous Q1H PRN Timo Lacy MD        Or    LORazepam (ATIVAN) tablet 3 mg  3 mg Oral Q1H PRN Timo Lacy MD        Or    LORazepam (ATIVAN) injection 3 mg  3 mg IntraVENous Q1H PRN Timo Lacy MD        Or    LORazepam (ATIVAN) tablet 4 mg  4 mg Oral Q1H PRN Timo Lacy MD        Or    LORazepam (ATIVAN) injection 4 mg  4 mg IntraVENous Q1H PRN Timo Lacy MD           Vital Signs:  /80   Pulse 95   Temp 98.2 °F (36.8 °C)   Resp 19   Ht 1.676 m (5' 6\")   Wt 48.7 kg (107 lb 4.8 oz)   SpO2 91%   BMI 17.32 kg/m²  O2 Device: None (Room air)      Physical exam:   GEN: Sitting comfortably on the bed, not in acute distress.   CV: S1S2   LUNGS: Bilateral air entry but decreased on both sides but RIGHT > left.   EXT: No cyanosis  NEURO: Alert, awake and follows commands.      Lab/Diagnostic Studies:    Recent Results (from the past 24 hour(s))   WBC    Collection Time: 11/05/24 12:58 PM   Result Value Ref Range    WBC 19.0 (H) 4.1 - 11.1 K/uL             Imaging:    CTA CHEST W WO CONTRAST   Final Result      1. No evidence of pulmonary embolism.   2. Centrilobular emphysema.   3. Moderate right pleural effusion with right lower lobe atelectasis.   Superimposed pneumonia

## 2024-11-06 NOTE — PLAN OF CARE
Problem: Discharge Planning  Goal: Discharge to home or other facility with appropriate resources  11/5/2024 2143 by Dayana Henderson RN  Outcome: Progressing  11/5/2024 1606 by Madison Clarke RN  Outcome: Progressing     Problem: Pain  Goal: Verbalizes/displays adequate comfort level or baseline comfort level  11/5/2024 2143 by Dayana Henderson RN  Outcome: Progressing  11/5/2024 1606 by Madison Clarke RN  Outcome: Progressing     Problem: Safety - Adult  Goal: Free from fall injury  11/5/2024 2143 by Dayana Henderson RN  Outcome: Progressing  11/5/2024 1606 by Madison Clarke RN  Outcome: Progressing

## 2024-11-06 NOTE — PLAN OF CARE
Problem: Discharge Planning  Goal: Discharge to home or other facility with appropriate resources  11/6/2024 0725 by Danielle Jarquin, RN  Outcome: Progressing  11/5/2024 2143 by Dayana Henderson RN  Outcome: Progressing     Problem: Pain  Goal: Verbalizes/displays adequate comfort level or baseline comfort level  11/6/2024 0725 by Danielle Jarquin, RN  Outcome: Progressing  11/5/2024 2143 by Dayana Henderson RN  Outcome: Progressing     Problem: Safety - Adult  Goal: Free from fall injury  11/6/2024 0725 by Danielle Jarquin, RN  Outcome: Progressing  11/5/2024 2143 by Dayana Henderson RN  Outcome: Progressing

## 2024-11-06 NOTE — CARE COORDINATION
Transition of Care     RUR-10%    Patient's pulmonary appointment rescheduled and the earliest appointment available is 12/24/2024 at 930 am with a 915 arrival time.     Transition of Care Plan:    RUR: 10%  Prior Level of Functioning: IND  Disposition: Home with family  SARBJIT: 11/7/2024  If SNF or IPR: Date FOC offered: N/A  Date FOC received:   Accepting facility:   Date authorization started with reference number:   Date authorization received and expires:   Follow up appointments: On AVS  DME needed: Nebulizer ordered on 11/5/24 via [employer] per patient request after explaining the Freedom of Choice, to receive before leaving the hospital and received  Transportation at discharge: Family  IM/IMM Medicare/ letter given: N/A  Is patient a  and connected with VA? N/A  If yes, was [place] transfer form completed and [facility] notified?   Caregiver Contact: N/A  Discharge Caregiver contacted prior to discharge? N/A  Care Conference needed? N/A  Barriers to discharge: Clinical status      Esperanza Diamond, APRN - NP PCP - General Nurse Practitioner [phone] [place] [address] [facility] [address] [facility] [address]      Next Steps: Go on 11/26/2024  Instructions: PLEASE ATTEND YOUR APPOINTMENT ON 11/26/2024  pm with your primary care provider. This is the earliest available appointment they may call to schedule an earlier appolintment.    [facility]     [facility] [address] [facility] [address] [place] [address] [phone]     Next Steps: Schedule an appointment as soon as possible for a visit  Instructions: Please call to schedule appointment    Pulmonary Disease And [facility] Medicine     [address] [facility] [address] [address] [phone]     Next Steps: Go on 12/24/2024  Instructions: Please attend your post hospital new patient appointment on 12/24/2024 arrive at 915 AM to complete paperwork. Take photo ID, insurance card and list of medications.

## 2024-11-06 NOTE — PROGRESS NOTES
[facility]                                                                                          Hospitalist Progress Note  Timo Lacy MD  Office Phone: [phone]        Date of Service:  2024  NAME:  Fab Gonzalez  :  1960  MRN:  [id number]       Admission Summary:    Fab Gonzalez, 64 y.o. male with past medical history as documented below presents to the ED with c/o of acute onset of severe shortness of breath, cough since last night.  Has a history of COPD, lung cancer.  Patient is a poor historian.  Patient is nephew drove patient to the EMS bay and states that patient could not breathe.  Patient was recently seen here for COPD exacerbation and discharged home.  Denies any pleuritic chest pain, recent prolonged travel, history of DVT or PE.  Patient noted to have saturations of 83% which improved on room air.  After multiple treatments and interventions currently on 2 L of oxygen saturations 94%.  Patient also admitted to cocaine use prior to arrival.. Pt denies any other exacerbating or ameliorating factors. There are no other complaints, changes or physical findings pertinent to the HPI at this time.      ED  COURSE :   Ed  exame        General: He is in acute distress.      Appearance: Normal appearance. He is ill-appearing, toxic-appearing and diaphoretic.   Lung b/l   Diffuse wheezings   Given multiple Duoneb Rx    Steroid Rx    IV  abx  levaquin       Covid  and Influenza A/B negative   EKG   Sinus  HR 93    No acute  Ischemic  Injury pattern   Bilateral lower lobe atelectasis.       Interval history / Subjective:   The patient reported no improvement in his shortness of breath and continues to experience pain upon coughing. He has requested an additional day's stay, explaining that typically, he needs one or two days to return to his baseline health status. The chest pain described is pleuritic, occurring only when the 
        [facility]  [facility]ist [facility]                                                                                          Hospitalist Progress Note  Timo Lacy MD  Office Phone: [phone]        Date of Service:  11/3/2024  NAME:  Fab Gonzalez  :  1960  MRN:  [id number]       Admission Summary:    Fab Gonzalez, 64 y.o. male with past medical history as documented below presents to the ED with c/o of acute onset of severe shortness of breath, cough since last night.  Has a history of COPD, lung cancer.  Patient is a poor historian.  Patient is nephew drove patient to the EMS bay and states that patient could not breathe.  Patient was recently seen here for COPD exacerbation and discharged home.  Denies any pleuritic chest pain, recent prolonged travel, history of DVT or PE.  Patient noted to have saturations of 83% which improved on room air.  After multiple treatments and interventions currently on 2 L of oxygen saturations 94%.  Patient also admitted to cocaine use prior to arrival.. Pt denies any other exacerbating or ameliorating factors. There are no other complaints, changes or physical findings pertinent to the HPI at this time.      ED  COURSE :   Ed  exame        General: He is in acute distress.      Appearance: Normal appearance. He is ill-appearing, toxic-appearing and diaphoretic.   Lung b/l   Diffuse wheezings   Given multiple Duoneb Rx    Steroid Rx    IV  abx  levaquin       Covid  and Influenza A/B negative   EKG   Sinus  HR 93    No acute  Ischemic  Injury pattern   Bilateral lower lobe atelectasis.       Interval history / Subjective:   Patient endorsed smoking crack/cocaine day of admission, drinking 24 oz of beer daily, sometimes have malt liquor, couldn't specify the amount of alcohol he drinks but states he can stop anytime and doesn't have withdrawal symptoms. No hx of seizure    Currently lives in a 1 bed room apartment with his nephew 
        [facility]  [facility]                                                                                          Hospitalist Progress Note  Maira Pineda MD  Office Phone: [phone]        Date of Service:  2024  NAME:  Fab Gonzalez  :  1960  MRN:  [id number]       Admission Summary:    Fab Gonzalez, 64 y.o. male with past medical history as documented below presents to the ED with c/o of acute onset of severe shortness of breath, cough since last night.  Has a history of COPD, lung cancer.  Patient is a poor historian.  Patient is nephew drove patient to the EMS bay and states that patient could not breathe.  Patient was recently seen here for COPD exacerbation and discharged home.  Denies any pleuritic chest pain, recent prolonged travel, history of DVT or PE.  Patient noted to have saturations of 83% which improved on room air.  After multiple treatments and interventions currently on 2 L of oxygen saturations 94%.  Patient also admitted to cocaine use prior to arrival.. Pt denies any other exacerbating or ameliorating factors. There are no other complaints, changes or physical findings pertinent to the HPI at this time.      ED  COURSE :   Ed  exame        General: He is in acute distress.      Appearance: Normal appearance. He is ill-appearing, toxic-appearing and diaphoretic.   Lung b/l   Diffuse wheezings   Given multiple Duoneb Rx    Steroid Rx    IV  abx  levaquin       Covid  and Influenza A/B negative   EKG   Sinus  HR 93    No acute  Ischemic  Injury pattern   Bilateral lower lobe atelectasis.       Interval history / Subjective:   No acute event overnight.    Furthermore, the patient disclosed that his visit was prompted by crack cocaine use. He denies experiencing fever, chills, or an increase in sputum production and purulence.     Assessment & Plan:     Bronchospasm (improving)  Hypomagnesemia  Leukocytosis, likely due to steroids, 
  Physician Progress Note      PATIENT:               QUAN LOERA  [facility] #:                  [id number]  :                       1960  ADMIT DATE:       11/3/2024 3:55 AM  DISCH DATE:  RESPONDING  PROVIDER #:        Sarah Pineda MD          QUERY TEXT:    Dear Attending,    Patient admitted with acute onset of severe SOB and cough. Documentation   reflects probable PNA in 11/3 HP.  If possible, please document in the   progress notes and discharge summary if PNA was:    The medical record reflects the following:  Risk Factors: 64 year old male, COPD exacerbation, Cocaine abuse, h/s stage IV   lung adenocarcinoma  Clinical Indicators: 11/3 HP - Probable pneumonia  11/3 CXR - Bilateral lower lobe atelectasis.   CTA Chest - Moderate right pleural effusion with right lower lobe   atelectasis.  Superimposed pneumonia cannot be excluded.  Treatment: IV Ceftriaxone, IV Levaquin, nebulizers, monitor pulse oximetry      Please email [email or web address] with any questions  Options provided:  -- PNA confirmed after study  -- PNA treated and resolved  -- PNA ruled out after study  -- Other - I will add my own diagnosis  -- Disagree - Not applicable / Not valid  -- Disagree - Clinically unable to determine / Unknown  -- Refer to Clinical Documentation Reviewer    PROVIDER RESPONSE TEXT:    Provider is clinically unable to determine a response to this query.  Work-up in progress    Query created by: Karen Mc on 2024 6:53 AM      Electronically signed by:  Sarah Pineda MD 2024 7:14 AM          
 Face to Face Order for nebulizer          Pt Name  Fab Gonzalez   Date of Birth 1960   Age  64 y.o.   Medical Record Number  [id number]   Room Number  201/01   Admit date:  11/3/2024   Date of Face to Face: 11/05/2024     Admission Diagnosis:  COPD exacerbation (HCC)     Diagnoses   Present on Admission:   COPD exacerbation (HCC)     Past Medical History:   Diagnosis Date    Asthma       Vitals:    11/05/24 1134   BP: 131/86   Pulse: (!) 101   Resp: 18   Temp: 98.1 °F (36.7 °C)   SpO2: 100%         No Known Allergies         I certify that the patient needs nebulizer as prescribed below and I will not be following this patient in the Community.         Esperanza Barroso, MICKEY - PRISCILLA  will be responsible for signing the Plan of Care and will follow/coordinate ongoing care.    Initial Orders for Care:  Nebulizer with tubing  Report to Esperanza Witt, MICKEY - NP    I certify that I am taking care of the patient today (Please see hospital Discharge Records for details of clinical issues pertaining to this order).      ________________________________________________________________________  Maira Pineda MD               
0538:  Critical result [place] WBC 26.2.   Notified on call Dr. DARRICK Pineda, response at 0588 of \"noted\".     
0655:  TRANSFER - IN REPORT:    Verbal report received from SUSANA Farmer(name) on Fab Gonzalez  being received from ED(unit) for routine progression of patient care      Report consisted of patient’s Situation, Background, Assessment and   Recommendations(SBAR).     Information from the following report(s) ED SBAR, MAR, Recent Results, and Cardiac Rhythm NSR  was reviewed with the receiving nurse.    Opportunity for questions and clarification was provided.      Pt to be admitted to room 201 and assigned to SUSANA Candelaria as primary nurse for dayshift.  Room set up and prepared for admission.    0715:  Shift change report given to SUSANA Tatum.  Report included review of SBAR, accordion report, results review, orders, meds, ROS, and POC.  All questions answered.  Pt to be transferred from ED to unit after 0730 shift change complete.  Transfer of care complete.          
0702) Bedside and verbal shift report given to Danielle MEZA (oncoming nurse) from SUSANA Arevalo (offgoing nurse). Information discussed includes adult overview, MAR and recent lab results. Patient received resting in bed with eyes closed and visible chest rise and fall.     0815) Patient complaining of chest burning on deep inhales, pain rating of 5/10 received norco at 530 am, pt states that he wants to wait longer for the norco to work. Pt also complaining of shortness of breath, O2 on and sats at 92% on 2L/min    0920) Patient states that chest burning has decreased to pain rating of 2/10 and  that this pain is manageable without more pain meds at this time. This RN noticed that the patient did not have his oxygen on at this time and patient states shortness of breath has decreased. Patient now on room air with O2 at 93%. Patient also refusing lovenox, this RN educated on the importance of DVT prophylaxis and ambulation while hospitalized, especially if opting out of Lovenox shot. Patient verbalizes understanding     1015) IDR with MD, , pharmacist, nutritionist,  and OT complete. MD aware of patient's complaint of chest burning and mild dyspnea at rest.     1200) Patient states that dyspnea at rest and chest burning has further subsided without new interventions. Rates pain at 1/10    1530) Patient resting quietly in bed with no complaints of pain or discomfort.    1925) Verbal report given to SUSANA Arevalo (oncoming nurse) by Danielle MEZA (offgoing nurse)  
0707) Bedside and verbal shift report given to Danielle MEZA (oncoming nurse) from SUSANA Yung (offgoing nurse). Information discussed includes adult overview, MAR and recent lab results. Patient received resting in bed with eyes closed and visible chest rise and fall. No complaints from patient at this time. Plan of care continues.    1015) IDRs with nurse supervisor, MD, CM, pharmacist and PT complete.    1412) Verbal report given to SUSANA Candelaria at [facility] for transfer of Fab Gonzalez to [facility]. Information discussed includes adult overview, MAR, and significant diagnostic results.   
0719) Hand off report received.    0742) Patient reports pain 8/10.  Patient stated that Prednisone usually helps the pain.    0955) Patient resting quietly in bed.    1017) IDR with Dr. Pineda (MD), Vickie (pharmacist), Mrs. Rosenthal (), Ginger (nurse supervisor), Temitope (),  (PT), and Koko (RN) to discuss plan of care including repeating CIWA assessment and continue to monitor respiratory status.    1719) call received from Nini in CT department per her request to complete CT when patient has been NPO for 2-4 hours.  MD made aware.    1824) Patient transported down stairs to complete CT.    1846) Patient back to his room from CT.  
1910 Bedside and Verbal shift change report given to SUSANA Pham (oncoming nurse) by SUSANA Cross (offgoing nurse). Report included the following information Nurse Handoff Report, Intake/Output, MAR, and Recent Results.      0650 No safety event overnight.  
1915 Bedside and Verbal shift change report given to SUSANA Pham (oncoming nurse) by SUSANA Santos (offgoing nurse). Report included the following information Nurse Handoff Report, Intake/Output, MAR, and Recent Results.      0645  Patient potassium =3.2. No replacement order in place. MD perfect served.  
1946-Bedside shift change report given to SUSANA Rainey (oncoming nurse) by SUSANA Encarnacion (offgoing nurse). Report included the following information Nurse Handoff Report, Adult Overview, Surgery Report, and Intake/Output.   Pt received awake in bed, watching tv. No complaints of distress. RN rounds in place, plan of care to continue.    2140-Pt cooperative with assessments and medication administration.     0516-Tylenol given for a headache.  
[facility] Admission Pharmacy Medication Reconciliation    Information obtained from:  Rx Query, Care Everywhere [facility] office visit summary from 10/30/24; [facility]  RxQuery data available1: yes    Comments/recommendations:    1) The patient was not interviewed.   PTA med list based solely on recent fills on 10/30/24.   [facility] confirmed dispensing to patient; however, compliance has not been confirmed.    2) Medication changes to PTA list:    Added  See all below       1RxQuery pharmacy benefit data reflects medications filled and processed through the patient's insurance, however                this data does NOT capture whether the medication was picked up or is currently being taken by the patient.       Past Medical History/Disease States:  Past Medical History:   Diagnosis Date    Asthma          Patient allergies:   Allergies as of 11/03/2024    (No Known Allergies)         Prior to Admission Medications   Prescriptions  Informant     albuterol sulfate HFA (VENTOLIN HFA) 108 (90 Base) MCG/ACT inhaler  Outside Pharmacy/PCP     Sig: Inhale 2 puffs into the lungs 4 times daily as needed for Wheezing or Shortness of Breath   aspirin 81 MG EC tablet  Outside Pharmacy/PCP     Sig: Take 1 tablet by mouth daily   fluticasone-salmeterol (ADVAIR DISKUS) 250-50 MCG/ACT AEPB diskus inhaler  Outside Pharmacy/PCP     Sig: Inhale 1 puff into the lungs 2 times daily DO NOT USE WITH SPACER. RINSE MOUTH WITH WATER AFTER INHALATION.   ipratropium 0.5 mg-albuterol 2.5 mg (DUONEB) 0.5-2.5 (3) MG/3ML SOLN nebulizer solution  Outside Pharmacy/PCP     Sig: Take 3 mLs by nebulization every 4 hours as needed for Shortness of Breath or Wheezing   predniSONE (DELTASONE) 20 MG tablet  Outside Pharmacy/PCP     Sig: Take 2 tablets by mouth daily for 5 days   Take 2 tablets by mouth daily X 5 days form 10/30/24   rosuvastatin (CRESTOR) 20 MG tablet  Outside Pharmacy/PCP     Sig: Take 1 tablet by mouth daily   tamsulosin (FLOMAX) 0.4 
Patient’s case reviewed during interdisciplinary team meeting in Med Surg/Tele Unit 2.  Rev. Temitope Beckwiht MDiv, [facility]  
Patient’s case reviewed during interdisciplinary team meeting in Med Surg/Tele Unit 2.  Rev. Temitope Beckwith MDiv, [facility]  
Please call CT or Radiology concerning an IV access for the CTA of the chest.   
Spiritual Health History and Assessment/Progress Note  [facility]    Initial Encounter, Spiritual/Emotional Needs,  ,  ,      Name: Fab Gonzalez MRN: [id number]    Age: 64 y.o.     Sex: male   Language: English   Latter day: Other   COPD exacerbation (HCC)     Date: 11/4/2024            Total Time Calculated: 20 min              Spiritual Assessment began in [facility] 2 MED SURG & TELE            Encounter Overview/Reason: Initial Encounter, Spiritual/Emotional Needs  Service Provided For: Patient    Verna, Belief, Meaning:   Patient is connected with a verna tradition or spiritual practice and has beliefs or practices that help with coping during difficult times  Family/Friends No family/friends present      Importance and Influence:  Patient has no beliefs influential to healthcare decision-making identified during this visit  Family/Friends No family/friends present    Community:  Patient feels well-supported. Support system includes: Extended family  Family/Friends No family/friends present    Assessment and Plan of Care:     Patient Interventions include: Facilitated expression of thoughts and feelings, Explored spiritual coping/struggle/distress, and Affirmed coping skills/support systems  Family/Friends Interventions include: No family/friends present    Patient Plan of Care: Spiritual Care available upon further referral  Family/Friends Plan of Care: No family/friends present    Electronically signed by ELEAZAR Butterfield on 11/4/2024 at 4:27 PM  
The RN will call CT after speaking with the provider about the NPO status of the patient.  
Requirements: Ideal  Energy (kcal/day): 2450  Weight Used for Protein Requirements: Ideal  Protein (g/day): 90  Method Used for Fluid Requirements: Defer to provider  Fluid (ml/day): 2000    Nutrition Related Findings:   Edema: None                    Last BM: 11/03/24    Wounds:   Wound Type: None      Current Nutrition Intake & Therapies:    Average Meal Intake: %  Average Supplements Intake: None Ordered  ADULT DIET; Regular  ADULT ORAL NUTRITION SUPPLEMENT; Breakfast, Lunch, Dinner; Other Oral Supplement; Ensure Complete Strawberry  Meal Intake:   No data found.  Supplement Intake:  No data found.  Nutrition Support: none      Anthropometric Measures:  Height: 167.6 cm (5' 6\")  Ideal Body Weight (IBW): 142 lbs (65 kg)       Current Body Weight: 48.7 kg (107 lb 4.8 oz), 75.6 % IBW. Weight Source: Bed scale  Current BMI (kg/m2): 17.3        Weight Adjustment For: No Adjustment                 BMI Categories: Underweight (BMI less than 18.5)    Wt Readings from Last 10 Encounters:   11/03/24 48.7 kg (107 lb 4.8 oz)   10/29/24 49.3 kg (108 lb 9.6 oz)           Nutrition Diagnosis:   Inadequate protein-energy intake, Increased nutrient needs, Severe malnutrition, in context of chronic illness, Underweight, Unintended weight loss, Altered nutrition-related lab values, Limited adherence to nutrition-related recommendations related to catabolic illness, impaired respiratory function, inadequate protein-energy intake, psychological cause or life stress, increase demand for energy/nutrients as evidenced by poor intake prior to admission, BMI, weight loss, loss of subcutaneous fat, muscle loss, reduced  strength, criteria as identified in malnutrition assessment, poor dentition, patient reported barriers    Nutrition Interventions:   Food and/or Nutrient Delivery: Modify Current Diet, Start Oral Nutrition Supplement  Nutrition Education/Counseling: No recommendation at this time  Coordination of Nutrition Care: 
    Recent Labs     11/04/24  0436 11/05/24 0457    137   K 3.2* 3.2*    102   CO2 26 27   BUN 13 15   MG 1.6 1.4*   PHOS  --  2.2*     Recent Labs     11/05/24 0457   ALT 17   GLOB 5.1*     No results for input(s): \"INR\", \"APTT\" in the last 72 hours.    Invalid input(s): \"PTP\"   No results for input(s): \"TIBC\" in the last 72 hours.    Invalid input(s): \"FE\", \"PSAT\", \"FERR\"   No results found for: \"RBCF\"   No results for input(s): \"PH\", \"PCO2\", \"PO2\" in the last 72 hours.  No results for input(s): \"CPK\" in the last 72 hours.    Invalid input(s): \"CPKMB\", \"CKNDX\", \"TROIQ\"  No results found for: \"CHOL\", \"CHLST\", \"CHOLV\", \"HDL\", \"HDLC\", \"LDL\", \"LDLC\"  No results found for: \"GLUCPOC\"  [unfilled]    Notes reviewed from all clinical/nonclinical/nursing services involved in patient's clinical care. Care coordination discussions were held with appropriate clinical/nonclinical/ nursing providers based on care coordination needs.         Patients current active Medications were reviewed, considered, added and adjusted based on the clinical condition today.      Home Medications were reconciled to the best of my ability given all available resources at the time of admission. Route is PO if not otherwise noted.      Admission Status:[id number]:::1}      Medications Reviewed:     Current Facility-Administered Medications   Medication Dose Route Frequency    magnesium sulfate 2000 mg in 50 mL IVPB premix  2,000 mg IntraVENous PRN    azithromycin (ZITHROMAX) tablet 500 mg  500 mg Oral Daily    cefTRIAXone (ROCEPHIN) 1,000 mg in sodium chloride 0.9 % 50 mL IVPB (mini-bag)  1,000 mg IntraVENous Q24H    predniSONE (DELTASONE) tablet 40 mg  40 mg Oral Daily    arformoterol 15 mcg-budesonide 0.5 mg neb solution   Nebulization BID RT    sodium chloride flush 0.9 % injection 5-40 mL  5-40 mL IntraVENous 2 times per day    sodium chloride flush 0.9 % injection 5-40 mL  5-40 mL IntraVENous PRN    0.9 % sodium chloride infusion

## 2024-11-06 NOTE — H&P
Hospitalist Admission Note    NAME:   Fab Gonzalez   : 1960   MRN: [id number]     Date/Time: 2024 3:48 PM    Patient PCP: Esperanza Diamond APRN - NP    ______________________________________________________________________  Given the patient's current clinical presentation, I have a high level of concern for decompensation if discharged from the emergency department.  Complex decision making was performed, which includes reviewing the patient's available past medical records, laboratory results, and x-ray films.       My assessment of this patient's clinical condition and my plan of care is as follows.    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   Cont with duonebs  Cont antibiotics- rocephin + azithromycin  Cont steroids- prednisone  If he continues to have dyspnea then prednisone can be used for longer  Check procal  Oxygen prn  Cessation counseling given again on arrival to [facility]  WBC trended down to 18k today  Rapid Flu and COVID test ng at [facility]    Moderate Right sided pleural effusion POA  Suspect Malignant Pleural Effusion given history of lung cancer stage 4 POA- in remission since  per pt, follows at [facility] oncology  Protein Calorie Malnutrition POA    Pt transferred to [facility] for R Thoracentesis for cytology to rule out metastatic disease.   [facility] consultation at [facility] before transfer to [facility]- if need arises will need IP Pulm consult for [facility] here.  IR consulted for R thoracentesis in AM     Hypokalemia- resolved now 3.6  HypoMagnesemia- persistent, 1.5 today    Cont to replenish Mag PO now    Substance Abuse disorder- cocaine, Alcohol      Medical Decision Making:   I personally reviewed  inhaler Inhale 1 puff into the lungs 2 times daily DO NOT USE WITH SPACER. RINSE MOUTH WITH WATER AFTER INHALATION. 10/30/24 10/30/25  Abiola Fleming MD   ipratropium 0.5 mg-albuterol 2.5 mg (DUONEB) 0.5-2.5 (3) MG/3ML SOLN nebulizer solution Take 3 mLs by nebulization every 4 hours as needed for Shortness of Breath or Wheezing 10/30/24 10/30/25  Abiola Fleming MD   rosuvastatin (CRESTOR) 20 MG tablet Take 1 tablet by mouth daily 10/30/24 4/28/25  Abiola Fleming MD   tamsulosin (FLOMAX) 0.4 MG capsule Take 1 capsule by mouth daily 10/30/24 10/30/25  Abiola Fleming MD   tiotropium (SPIRIVA) 18 MCG inhalation capsule Inhale 1 capsule into the lungs daily PLACE ONE (1) CAPSULE INTO HANDIHALER DEVICE AND INHALE CONTENTS ONCE DAILY. USE TWO PUFFS TO INHALE THE FULL DOSE. 10/30/24 10/30/25  Abiola Fleming MD   predniSONE (DELTASONE) 20 MG tablet Take 2 tablets by mouth daily for 10 days  Patient taking differently: Take 2 tablets by mouth daily X 5 days form 10/30/24 10/29/24 11/8/24  Faustina Jones PA-C   albuterol sulfate HFA (VENTOLIN HFA) 108 (90 Base) MCG/ACT inhaler Inhale 2 puffs into the lungs 4 times daily as needed for Wheezing or Shortness of Breath 10/29/24   Faustina Jones PA-C         Objective:   VITALS:    Patient Vitals for the past 24 hrs:   BP Temp Temp src Pulse Resp SpO2   24 1530 123/80 97.9 °F (36.6 °C) Oral (!) 109 20 92 %       Temp (24hrs), Av.3 °F (36.8 °C), Min:97.9 °F (36.6 °C), Max:99 °F (37.2 °C)        O2 Device: None (Room air)    Wt Readings from Last 12 Encounters:   24 48.7 kg (107 lb 4.8 oz)   10/29/24 49.3 kg (108 lb 9.6 oz)         PHYSICAL EXAM:  General:    Alert, cooperative, appears stated age.   Cachexia+, thin+, poorly nourished +  Lungs:   Decreased BS on the R lowr base +  Chest wall:  No tenderness.  No accessory muscle use.  Heart:   Regular  rhythm,  No  Murmur.   No edema  Abdomen:   Soft, NT. ND

## 2024-11-06 NOTE — DISCHARGE SUMMARY
Discharge Summary   Please note that this dictation was completed with Qloo, the computer voice recognition software.  Quite often unanticipated grammatical, syntax, homophones, and other interpretive errors are inadvertently transcribed by the computer software.  Please disregard these errors.  Please excuse any errors that have escaped final proofreading.    PATIENT ID: Fab Gonzalez  MRN: [id number]   YOB: 1960    DATE OF ADMISSION: 11/3/2024  3:55 AM    DATE OF DISCHARGE: 11/6/2024  PRIMARY CARE PROVIDER: Esperanza Diamond APRN - PRISCILLA         ATTENDING PHYSICIAN: Maira Pineda MD  DISCHARGING PROVIDER: Maira Pineda MD       CONSULTATIONS: IP CONSULT TO PHARMACY  IP CONSULT TO SOCIAL WORK  IP CONSULT TO CASE MANAGEMENT  IP CONSULT TO PULMONOLOGY    PROCEDURES/SURGERIES: * No surgery found *    ADMITTING HPI from excerpted H&P   Fab Gonzalez, 64 y.o. male with past medical history as documented below presents to the ED with c/o of acute onset of severe shortness of breath, cough since last night.  Has a history of COPD, lung cancer.  Patient is a poor historian.  Patient is nephew drove patient to the EMS bay and states that patient could not breathe.  Patient was recently seen here for COPD exacerbation and discharged home.  Denies any pleuritic chest pain, recent prolonged travel, history of DVT or PE.  Patient noted to have saturations of 83% which improved on room air.  After multiple treatments and interventions currently on 2 L of oxygen saturations 94%.  Patient also admitted to cocaine use prior to arrival.. Pt denies any other exacerbating or ameliorating factors. There are no other complaints, changes or physical findings pertinent to the HPI at this time.      ED  COURSE :   Ed  exame        General: He is in acute distress.      Appearance: Normal appearance. He is ill-appearing, toxic-appearing and diaphoretic.   Lung b/l   Diffuse wheezings   Given multiple Duoneb Rx

## 2024-11-07 ENCOUNTER — APPOINTMENT (OUTPATIENT)
Facility: HOSPITAL | Age: 64
End: 2024-11-07
Attending: INTERNAL MEDICINE
Payer: MEDICAID

## 2024-11-07 LAB
ANION GAP SERPL CALC-SCNC: 7 MMOL/L (ref 2–12)
APPEARANCE FLD: ABNORMAL
B PERT DNA SPEC QL NAA+PROBE: NOT DETECTED
BASOPHILS # BLD: 0 K/UL (ref 0–0.1)
BASOPHILS NFR BLD: 0 % (ref 0–1)
BODY FLD TYPE: NORMAL
BORDETELLA PARAPERTUSSIS BY PCR: NOT DETECTED
BUN SERPL-MCNC: 9 MG/DL (ref 6–20)
BUN/CREAT SERPL: 18 (ref 12–20)
C PNEUM DNA SPEC QL NAA+PROBE: NOT DETECTED
CALCIUM SERPL-MCNC: 9.1 MG/DL (ref 8.5–10.1)
CHLORIDE SERPL-SCNC: 101 MMOL/L (ref 97–108)
CO2 SERPL-SCNC: 24 MMOL/L (ref 21–32)
COLOR FLD: YELLOW
COMMENT:: NORMAL
CREAT SERPL-MCNC: 0.49 MG/DL (ref 0.7–1.3)
DIFFERENTIAL METHOD BLD: ABNORMAL
EOSINOPHIL # BLD: 0 K/UL (ref 0–0.4)
EOSINOPHIL NFR BLD: 0 % (ref 0–7)
ERYTHROCYTE [DISTWIDTH] IN BLOOD BY AUTOMATED COUNT: 13.1 % (ref 11.5–14.5)
FLUAV SUBTYP SPEC NAA+PROBE: NOT DETECTED
FLUBV RNA SPEC QL NAA+PROBE: NOT DETECTED
GLUCOSE BLD STRIP.AUTO-MCNC: 172 MG/DL (ref 65–117)
GLUCOSE FLD-MCNC: 1 MG/DL
GLUCOSE SERPL-MCNC: 87 MG/DL (ref 65–100)
HADV DNA SPEC QL NAA+PROBE: NOT DETECTED
HCOV 229E RNA SPEC QL NAA+PROBE: NOT DETECTED
HCOV HKU1 RNA SPEC QL NAA+PROBE: NOT DETECTED
HCOV NL63 RNA SPEC QL NAA+PROBE: NOT DETECTED
HCOV OC43 RNA SPEC QL NAA+PROBE: NOT DETECTED
HCT VFR BLD AUTO: 40.1 % (ref 36.6–50.3)
HGB BLD-MCNC: 14 G/DL (ref 12.1–17)
HMPV RNA SPEC QL NAA+PROBE: NOT DETECTED
HPIV1 RNA SPEC QL NAA+PROBE: NOT DETECTED
HPIV2 RNA SPEC QL NAA+PROBE: NOT DETECTED
HPIV3 RNA SPEC QL NAA+PROBE: NOT DETECTED
HPIV4 RNA SPEC QL NAA+PROBE: NOT DETECTED
IMM GRANULOCYTES # BLD AUTO: 0.4 K/UL (ref 0–0.04)
IMM GRANULOCYTES NFR BLD AUTO: 2 % (ref 0–0.5)
LDH FLD L TO P-CCNC: 2702 U/L
LYMPHOCYTES # BLD: 3.7 K/UL (ref 0.8–3.5)
LYMPHOCYTES NFR BLD: 20 % (ref 12–49)
LYMPHOCYTES NFR FLD: 2 %
M PNEUMO DNA SPEC QL NAA+PROBE: NOT DETECTED
MCH RBC QN AUTO: 31.5 PG (ref 26–34)
MCHC RBC AUTO-ENTMCNC: 34.9 G/DL (ref 30–36.5)
MCV RBC AUTO: 90.3 FL (ref 80–99)
MONOCYTES # BLD: 2.2 K/UL (ref 0–1)
MONOCYTES NFR BLD: 12 % (ref 5–13)
MONOS+MACROS NFR FLD: 8 %
NEUTROPHILS NFR FLD: 90 %
NEUTS SEG # BLD: 12.4 K/UL (ref 1.8–8)
NEUTS SEG NFR BLD: 66 % (ref 32–75)
NRBC # BLD: 0 K/UL (ref 0–0.01)
NRBC BLD-RTO: 0 PER 100 WBC
NUC CELL # FLD: 9194 /CU MM
PH FLD: 6.8
PLATELET # BLD AUTO: 294 K/UL (ref 150–400)
PMV BLD AUTO: 8.2 FL (ref 8.9–12.9)
POTASSIUM SERPL-SCNC: 3.5 MMOL/L (ref 3.5–5.1)
PROCALCITONIN SERPL-MCNC: 0.1 NG/ML
PROT FLD-MCNC: 4.6 G/DL
RBC # BLD AUTO: 4.44 M/UL (ref 4.1–5.7)
RBC # FLD: >100 /CU MM
RBC MORPH BLD: ABNORMAL
RSV RNA SPEC QL NAA+PROBE: NOT DETECTED
RV+EV RNA SPEC QL NAA+PROBE: NOT DETECTED
SARS-COV-2 RNA RESP QL NAA+PROBE: NOT DETECTED
SERVICE CMNT-IMP: ABNORMAL
SODIUM SERPL-SCNC: 132 MMOL/L (ref 136–145)
SPECIMEN HOLD: NORMAL
SPECIMEN SOURCE FLD: ABNORMAL
SPECIMEN SOURCE FLD: NORMAL
WBC # BLD AUTO: 18.7 K/UL (ref 4.1–11.1)

## 2024-11-07 PROCEDURE — 36415 COLL VENOUS BLD VENIPUNCTURE: CPT

## 2024-11-07 PROCEDURE — 83986 ASSAY PH BODY FLUID NOS: CPT

## 2024-11-07 PROCEDURE — 87040 BLOOD CULTURE FOR BACTERIA: CPT

## 2024-11-07 PROCEDURE — 6370000000 HC RX 637 (ALT 250 FOR IP): Performed by: NURSE PRACTITIONER

## 2024-11-07 PROCEDURE — 83615 LACTATE (LD) (LDH) ENZYME: CPT

## 2024-11-07 PROCEDURE — 88305 TISSUE EXAM BY PATHOLOGIST: CPT

## 2024-11-07 PROCEDURE — 87449 NOS EACH ORGANISM AG IA: CPT

## 2024-11-07 PROCEDURE — 71045 X-RAY EXAM CHEST 1 VIEW: CPT

## 2024-11-07 PROCEDURE — 1100000000 HC RM PRIVATE

## 2024-11-07 PROCEDURE — 94640 AIRWAY INHALATION TREATMENT: CPT

## 2024-11-07 PROCEDURE — 6370000000 HC RX 637 (ALT 250 FOR IP): Performed by: INTERNAL MEDICINE

## 2024-11-07 PROCEDURE — 87070 CULTURE OTHR SPECIMN AEROBIC: CPT

## 2024-11-07 PROCEDURE — 6360000002 HC RX W HCPCS: Performed by: INTERNAL MEDICINE

## 2024-11-07 PROCEDURE — 80048 BASIC METABOLIC PNL TOTAL CA: CPT

## 2024-11-07 PROCEDURE — 94761 N-INVAS EAR/PLS OXIMETRY MLT: CPT

## 2024-11-07 PROCEDURE — 0W993ZZ DRAINAGE OF RIGHT PLEURAL CAVITY, PERCUTANEOUS APPROACH: ICD-10-PCS | Performed by: STUDENT IN AN ORGANIZED HEALTH CARE EDUCATION/TRAINING PROGRAM

## 2024-11-07 PROCEDURE — 88112 CYTOPATH CELL ENHANCE TECH: CPT

## 2024-11-07 PROCEDURE — 84157 ASSAY OF PROTEIN OTHER: CPT

## 2024-11-07 PROCEDURE — 82945 GLUCOSE OTHER FLUID: CPT

## 2024-11-07 PROCEDURE — 87205 SMEAR GRAM STAIN: CPT

## 2024-11-07 PROCEDURE — 82962 GLUCOSE BLOOD TEST: CPT

## 2024-11-07 PROCEDURE — 0202U NFCT DS 22 TRGT SARS-COV-2: CPT

## 2024-11-07 PROCEDURE — 2709999900 US THORACENTESIS

## 2024-11-07 PROCEDURE — 84145 PROCALCITONIN (PCT): CPT

## 2024-11-07 PROCEDURE — 89050 BODY FLUID CELL COUNT: CPT

## 2024-11-07 PROCEDURE — 85025 COMPLETE CBC W/AUTO DIFF WBC: CPT

## 2024-11-07 PROCEDURE — 2580000003 HC RX 258: Performed by: INTERNAL MEDICINE

## 2024-11-07 RX ORDER — LIDOCAINE HYDROCHLORIDE 10 MG/ML
10 INJECTION, SOLUTION EPIDURAL; INFILTRATION; INTRACAUDAL; PERINEURAL ONCE
Status: DISCONTINUED | OUTPATIENT
Start: 2024-11-07 | End: 2024-11-12

## 2024-11-07 RX ORDER — LIDOCAINE 4 G/G
1 PATCH TOPICAL DAILY PRN
Status: DISCONTINUED | OUTPATIENT
Start: 2024-11-07 | End: 2024-11-16 | Stop reason: HOSPADM

## 2024-11-07 RX ORDER — PREDNISONE 20 MG/1
40 TABLET ORAL DAILY
Status: COMPLETED | OUTPATIENT
Start: 2024-11-08 | End: 2024-11-11

## 2024-11-07 RX ADMIN — ACETAMINOPHEN 650 MG: 325 TABLET ORAL at 17:44

## 2024-11-07 RX ADMIN — ALBUTEROL SULFATE 2.5 MG: 2.5 SOLUTION RESPIRATORY (INHALATION) at 04:30

## 2024-11-07 RX ADMIN — IPRATROPIUM BROMIDE AND ALBUTEROL SULFATE 1 DOSE: .5; 3 SOLUTION RESPIRATORY (INHALATION) at 07:34

## 2024-11-07 RX ADMIN — SODIUM CHLORIDE, PRESERVATIVE FREE 10 ML: 5 INJECTION INTRAVENOUS at 09:32

## 2024-11-07 RX ADMIN — FAMOTIDINE 20 MG: 20 TABLET, FILM COATED ORAL at 20:54

## 2024-11-07 RX ADMIN — AZITHROMYCIN 500 MG: 250 TABLET, FILM COATED ORAL at 09:31

## 2024-11-07 RX ADMIN — THERA TABS 1 TABLET: TAB at 09:31

## 2024-11-07 RX ADMIN — ARFORMOTEROL TARTRATE: 15 SOLUTION RESPIRATORY (INHALATION) at 19:18

## 2024-11-07 RX ADMIN — ROSUVASTATIN CALCIUM 20 MG: 20 TABLET, FILM COATED ORAL at 20:54

## 2024-11-07 RX ADMIN — ENOXAPARIN SODIUM 30 MG: 100 INJECTION SUBCUTANEOUS at 09:32

## 2024-11-07 RX ADMIN — FOLIC ACID 1 MG: 1 TABLET ORAL at 09:31

## 2024-11-07 RX ADMIN — SODIUM CHLORIDE, PRESERVATIVE FREE 10 ML: 5 INJECTION INTRAVENOUS at 20:54

## 2024-11-07 RX ADMIN — Medication 100 MG: at 09:31

## 2024-11-07 RX ADMIN — IPRATROPIUM BROMIDE AND ALBUTEROL SULFATE 1 DOSE: .5; 3 SOLUTION RESPIRATORY (INHALATION) at 11:12

## 2024-11-07 RX ADMIN — GUAIFENESIN AND DEXTROMETHORPHAN 5 ML: 100; 10 SYRUP ORAL at 17:44

## 2024-11-07 RX ADMIN — GUAIFENESIN AND DEXTROMETHORPHAN 5 ML: 100; 10 SYRUP ORAL at 04:25

## 2024-11-07 RX ADMIN — PREDNISONE 40 MG: 20 TABLET ORAL at 09:31

## 2024-11-07 RX ADMIN — WATER 1000 MG: 1 INJECTION INTRAMUSCULAR; INTRAVENOUS; SUBCUTANEOUS at 20:54

## 2024-11-07 RX ADMIN — TAMSULOSIN HYDROCHLORIDE 0.4 MG: 0.4 CAPSULE ORAL at 09:32

## 2024-11-07 RX ADMIN — ARFORMOTEROL TARTRATE: 15 SOLUTION RESPIRATORY (INHALATION) at 07:40

## 2024-11-07 RX ADMIN — ACETAMINOPHEN 650 MG: 325 TABLET ORAL at 04:25

## 2024-11-07 RX ADMIN — IPRATROPIUM BROMIDE AND ALBUTEROL SULFATE 1 DOSE: .5; 3 SOLUTION RESPIRATORY (INHALATION) at 19:13

## 2024-11-07 RX ADMIN — FAMOTIDINE 20 MG: 20 TABLET, FILM COATED ORAL at 09:31

## 2024-11-07 RX ADMIN — IPRATROPIUM BROMIDE AND ALBUTEROL SULFATE 1 DOSE: .5; 3 SOLUTION RESPIRATORY (INHALATION) at 15:18

## 2024-11-07 ASSESSMENT — PAIN DESCRIPTION - PAIN TYPE
TYPE: ACUTE PAIN
TYPE: ACUTE PAIN

## 2024-11-07 ASSESSMENT — PAIN DESCRIPTION - ORIENTATION
ORIENTATION: RIGHT
ORIENTATION: RIGHT

## 2024-11-07 ASSESSMENT — PAIN SCALES - GENERAL
PAINLEVEL_OUTOF10: 0
PAINLEVEL_OUTOF10: 0
PAINLEVEL_OUTOF10: 2
PAINLEVEL_OUTOF10: 2
PAINLEVEL_OUTOF10: 0

## 2024-11-07 ASSESSMENT — PAIN DESCRIPTION - DESCRIPTORS: DESCRIPTORS: ACHING

## 2024-11-07 ASSESSMENT — PAIN DESCRIPTION - LOCATION
LOCATION: FLANK
LOCATION: FLANK

## 2024-11-07 NOTE — CONSULTS
°F (36.4 °C) (Oral)   Resp 20   SpO2 97%    Temp (24hrs), Av.5 °F (36.9 °C), Min:97.5 °F (36.4 °C), Max:100.4 °F (38 °C)     Intake/Output:     Last shift: No intake/output data recorded.    Last 3 shifts:  1901 -  0700  In: 400 [P.O.:400]  Out: -         Intake/Output Summary (Last 24 hours) at 2024 1401  Last data filed at 2024 0404  Gross per 24 hour   Intake 400 ml   Output --   Net 400 ml       Physical Exam:                                        Exam Findings Other   General: No resp distress noted, appears stated age    HEENT:  EOMI    Chest: No pectus deformity, normal chest rise b/l    HEART:  RRR, no murmurs/rubs/gallops    Lungs:  CTA b/l, no rhonchi/crackles/wheeze, diminished BS at bases    ABD: Soft/NT, non rigid mildly distended    EXT: No cyanosis/clubbing/edema,     Skin: No rashes or ulcers, no mottling    Neuro: A/O x 3        Medications:  Current Facility-Administered Medications   Medication Dose Route Frequency    lidocaine PF 1 % injection 10 mL  10 mL IntraDERmal Once    sodium chloride flush 0.9 % injection 5-40 mL  5-40 mL IntraVENous 2 times per day    sodium chloride flush 0.9 % injection 5-40 mL  5-40 mL IntraVENous PRN    0.9 % sodium chloride infusion   IntraVENous PRN    ondansetron (ZOFRAN-ODT) disintegrating tablet 4 mg  4 mg Oral Q8H PRN    Or    ondansetron (ZOFRAN) injection 4 mg  4 mg IntraVENous Q6H PRN    polyethylene glycol (GLYCOLAX) packet 17 g  17 g Oral Daily PRN    acetaminophen (TYLENOL) tablet 650 mg  650 mg Oral Q6H PRN    Or    acetaminophen (TYLENOL) suppository 650 mg  650 mg Rectal Q6H PRN    famotidine (PEPCID) tablet 20 mg  20 mg Oral BID    aluminum & magnesium hydroxide-simethicone (MAALOX) 200-200-20 MG/5ML suspension 30 mL  30 mL Oral Q6H PRN    ipratropium 0.5 mg-albuterol 2.5 mg (DUONEB) nebulizer solution 1 Dose  1 Dose Inhalation 4x Daily RT    albuterol (PROVENTIL) (2.5 MG/3ML) 0.083% nebulizer solution 2.5 mg  2.5 mg  Nebulization Q6H PRN    guaiFENesin-dextromethorphan (ROBITUSSIN DM) 100-10 MG/5ML syrup 5 mL  5 mL Oral Q4H PRN    thiamine mononitrate tablet 100 mg  100 mg Oral Daily    multivitamin 1 tablet  1 tablet Oral Daily    folic acid (FOLVITE) tablet 1 mg  1 mg Oral Daily    LORazepam (ATIVAN) tablet 1 mg  1 mg Oral Q1H PRN    Or    LORazepam (ATIVAN) injection 1 mg  1 mg IntraVENous Q1H PRN    Or    LORazepam (ATIVAN) tablet 2 mg  2 mg Oral Q1H PRN    Or    LORazepam (ATIVAN) injection 2 mg  2 mg IntraVENous Q1H PRN    Or    LORazepam (ATIVAN) tablet 3 mg  3 mg Oral Q1H PRN    Or    LORazepam (ATIVAN) injection 3 mg  3 mg IntraVENous Q1H PRN    Or    LORazepam (ATIVAN) tablet 4 mg  4 mg Oral Q1H PRN    Or    LORazepam (ATIVAN) injection 4 mg  4 mg IntraVENous Q1H PRN    arformoterol 15 mcg-budesonide 0.5 mg neb solution   Nebulization BID RT    magnesium sulfate 2000 mg in 50 mL IVPB premix  2,000 mg IntraVENous PRN    cefTRIAXone (ROCEPHIN) 1,000 mg in sterile water 10 mL IV syringe  1,000 mg IntraVENous Q24H    tamsulosin (FLOMAX) capsule 0.4 mg  0.4 mg Oral Daily    rosuvastatin (CRESTOR) tablet 20 mg  20 mg Oral Nightly    enoxaparin Sodium (LOVENOX) injection 30 mg  30 mg SubCUTAneous Daily       Labs:  ABG Invalid input(s): \"PHI\", \"PCO2I\", \"PO2I\", \"HCO3I\", \"SO2I\", \"FIO2I\"     CBC Recent Labs     11/05/24 0457 11/05/24  1258 11/06/24  1618 11/07/24  0406   WBC 26.2* 19.0* 18.0* 18.7*   HGB 13.2  --  13.9 14.0   HCT 39.2  --  40.0 40.1     --  299 294   MCV 94.5  --  91.7 90.3   MCH 31.8  --  31.9 31.5        Metabolic  Panel Recent Labs     11/05/24  0457 11/06/24  1618 11/07/24  0406    134* 132*   K 3.2* 3.6 3.5    102 101   CO2 27 23 24   BUN 15 12 9   MG 1.4* 1.5*  --    PHOS 2.2*  --   --    ALT 17  --   --         Pertinent Labs                Roberta Navarrete NP

## 2024-11-07 NOTE — PLAN OF CARE
Problem: Discharge Planning  Goal: Discharge to home or other facility with appropriate resources  Outcome: Progressing     Problem: Respiratory - Adult  Goal: Achieves optimal ventilation and oxygenation  11/6/2024 2322 by Esthela Longoria RN  Outcome: Progressing  11/6/2024 2043 by Leslie Porter, RT  Outcome: Progressing

## 2024-11-07 NOTE — PROGRESS NOTES
End of Shift Note    Bedside shift change report given to Seda MEZA (oncoming nurse) by Esthela Longoria RN (offgoing nurse).  Report included the following information SBAR, Kardex, MAR, Recent Results, Cardiac Rhythm NSR, and Alarm Parameters     Shift worked:  7P-7A     Shift summary and any significant changes:    Patient slept poorly,  Febrile at 0400 hrs 100.4 PO acetaminophen given,notified NP [place],Ordered Blood cultures.collected and sent. Recheck Temp 98.8  Periods of SOB, SpO2 90%-97% RA. PRN Nebs given.  NPO started at midnight for thoracentesis today.  Plan of care ongoing.    Concerns for physician to address:       Zone phone for oncoming shift:          Activity:     Number times ambulated in hallways past shift: 0  Number of times OOB to chair past shift: 0    Cardiac:   Cardiac Monitoring: Yes           Access:  Current line(s): PIV     Genitourinary:   Urinary status: voiding    Respiratory:      Chronic home O2 use?: NO  Incentive spirometer at bedside: NO       GI:     Current diet:  Diet NPO Exceptions are: Sips of Water with Meds  Passing flatus: YES  Tolerating current diet: YES       Pain Management:   Patient states pain is manageable on current regimen: N/A    Skin:     Interventions: increase time out of bed    Patient Safety:  Fall Score:    Interventions: bed/chair alarm, assistive device (walker, cane. etc), gripper socks, pt to call before getting OOB, and stay with me (per policy)       Length of Stay:  Expected LOS: 4  Actual LOS: 1      Esthela Longoria RN

## 2024-11-07 NOTE — CARE COORDINATION
Care Management Initial Assessment       RUR: 15%  Readmission? No- not true readmit, transfer from [facility]  1st IM letter given? No  1st  letter given: No     Chart reviewed. CM met with pt and friends at the bedside to introduce self and role. Verified contact information and demographics. Pt transferred from [facility] for thoracentesis and pulmonary consult. Pt has been residing at [facility] with his sister (Nikolas Bergman) for the last 3 weeks. He plans to return there upon discharge. He is typically independent and ambulatory without a device. He is unemployed and receives disability. Preferred pharmacy is [facility]. Pt reports a friend will transport upon d/c. Will continue to follow should disposition needs arise.    Pt confirms he received a new home nebulizer from [facility] prior to transfer.     11/07/24 6790   Service Assessment   Patient Orientation Alert and Oriented   Cognition Alert   History Provided By Patient;Friend;Medical Record   Primary Caregiver Self   Accompanied By/Relationship friends   Support Systems Family Members;Friends/Neighbors   Patient's Healthcare Decision Maker is: Patient Declined (Legal Next of Kin Remains as Decision Maker)   PCP Verified by CM Yes   Last Visit to PCP Within last 3 months   Prior Functional Level Independent in ADLs/IADLs   Current Functional Level Independent in ADLs/IADLs   Can patient return to prior living arrangement Yes   Ability to make needs known: Good   Family able to assist with home care needs: No   Would you like for me to discuss the discharge plan with any other family members/significant others, and if so, who? No   Financial Resources Medicaid;Food [place]   Social/Functional History   Lives With Family  (staying with sister at [facility])   Home Layout Multi-level   Home Access Level entry;Elevator   Bathroom Accessibility Accessible   Home Equipment None   Receives Help From Family   ADL Assistance Independent   Homemaking Assistance  consistent with Legal Next of Kin hierarchy.  Content/Action Overview:  DECLINED ACP Conversation - will revisit periodically  Reviewed DNR/DNI and patient elects Full Code (Attempt Resuscitation)    Has three children whom he is estranged from.    Length of Voluntary ACP Conversation in minutes:  <16 minutes (Non-Billable)    BAR Madden   Care Manager, [facility]  [phone]

## 2024-11-07 NOTE — PROGRESS NOTES
ADULT PROTOCOL: JET AEROSOL ASSESSMENT    Patient  Fab Gonzalez     64 y.o.   male     11/7/2024  8:26 AM    Breath Sounds Pre Procedure: Breath Sounds Pre-Tx KIA: Diminished                                  Breath Sounds Pre-Tx LLL: Diminished        Breath Sounds Pre-Tx RUL: Diminished        Breath Sounds Pre-Tx RML: Diminished        Breath Sounds Pre-Tx RLL: Diminished  Breath Sounds Post Procedure: Breath Sounds Post-Tx KIA: Diminished          Breath Sounds Post-Tx LLL: Diminished          Breath Sounds Post-Tx RUL: Diminished          Breath Sounds Post-Tx RML: Diminished          Breath Sounds Post-Tx RLL: Diminished                                       Heart Rate: Pre procedure Pre-Tx Pulse: 111           Post procedure Post-Tx Pulse: 111    Resp Rate: Pre procedure Pre-Tx Resps: 20           Post procedure Post-Tx Resps: 20      Oxygen: O2 Therapy: Room air        Changed: No    SpO2:  SpO2: 90 %   without Oxygen                Nebulizer Therapy: Current medications Medications: Budesonide/Formoterol      Changed: No        Problem List:   Patient Active Problem List   Diagnosis    COPD exacerbation (HCC)    Pneumonia due to organism    Pleural effusion on right       Respiratory Therapist: Yasmin Kennedy RT

## 2024-11-07 NOTE — PROGRESS NOTES
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   Cont with duonebs  Cont antibiotics- rocephin. S/p azithromycin  Cont steroids- prednisone for total of 5 days  Procal 0.1  Oxygen prn  Cessation counseling given again on arrival to [facility]  WBC 26 -> 18  Rapid Flu and COVID test ng at [facility]     Moderate Right sided pleural effusion POA  Suspect Malignant Pleural Effusion given history of lung cancer stage 4 POA- in remission since  per pt, follows at [facility]  Protein Calorie Malnutrition POA     Pt transferred to [facility] for R Thoracentesis for cytology to rule out metastatic disease.   [facility] at [facility] before transfer to [facility]- if need arises will need IP Pulm consult for [facility] [place] here.  - consulted Pulmonology  - underwent thoracentesis with IR , 250 mL removed, culture is pending     Hypokalemia-   HypoMagnesemia  - replete prn  Cont to replenish Mag PO now     Substance Abuse disorder- cocaine, Alcohol       Medical Decision Making:   I personally reviewed labs: cbc bmp  I personally reviewed imaging: CXR  I personally reviewed EKG:  Toxic drug monitoring: monitor platelets for thrombocytopenia from CTX toxicity  Discussed case with: RN CM    Code Status: Full  DVT Prophylaxis: SQ Lovenox  Baseline: Pt is independent with ADLs    Subjective:     Chief Complaint / Reason for Physician Visit  \"Followup pleural effusion\".  Discussed with RN events  overnight. Feels ok, no complaints.      Objective:     VITALS:   Last 24hrs VS reviewed since prior progress note. Most recent are:  Patient Vitals for the past 24 hrs:   BP Temp Temp src Pulse Resp SpO2   11/07/24 1113 -- -- -- (!) 108 20 97 %   11/07/24 0915 111/74 97.5 °F (36.4 °C) Oral 99 22 97 %   11/07/24 0830 (!) 99/56 -- -- 99 28 91 %   11/07/24 0820 101/62 -- -- (!) 104 26 90 %   11/07/24 0734 -- -- -- (!) 111 20 94 %   11/07/24 0500 -- 98.8 °F (37.1 °C) Oral -- -- --   11/07/24 0443 -- -- -- -- -- 97 %   11/07/24 0431 -- -- -- 87 20 92 %   11/07/24 0415 -- -- -- -- -- 92 %   11/07/24 0404 (!) 120/98 100.4 °F (38 °C) -- 95 -- 90 %   11/06/24 2047 -- -- -- 94 18 99 %   11/06/24 2037 -- -- -- 94 18 99 %   11/06/24 1939 121/83 98.1 °F (36.7 °C) Oral 99 18 92 %   11/06/24 1530 123/80 97.9 °F (36.6 °C) Oral (!) 109 20 92 %         Intake/Output Summary (Last 24 hours) at 11/7/2024 1439  Last data filed at 11/7/2024 1417  Gross per 24 hour   Intake 1360 ml   Output --   Net 1360 ml        I had a face to face encounter and independently examined this patient on 11/7/2024, as outlined below:    PHYSICAL EXAM:  General: Alert, cooperative  EENT:  EOMI. Anicteric sclerae.  Resp:  Decreased breath sounds, no wheezing or rales.   CV:  Regular  rate,  No edema  GI:  Soft, Non distended, Non tender.    Neurologic:  Alert and oriented X 3, normal speech,   Skin:  No rashes.  No jaundice    Reviewed most current lab test results and cultures  YES  Reviewed most current radiology test results   YES  Review and summation of old records today    NO  Reviewed patient's current orders and MAR    YES  PMH/SH reviewed - no change compared to H&P    Procedures: see electronic medical records for all procedures/Xrays and details which were not copied into this note but were reviewed prior to creation of Plan.      LABS:  I reviewed today's most current labs and imaging studies.  Pertinent labs include:  Recent Labs

## 2024-11-08 LAB
ANION GAP SERPL CALC-SCNC: 7 MMOL/L (ref 2–12)
BACTERIA SPEC CULT: NORMAL
BACTERIA SPEC CULT: NORMAL
BASOPHILS # BLD: 0 K/UL (ref 0–0.1)
BASOPHILS NFR BLD: 0 % (ref 0–1)
BUN SERPL-MCNC: 10 MG/DL (ref 6–20)
BUN/CREAT SERPL: 24 (ref 12–20)
CALCIUM SERPL-MCNC: 9.2 MG/DL (ref 8.5–10.1)
CHLORIDE SERPL-SCNC: 101 MMOL/L (ref 97–108)
CO2 SERPL-SCNC: 24 MMOL/L (ref 21–32)
CREAT SERPL-MCNC: 0.42 MG/DL (ref 0.7–1.3)
CYTOLOGY-NON GYN: NORMAL
DIFFERENTIAL METHOD BLD: ABNORMAL
EOSINOPHIL # BLD: 0 K/UL (ref 0–0.4)
EOSINOPHIL NFR BLD: 0 % (ref 0–7)
ERYTHROCYTE [DISTWIDTH] IN BLOOD BY AUTOMATED COUNT: 13 % (ref 11.5–14.5)
GLUCOSE SERPL-MCNC: 120 MG/DL (ref 65–100)
HCT VFR BLD AUTO: 37.4 % (ref 36.6–50.3)
HGB BLD-MCNC: 12.9 G/DL (ref 12.1–17)
IMM GRANULOCYTES # BLD AUTO: 0.2 K/UL (ref 0–0.04)
IMM GRANULOCYTES NFR BLD AUTO: 1 % (ref 0–0.5)
LYMPHOCYTES # BLD: 3.2 K/UL (ref 0.8–3.5)
LYMPHOCYTES NFR BLD: 18 % (ref 12–49)
MAGNESIUM SERPL-MCNC: 1.5 MG/DL (ref 1.6–2.4)
MCH RBC QN AUTO: 31.5 PG (ref 26–34)
MCHC RBC AUTO-ENTMCNC: 34.5 G/DL (ref 30–36.5)
MCV RBC AUTO: 91.4 FL (ref 80–99)
MONOCYTES # BLD: 2 K/UL (ref 0–1)
MONOCYTES NFR BLD: 11 % (ref 5–13)
NEUTS SEG # BLD: 12.6 K/UL (ref 1.8–8)
NEUTS SEG NFR BLD: 70 % (ref 32–75)
NRBC # BLD: 0 K/UL (ref 0–0.01)
NRBC BLD-RTO: 0 PER 100 WBC
PHOSPHATE SERPL-MCNC: 3.4 MG/DL (ref 2.6–4.7)
PLATELET # BLD AUTO: 279 K/UL (ref 150–400)
PMV BLD AUTO: 8.3 FL (ref 8.9–12.9)
POTASSIUM SERPL-SCNC: 3.2 MMOL/L (ref 3.5–5.1)
RBC # BLD AUTO: 4.09 M/UL (ref 4.1–5.7)
RBC MORPH BLD: ABNORMAL
RBC MORPH BLD: ABNORMAL
SERVICE CMNT-IMP: NORMAL
SODIUM SERPL-SCNC: 132 MMOL/L (ref 136–145)
WBC # BLD AUTO: 18 K/UL (ref 4.1–11.1)

## 2024-11-08 PROCEDURE — 6360000002 HC RX W HCPCS: Performed by: INTERNAL MEDICINE

## 2024-11-08 PROCEDURE — 1100000000 HC RM PRIVATE

## 2024-11-08 PROCEDURE — 6370000000 HC RX 637 (ALT 250 FOR IP): Performed by: STUDENT IN AN ORGANIZED HEALTH CARE EDUCATION/TRAINING PROGRAM

## 2024-11-08 PROCEDURE — 85025 COMPLETE CBC W/AUTO DIFF WBC: CPT

## 2024-11-08 PROCEDURE — G0238 OTH RESP PROC, INDIV: HCPCS

## 2024-11-08 PROCEDURE — 6370000000 HC RX 637 (ALT 250 FOR IP): Performed by: INTERNAL MEDICINE

## 2024-11-08 PROCEDURE — 87070 CULTURE OTHR SPECIMN AEROBIC: CPT

## 2024-11-08 PROCEDURE — 87205 SMEAR GRAM STAIN: CPT

## 2024-11-08 PROCEDURE — 36415 COLL VENOUS BLD VENIPUNCTURE: CPT

## 2024-11-08 PROCEDURE — 2500000003 HC RX 250 WO HCPCS: Performed by: STUDENT IN AN ORGANIZED HEALTH CARE EDUCATION/TRAINING PROGRAM

## 2024-11-08 PROCEDURE — 83735 ASSAY OF MAGNESIUM: CPT

## 2024-11-08 PROCEDURE — 2580000003 HC RX 258: Performed by: INTERNAL MEDICINE

## 2024-11-08 PROCEDURE — 94640 AIRWAY INHALATION TREATMENT: CPT

## 2024-11-08 PROCEDURE — 84100 ASSAY OF PHOSPHORUS: CPT

## 2024-11-08 PROCEDURE — 80048 BASIC METABOLIC PNL TOTAL CA: CPT

## 2024-11-08 PROCEDURE — 94760 N-INVAS EAR/PLS OXIMETRY 1: CPT

## 2024-11-08 PROCEDURE — 94010 BREATHING CAPACITY TEST: CPT

## 2024-11-08 PROCEDURE — 6370000000 HC RX 637 (ALT 250 FOR IP): Performed by: NURSE PRACTITIONER

## 2024-11-08 PROCEDURE — 94761 N-INVAS EAR/PLS OXIMETRY MLT: CPT

## 2024-11-08 RX ORDER — MAGNESIUM SULFATE HEPTAHYDRATE 40 MG/ML
2000 INJECTION, SOLUTION INTRAVENOUS ONCE
Status: COMPLETED | OUTPATIENT
Start: 2024-11-08 | End: 2024-11-08

## 2024-11-08 RX ORDER — POTASSIUM CHLORIDE 750 MG/1
40 TABLET, EXTENDED RELEASE ORAL ONCE
Status: COMPLETED | OUTPATIENT
Start: 2024-11-08 | End: 2024-11-08

## 2024-11-08 RX ADMIN — ENOXAPARIN SODIUM 30 MG: 100 INJECTION SUBCUTANEOUS at 10:06

## 2024-11-08 RX ADMIN — Medication 100 MG: at 10:05

## 2024-11-08 RX ADMIN — ACETAMINOPHEN 650 MG: 325 TABLET ORAL at 07:02

## 2024-11-08 RX ADMIN — SODIUM CHLORIDE, PRESERVATIVE FREE 10 ML: 5 INJECTION INTRAVENOUS at 21:17

## 2024-11-08 RX ADMIN — ARFORMOTEROL TARTRATE: 15 SOLUTION RESPIRATORY (INHALATION) at 20:41

## 2024-11-08 RX ADMIN — FAMOTIDINE 20 MG: 20 TABLET, FILM COATED ORAL at 10:05

## 2024-11-08 RX ADMIN — ARFORMOTEROL TARTRATE: 15 SOLUTION RESPIRATORY (INHALATION) at 08:05

## 2024-11-08 RX ADMIN — FOLIC ACID 1 MG: 1 TABLET ORAL at 10:05

## 2024-11-08 RX ADMIN — POTASSIUM CHLORIDE 40 MEQ: 750 TABLET, EXTENDED RELEASE ORAL at 10:12

## 2024-11-08 RX ADMIN — IPRATROPIUM BROMIDE AND ALBUTEROL SULFATE 1 DOSE: .5; 3 SOLUTION RESPIRATORY (INHALATION) at 08:18

## 2024-11-08 RX ADMIN — WATER 1000 MG: 1 INJECTION INTRAMUSCULAR; INTRAVENOUS; SUBCUTANEOUS at 21:17

## 2024-11-08 RX ADMIN — IPRATROPIUM BROMIDE AND ALBUTEROL SULFATE 1 DOSE: .5; 3 SOLUTION RESPIRATORY (INHALATION) at 20:41

## 2024-11-08 RX ADMIN — TAMSULOSIN HYDROCHLORIDE 0.4 MG: 0.4 CAPSULE ORAL at 10:06

## 2024-11-08 RX ADMIN — ACETAMINOPHEN 650 MG: 325 TABLET ORAL at 21:20

## 2024-11-08 RX ADMIN — IPRATROPIUM BROMIDE AND ALBUTEROL SULFATE 1 DOSE: .5; 3 SOLUTION RESPIRATORY (INHALATION) at 16:12

## 2024-11-08 RX ADMIN — THERA TABS 1 TABLET: TAB at 10:06

## 2024-11-08 RX ADMIN — GUAIFENESIN AND DEXTROMETHORPHAN 5 ML: 100; 10 SYRUP ORAL at 21:22

## 2024-11-08 RX ADMIN — MAGNESIUM SULFATE HEPTAHYDRATE 2000 MG: 40 INJECTION, SOLUTION INTRAVENOUS at 10:17

## 2024-11-08 RX ADMIN — PREDNISONE 40 MG: 20 TABLET ORAL at 10:05

## 2024-11-08 RX ADMIN — SODIUM CHLORIDE, PRESERVATIVE FREE 10 ML: 5 INJECTION INTRAVENOUS at 10:06

## 2024-11-08 RX ADMIN — IPRATROPIUM BROMIDE AND ALBUTEROL SULFATE 1 DOSE: .5; 3 SOLUTION RESPIRATORY (INHALATION) at 12:27

## 2024-11-08 RX ADMIN — FAMOTIDINE 20 MG: 20 TABLET, FILM COATED ORAL at 21:17

## 2024-11-08 RX ADMIN — ROSUVASTATIN CALCIUM 20 MG: 20 TABLET, FILM COATED ORAL at 21:17

## 2024-11-08 ASSESSMENT — PAIN SCALES - GENERAL
PAINLEVEL_OUTOF10: 0
PAINLEVEL_OUTOF10: 7
PAINLEVEL_OUTOF10: 8

## 2024-11-08 ASSESSMENT — PAIN DESCRIPTION - DESCRIPTORS
DESCRIPTORS: ACHING
DESCRIPTORS: ACHING

## 2024-11-08 ASSESSMENT — COPD QUESTIONNAIRES
QUESTION2_CHESTPHLEGM: 5
QUESTION4_WALKINCLINE: 4
QUESTION5_HOMEACTIVITIES: 3
QUESTION1_COUGHFREQUENCY: 3
QUESTION8_ENERGYLEVEL: 2
CAT_TOTALSCORE: 27
QUESTION3_CHESTTIGHTNESS: 4
QUESTION6_LEAVINGHOUSE: 3
QUESTION7_SLEEPQUALITY: 3
TOTAL_EXACERBATIONS_PASTYEAR: 3

## 2024-11-08 ASSESSMENT — PAIN DESCRIPTION - ORIENTATION
ORIENTATION: RIGHT
ORIENTATION: RIGHT

## 2024-11-08 ASSESSMENT — PAIN - FUNCTIONAL ASSESSMENT
PAIN_FUNCTIONAL_ASSESSMENT: ACTIVITIES ARE NOT PREVENTED
PAIN_FUNCTIONAL_ASSESSMENT: ACTIVITIES ARE NOT PREVENTED

## 2024-11-08 ASSESSMENT — PAIN DESCRIPTION - LOCATION
LOCATION: RIB CAGE
LOCATION: ABDOMEN;RIB CAGE

## 2024-11-08 NOTE — PROGRESS NOTES
PCP hospital follow-up transitional care appointment has been scheduled with NP Esperanza Alvarez on 11/13/24 1400. [facility] information on AVS for patient resource.   Pending patient discharge

## 2024-11-08 NOTE — PROGRESS NOTES
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   Cont with duonebs  Cont antibiotics- rocephin. S/p azithromycin  Cont steroids- prednisone for total of 5 days  Procal 0.1  Oxygen prn  Cessation counseling given again on arrival to [facility]  WBC 26 -> 18  Rapid Flu and COVID test ng at [facility]  - wean O2, on 2L this morning     Moderate Right sided pleural effusion POA  Suspect Malignant Pleural Effusion given history of lung cancer stage 4 POA- in remission since  per pt, follows at [facility] oncology  Protein Calorie Malnutrition POA     Pt transferred to [facility] for R Thoracentesis for cytology to rule out metastatic disease.   [facility] at [facility] before transfer to [facility]- if need arises will need IP Pulm consult for [facility] [place] here.  - consulted Pulmonology  - underwent thoracentesis with IR , 250 mL removed, culture is pending     Hypokalemia-   HypoMagnesemia  - replete prn  Cont to replenish Mag      Substance Abuse disorder- cocaine, Alcohol  - monitor CIWAs, last drink , likely out of ETOH withdrawal window       Medical Decision Making:   I personally reviewed labs: cbc bmp  I personally reviewed imaging:   I personally reviewed EKG:  Toxic drug monitoring: monitor platelets for thrombocytopenia from CTX toxicity  Discussed case with: RN CM    Code Status: Full  DVT Prophylaxis: SQ Lovenox  Baseline: Pt is independent with ADLs    Subjective:

## 2024-11-08 NOTE — PROGRESS NOTES
Pulmonary Disease Navigator Note  [facility]    Current GOLD classification for Fab Gonzalez    Patient's chart was reviewed by Pulmonary Disease Navigator for compliance with prescribed treatment with Global Initiative For Chronic Obstructive Lung Disease (GOLD).    Please, review beneath recommendations for pharmacological treatment for patient with obstructive lung disease.           Current Pharmacological Treatment:  albuterol sulfate HFA (VENTOLIN HFA) 108 (90 Base) MCG/ACT inhaler  fluticasone-salmeterol (ADVAIR DISKUS) 250-50 MCG/ACT AEPB diskus inhaler  ipratropium 0.5 mg-albuterol 2.5 mg (DUONEB) 0.5-2.5 (3) MG/3ML SOLN nebulizer solution   tiotropium (SPIRIVA) 18 MCG inhalation capsule        GOLD Stage:  Requires PFT documentation  Group:    Current eosinophil count: 0  Recorded domestic exacerbations past 12 months: 3        Observed PIF:  (Incheck Dial:  MDI 110lpm; DPI 40lpm)    Combination  ICS-LABA Inhaler Acceptable   Therapy  Device For Use   Salmeterol/fluticasone Advair  Diskus Yes   Vilanterol/fluticasone  Breo  Ellipta YEs   Formoterol/mometasone  Dulera MDI Yes   Formoterol/budesonide  Symbicort MDI Yes   Triple Therapy Recommended (For Group E) [id number]     Fluticasone/umeclidinium/vilanterol  Trelegy  Ellipta YEs   Budesonide/glycopyrrolate/formoterol fumarate  Breztri  MDI Yes   Recommended (For Group A & B) LAMA/LABA     Vilanterol/umeclidinium  Anoro  Ellipta YEs   Olodaterol/tiotropium  Stiolto  Respimat Yes   Formoterol/glycopyrronium  Bevespi  MDI Yes   Aclidinium/formoterol Duaklir  Pressair      *Nebulizer Options    LAMA LABA ICS   [place] Budesonide    Performist      The CAT provides a reliable measure of the impact of COPD on a patient's health status. Range of CAT scores from 0-40. Higher scores denote a more severe impact of COPD on a patient’s life. Scores <10 have a low impact, 10-20 medium, 21-30 high and >30 very high

## 2024-11-08 NOTE — PROGRESS NOTES
End of Shift Note    Bedside shift change report given to Guillermo MEZA (oncoming nurse) by Gracia Caraballo RN (offgoing nurse).  Report included the following information SBAR REPORTS LIST: SBAR, Kardex, ED Summary, OR Summary, MAR, Accordion, Recent Results, Med Rec Status, and Cardiac Rhythm NSR to Sinus Tachycardia )    Shift worked:  3671-9428     Shift summary and any significant changes:     Pt had a calm night, Ladocaine patch was placed on his rt side where the thoracentesis was done. Site clean and dry with no swelling. Respiratory culture sample collected.  Pt was SOB this morning and he was assessed, SPO2 was 92%.  Pt was put on 1L of O2 for comfort.   Concerns for physician to address:  None     Zone phone for oncoming shift:   [phone]           Length of Stay:  Expected LOS: 3  Actual LOS: 2      Gracia Caraballo RN

## 2024-11-08 NOTE — PLAN OF CARE
Problem: Discharge Planning  Goal: Discharge to home or other facility with appropriate resources  Outcome: Progressing     Problem: Respiratory - Adult  Goal: Achieves optimal ventilation and oxygenation  Outcome: Progressing     Problem: Safety - Adult  Goal: Free from fall injury  Outcome: Progressing     Problem: Pain  Goal: Verbalizes/displays adequate comfort level or baseline comfort level  Outcome: Progressing

## 2024-11-08 NOTE — PROGRESS NOTES
Pulmonary, Critical Care, and Sleep Medicine~Consult Note    Name: Fab Gonzalez MRN: [id number]   : 1960 Hospital: [facility]   Date: 2024 12:07 PM Admission: 2024     Impression Plan   Abnormal chest imaging: right pleural effusion and RLL atelectasis vs. superimposed pneumonia  COPD, not in an acute exacerbation S/p right thoracentesis by IR this AM; follow cytology and cultures  Continue empiric abx for CAP  F/U PNA panel, sputum culture  Continue scheduled duonebs  On prednisone- taper as able         Asked to consult on patient by Dr. Mendel for \"pleural effusion, concern for malignancy, will he need any more imaging vs outpatient follow up?\".    HPI:  64 year old male with a history of Stage 4 Lung cancer in remission, COPD, Cocaine & alcohol abuse who presented to [facility] for SOB. Found to be hypoxic in the ED. He was seen in consultation by Melyssa Mireles at [facility] by Dr. Thomas when a thoracentesis was recommended, so he was transferred to [facility] for the thoracentesis and further management of AECOPD. He tells me he had SOB and a productive cough w/ clear/white sputum, as well as fevers/chills for 2 days. He is on room air during my exam.    11/3 flu and covid-19 negative    24 Chest CTA: no PE. No lymphadenopathy. There is centrilobular emphysema, moderate right pleural effusion with right lower lobe atelectasis vs. superimposed pneumonia.    24 CXR: small right pleural effusion at the lung base, no pneumothorax s/p right thoracentesis by IR.    Interval History:  Afebrile  Bp stable  Sats 94% on 2L NC  Mag 1.5- repleted  WBC 18--slightly decreased  K 3.2- repleted  RVP negative  Blood cultures no growth to date  Pleural fluid pH, exudate    ROS: No complaints this morning. Resting comfortably.  I have reviewed the labs and previous day’s notes.    Pertinent items are noted in HPI.  Past Medical History:   Diagnosis Date    Asthma       Past

## 2024-11-08 NOTE — PROGRESS NOTES
ADULT PROTOCOL: JET AEROSOL ASSESSMENT    Patient  Fab Gonzalez     64 y.o.   male     11/8/2024  9:25 AM    Breath Sounds Pre Procedure: Breath Sounds Pre-Tx KIA: Diminished                                  Breath Sounds Pre-Tx LLL: Diminished        Breath Sounds Pre-Tx RUL: Diminished        Breath Sounds Pre-Tx RML: Diminished        Breath Sounds Pre-Tx RLL: Diminished  Breath Sounds Post Procedure: Breath Sounds Post-Tx KIA: Diminished          Breath Sounds Post-Tx LLL: Diminished          Breath Sounds Post-Tx RUL: Diminished          Breath Sounds Post-Tx RML: Fine crackles          Breath Sounds Post-Tx RLL: Diminished                                       Heart Rate: Pre procedure Pre-Tx Pulse: 90           Post procedure Post-Tx Pulse: 96    Resp Rate: Pre procedure Pre-Tx Resps: 18           Post procedure Post-Tx Resps: 18      Oxygen: O2 Therapy: Room air   nasal cannula     Changed: No    SpO2:  SpO2: 94 %   without Oxygen                Nebulizer Therapy: Current medications Medications: Albuterol/Ipratropium      Changed: No    Comments:     Problem List:   Patient Active Problem List   Diagnosis    COPD exacerbation (HCC)    Pneumonia due to organism    Pleural effusion on right       Respiratory Therapist: Abigail Lazcano RCP

## 2024-11-08 NOTE — PROGRESS NOTES
End of Shift Note    Bedside shift change report given to SUSANA Fagan (oncoming nurse) by Guillermo Eli RN (offgoing nurse).  Report included the following information SBAR REPORTS LIST: SBAR, Kardex, ED Summary, Intake/Output, MAR, Accordion, Recent Results, Med Rec Status, Cardiac Rhythm NSR-NST, Alarm Parameters , and Quality Measures    Shift worked:  7a-7p     Shift summary and any significant changes:     Patient observed to be short of breath on exertion with O2 sat in the low 90's. 1L O2 applied for comfort. Thoracentesis site clean, dry and intact. Patient denies pain during shift. Patient awake, alert, and oriented during beside shift change report.     Concerns for physician to address:  Current plan adequate.     Zone phone for oncoming shift:   [phone]       Activity:  Activity: Out of bed with assistance  Number times ambulated in hallways past shift: 0  Number of times OOB to chair past shift: 1    Cardiac:   Cardiac Monitoring: YES / NO: Yes        Access:  Current line(s): IV ACCESS: - PIV    Genitourinary:   Urinary status: Urinary status: Patient is voiding without difficulty.    Respiratory:   oxygen delivery: room air  Chronic home O2 use?: YES / NO: No  Incentive spirometer at bedside: YES / NO: No      GI:  Current diet:  DIET: regular  Passing flatus: YES / NO: Yes  Tolerating current diet: YES / NO: Yes      Pain Management:   Patient states pain is manageable on current regimen: YES / NO: Yes    Skin:    Interventions: HARRIETT SCALE: 20    Patient Safety:  Fall Score: FALL RISK ASSESSMENT: At risk due to:  weakness  Interventions: Fall Interventions Provided: Implemented/recommended use of non-skid footwear, Implemented/recommended use of fall risk identification flag to all team members, Implemented/recommended assistive devices and encouraged their use, Implemented/recommended resources for alarm system (personal alarm, bed alarm, call bell, etc.) , Implemented/recommended environmental  changes (remove hazards, lower bed, improve lighting, etc.), and Implemented/recommended increased supervision/assistance      Length of Stay:  Expected LOS: 3  Actual LOS: 2      Guillermo Eli RN

## 2024-11-09 LAB
ANION GAP SERPL CALC-SCNC: 9 MMOL/L (ref 2–12)
BASOPHILS # BLD: 0 K/UL (ref 0–0.1)
BASOPHILS NFR BLD: 0 % (ref 0–1)
BUN SERPL-MCNC: 13 MG/DL (ref 6–20)
BUN/CREAT SERPL: 21 (ref 12–20)
CALCIUM SERPL-MCNC: 9.3 MG/DL (ref 8.5–10.1)
CHLORIDE SERPL-SCNC: 104 MMOL/L (ref 97–108)
CO2 SERPL-SCNC: 22 MMOL/L (ref 21–32)
CREAT SERPL-MCNC: 0.61 MG/DL (ref 0.7–1.3)
DIFFERENTIAL METHOD BLD: ABNORMAL
EOSINOPHIL # BLD: 0.2 K/UL (ref 0–0.4)
EOSINOPHIL NFR BLD: 1 % (ref 0–7)
ERYTHROCYTE [DISTWIDTH] IN BLOOD BY AUTOMATED COUNT: 13.2 % (ref 11.5–14.5)
GLUCOSE SERPL-MCNC: 179 MG/DL (ref 65–100)
HCT VFR BLD AUTO: 39.6 % (ref 36.6–50.3)
HGB BLD-MCNC: 13.4 G/DL (ref 12.1–17)
IMM GRANULOCYTES # BLD AUTO: 0 K/UL (ref 0–0.04)
IMM GRANULOCYTES NFR BLD AUTO: 0 % (ref 0–0.5)
LYMPHOCYTES # BLD: 3.7 K/UL (ref 0.8–3.5)
LYMPHOCYTES NFR BLD: 15 % (ref 12–49)
MAGNESIUM SERPL-MCNC: 1.8 MG/DL (ref 1.6–2.4)
MCH RBC QN AUTO: 31.7 PG (ref 26–34)
MCHC RBC AUTO-ENTMCNC: 33.8 G/DL (ref 30–36.5)
MCV RBC AUTO: 93.6 FL (ref 80–99)
MONOCYTES # BLD: 1.7 K/UL (ref 0–1)
MONOCYTES NFR BLD: 7 % (ref 5–13)
MYELOCYTES NFR BLD MANUAL: 1 %
NEUTS SEG # BLD: 18.7 K/UL (ref 1.8–8)
NEUTS SEG NFR BLD: 76 % (ref 32–75)
NRBC # BLD: 0 K/UL (ref 0–0.01)
NRBC BLD-RTO: 0 PER 100 WBC
PHOSPHATE SERPL-MCNC: 3.4 MG/DL (ref 2.6–4.7)
PLATELET # BLD AUTO: 307 K/UL (ref 150–400)
PMV BLD AUTO: 8.6 FL (ref 8.9–12.9)
POTASSIUM SERPL-SCNC: 3.8 MMOL/L (ref 3.5–5.1)
RBC # BLD AUTO: 4.23 M/UL (ref 4.1–5.7)
RBC MORPH BLD: ABNORMAL
SODIUM SERPL-SCNC: 135 MMOL/L (ref 136–145)
WBC # BLD AUTO: 24.6 K/UL (ref 4.1–11.1)

## 2024-11-09 PROCEDURE — 2700000000 HC OXYGEN THERAPY PER DAY

## 2024-11-09 PROCEDURE — 6360000002 HC RX W HCPCS: Performed by: INTERNAL MEDICINE

## 2024-11-09 PROCEDURE — 80048 BASIC METABOLIC PNL TOTAL CA: CPT

## 2024-11-09 PROCEDURE — 94640 AIRWAY INHALATION TREATMENT: CPT

## 2024-11-09 PROCEDURE — 1100000000 HC RM PRIVATE

## 2024-11-09 PROCEDURE — 6370000000 HC RX 637 (ALT 250 FOR IP): Performed by: INTERNAL MEDICINE

## 2024-11-09 PROCEDURE — 6370000000 HC RX 637 (ALT 250 FOR IP): Performed by: STUDENT IN AN ORGANIZED HEALTH CARE EDUCATION/TRAINING PROGRAM

## 2024-11-09 PROCEDURE — 94761 N-INVAS EAR/PLS OXIMETRY MLT: CPT

## 2024-11-09 PROCEDURE — 83735 ASSAY OF MAGNESIUM: CPT

## 2024-11-09 PROCEDURE — 84100 ASSAY OF PHOSPHORUS: CPT

## 2024-11-09 PROCEDURE — 36415 COLL VENOUS BLD VENIPUNCTURE: CPT

## 2024-11-09 PROCEDURE — 85025 COMPLETE CBC W/AUTO DIFF WBC: CPT

## 2024-11-09 PROCEDURE — 2580000003 HC RX 258: Performed by: INTERNAL MEDICINE

## 2024-11-09 RX ADMIN — FAMOTIDINE 20 MG: 20 TABLET, FILM COATED ORAL at 09:29

## 2024-11-09 RX ADMIN — ROSUVASTATIN CALCIUM 20 MG: 20 TABLET, FILM COATED ORAL at 20:59

## 2024-11-09 RX ADMIN — Medication 100 MG: at 09:29

## 2024-11-09 RX ADMIN — TAMSULOSIN HYDROCHLORIDE 0.4 MG: 0.4 CAPSULE ORAL at 09:29

## 2024-11-09 RX ADMIN — ARFORMOTEROL TARTRATE: 15 SOLUTION RESPIRATORY (INHALATION) at 07:45

## 2024-11-09 RX ADMIN — THERA TABS 1 TABLET: TAB at 09:29

## 2024-11-09 RX ADMIN — SODIUM CHLORIDE, PRESERVATIVE FREE 10 ML: 5 INJECTION INTRAVENOUS at 21:05

## 2024-11-09 RX ADMIN — FOLIC ACID 1 MG: 1 TABLET ORAL at 09:29

## 2024-11-09 RX ADMIN — WATER 1000 MG: 1 INJECTION INTRAMUSCULAR; INTRAVENOUS; SUBCUTANEOUS at 21:00

## 2024-11-09 RX ADMIN — IPRATROPIUM BROMIDE AND ALBUTEROL SULFATE 1 DOSE: .5; 3 SOLUTION RESPIRATORY (INHALATION) at 07:54

## 2024-11-09 RX ADMIN — PREDNISONE 40 MG: 20 TABLET ORAL at 09:29

## 2024-11-09 RX ADMIN — FAMOTIDINE 20 MG: 20 TABLET, FILM COATED ORAL at 21:00

## 2024-11-09 RX ADMIN — ACETAMINOPHEN 650 MG: 325 TABLET ORAL at 21:00

## 2024-11-09 RX ADMIN — SODIUM CHLORIDE, PRESERVATIVE FREE 10 ML: 5 INJECTION INTRAVENOUS at 09:29

## 2024-11-09 RX ADMIN — IPRATROPIUM BROMIDE AND ALBUTEROL SULFATE 1 DOSE: .5; 3 SOLUTION RESPIRATORY (INHALATION) at 21:01

## 2024-11-09 RX ADMIN — ENOXAPARIN SODIUM 30 MG: 100 INJECTION SUBCUTANEOUS at 09:29

## 2024-11-09 RX ADMIN — ARFORMOTEROL TARTRATE: 15 SOLUTION RESPIRATORY (INHALATION) at 21:01

## 2024-11-09 RX ADMIN — IPRATROPIUM BROMIDE AND ALBUTEROL SULFATE 1 DOSE: .5; 3 SOLUTION RESPIRATORY (INHALATION) at 15:46

## 2024-11-09 ASSESSMENT — PAIN SCALES - GENERAL: PAINLEVEL_OUTOF10: 4

## 2024-11-09 NOTE — PROGRESS NOTES
Review of pleural fluid c/w exudative process.  Given ldh, most concerning for infection.  Continue abx and would not discharge

## 2024-11-09 NOTE — PROGRESS NOTES
Hospitalist Progress Note    NAME:   Fab Gonzalez   : 1960   MRN: [id number]     Date/Time: 2024 2:27 PM  Patient PCP: Esperanza Diamond APRN - NP    Estimated discharge date:   Barriers: thoracentesis studies, respiratory stability, Pulm clearance      Assessment / Plan:    Acute COPD exacerbation POA- pt off oxygen now, was transiently hypoxic at [facility] on arrival in ER  Centrilobular Emphysema POA  Cannot rule out underlying PNA- Bibasilar ASD  Decreased breath sounds bilaterally  History of cocaine abuse as well.   Cont with duonebs  Cont antibiotics- rocephin. S/p azithromycin  Cont steroids- prednisone for total of 5 days  Procal 0.1  Oxygen prn  Cessation counseling given again on arrival to [facility]  WBC 26 -> 18  Rapid Flu and COVID test ng at [facility]  - wean O2, remains on 2L     Moderate Right sided pleural effusion POA  Suspect Malignant Pleural Effusion given history of lung cancer stage 4 POA- in remission since  per pt, follows at [facility] oncology  Protein Calorie Malnutrition POA     Pt transferred to [facility] for R Thoracentesis for cytology to rule out metastatic disease.   Pt was being [facility] by [facility] via Tele Pulmonology consultation at [facility] before transfer to [facility]- if need arises will need IP Pulm consult for Pulm Nicolas here.  - consulted Pulmonology  - underwent thoracentesis with IR , 250 mL removed, culture is pending  - discussed with Pulm , concerning for infection, not clear for discharge as of yet, continue abx     Hypokalemia-   HypoMagnesemia  - replete prn  Cont to replenish Mag      Substance Abuse disorder- cocaine, Alcohol  - monitor CIWAs, last drink , likely out of ETOH withdrawal window       Medical Decision Making:   I personally reviewed labs: cbc bmp  I personally reviewed imaging:   I personally reviewed EKG:  Toxic drug monitoring: monitor platelets for thrombocytopenia from CTX toxicity  Discussed case with: SUSANA Pulm    Code  Status: Full  DVT Prophylaxis: SQ Lovenox  Baseline: Pt is independent with ADLs    Subjective:     Chief Complaint / Reason for Physician Visit  \"Followup pleural effusion\".  Discussed with RN events overnight. Feels ok.      Objective:     VITALS:   Last 24hrs VS reviewed since prior progress note. Most recent are:  Patient Vitals for the past 24 hrs:   BP Temp Temp src Pulse Resp SpO2   11/09/24 0757 -- -- -- -- -- 92 %   11/09/24 0750 104/77 97.7 °F (36.5 °C) Oral 86 20 94 %   11/09/24 0416 107/76 97.5 °F (36.4 °C) Oral 94 18 95 %   11/08/24 2114 124/82 98.1 °F (36.7 °C) Oral (!) 110 18 95 %   11/08/24 2036 -- -- -- 95 20 100 %   11/08/24 2019 -- -- -- -- 20 100 %   11/08/24 1452 101/72 98.1 °F (36.7 °C) Oral 95 19 96 %         Intake/Output Summary (Last 24 hours) at 11/9/2024 1427  Last data filed at 11/9/2024 1010  Gross per 24 hour   Intake 820 ml   Output 600 ml   Net 220 ml        I had a face to face encounter and independently examined this patient on 11/9/2024, as outlined below:    PHYSICAL EXAM:  General: Alert, cooperative  EENT:  EOMI. Anicteric sclerae.  Resp:  Decreased breath sounds, no wheezing or rales.   CV:  Regular  rate,  No edema  GI:  Soft, Non distended, Non tender.    Neurologic:  Alert and oriented X 3, normal speech,   Skin:  No rashes.  No jaundice    Reviewed most current lab test results and cultures  YES  Reviewed most current radiology test results   YES  Review and summation of old records today    NO  Reviewed patient's current orders and MAR    YES  PMH/SH reviewed - no change compared to H&P    Procedures: see electronic medical records for all procedures/Xrays and details which were not copied into this note but were reviewed prior to creation of Plan.      LABS:  I reviewed today's most current labs and imaging studies.  Pertinent labs include:  Recent Labs     11/07/24  0406 11/08/24  0316 11/09/24  0355   WBC 18.7* 18.0* 24.6*   HGB 14.0 12.9 13.4   HCT 40.1 37.4 39.6

## 2024-11-09 NOTE — PROGRESS NOTES
End of Shift Note    Bedside shift change report given to Guillermo MEZA (oncoming nurse) by Gracia Caraballo RN (offgoing nurse).  Report included the following information SBAR REPORTS LIST: Kardex, ED Summary, OR Summary, Procedure Summary, Intake/Output, MAR, Recent Results, Med Rec Status, and Cardiac Rhythm (NSR to sinus Tachy)    Shift worked:  4019-5681     Shift summary and any significant changes:     Pt had a calm night, pain control done and he was made comfortable.   No resp distress seen. Pt is still on the O2 2L for comfort.   Concerns for physician to address:  None     Zone phone for oncoming shift:   [phone]           Length of Stay:  Expected LOS: 3  Actual LOS: 2      Gracia Caraballo RN

## 2024-11-09 NOTE — PROGRESS NOTES
End of Shift Note    Bedside shift change report given to SUSANA Barnhart (oncoming nurse) by Guillermo Eli RN (offgoing nurse).  Report included the following information SBAR REPORTS LIST: SBAR, Kardex, ED Summary, Procedure Summary, Intake/Output, MAR, Recent Results, Med Rec Status, Cardiac Rhythm NSR, Alarm Parameters , and Quality Measures    Shift worked:  7a-7p     Shift summary and any significant changes:     Patient on 2L of O2 during exertion. Input and output adequate during shift. Patient denies pain. Incentive spirometer and OOB activities encouraged during shift. No other significant complaint from patient at shift change.      Concerns for physician to address:  None     Zone phone for oncoming shift:   [phone]       Activity:  Activity: Out of bed with assistance  Number times ambulated in hallways past shift: 0  Number of times OOB to chair past shift: 3    Cardiac:   Cardiac Monitoring: YES / NO: No        Access:  Current line(s): IV ACCESS: - PIV    Genitourinary:   Urinary status: Urinary status: Patient is voiding without difficulty.    Respiratory:   oxygen delivery: 2 liters/min via nasal cannula  Chronic home O2 use?: YES / NO: No  Incentive spirometer at bedside: YES / NO: Yes      GI:  Current diet:  DIET: regular  Passing flatus: YES / NO: Yes  Tolerating current diet: YES / NO: Yes      Pain Management:   Patient states pain is manageable on current regimen: YES / NO: Yes    Skin:    Interventions: HARRIETT SCALE: 20    Patient Safety:  Fall Score: FALL RISK ASSESSMENT: At risk due to:  weakness  Interventions: Fall Interventions Provided: Implemented/recommended use of non-skid footwear, Implemented/recommended use of fall risk identification flag to all team members, Implemented/recommended assistive devices and encouraged their use, Implemented/recommended resources for alarm system (personal alarm, bed alarm, call bell, etc.) , Implemented/recommended environmental changes (remove

## 2024-11-09 NOTE — PROGRESS NOTES
ADULT PROTOCOL: JET AEROSOL ASSESSMENT    Patient  Fab Gonzalez     64 y.o.   male     11/9/2024  8:23 AM    Breath Sounds Pre Procedure: Breath Sounds Pre-Tx KIA: Diminished                                  Breath Sounds Pre-Tx LLL: Diminished        Breath Sounds Pre-Tx RUL: Diminished        Breath Sounds Pre-Tx RML: Diminished        Breath Sounds Pre-Tx RLL: Diminished  Breath Sounds Post Procedure: Breath Sounds Post-Tx KIA: Diminished          Breath Sounds Post-Tx LLL: Diminished          Breath Sounds Post-Tx RUL: Diminished          Breath Sounds Post-Tx RML: Diminished          Breath Sounds Post-Tx RLL: Diminished                                       Heart Rate: Pre procedure Pre-Tx Pulse: 88           Post procedure Post-Tx Pulse: 91    Resp Rate: Pre procedure Pre-Tx Resps: 18           Post procedure Post-Tx Resps: 18      Oxygen: O2 Therapy: Oxygen   nasal cannula     Changed: No    SpO2:  SpO2: 92 %   with Oxygen                Nebulizer Therapy: Current medications Medications: Albuterol/Ipratropium      Changed: No    Comments:     Problem List:   Patient Active Problem List   Diagnosis    COPD exacerbation (HCC)    Pneumonia due to organism    Pleural effusion on right       Respiratory Therapist: Abigail Lazcano RCP

## 2024-11-10 ENCOUNTER — APPOINTMENT (OUTPATIENT)
Facility: HOSPITAL | Age: 64
End: 2024-11-10
Attending: INTERNAL MEDICINE
Payer: MEDICAID

## 2024-11-10 LAB
ANION GAP SERPL CALC-SCNC: 6 MMOL/L (ref 2–12)
BACTERIA SPEC CULT: NORMAL
BASOPHILS # BLD: 0 K/UL (ref 0–0.1)
BASOPHILS NFR BLD: 0 % (ref 0–1)
BUN SERPL-MCNC: 10 MG/DL (ref 6–20)
BUN/CREAT SERPL: 20 (ref 12–20)
CALCIUM SERPL-MCNC: 9.5 MG/DL (ref 8.5–10.1)
CHLORIDE SERPL-SCNC: 104 MMOL/L (ref 97–108)
CO2 SERPL-SCNC: 24 MMOL/L (ref 21–32)
CREAT SERPL-MCNC: 0.49 MG/DL (ref 0.7–1.3)
DIFFERENTIAL METHOD BLD: ABNORMAL
EOSINOPHIL # BLD: 0 K/UL (ref 0–0.4)
EOSINOPHIL NFR BLD: 0 % (ref 0–7)
ERYTHROCYTE [DISTWIDTH] IN BLOOD BY AUTOMATED COUNT: 13.2 % (ref 11.5–14.5)
GLUCOSE SERPL-MCNC: 100 MG/DL (ref 65–100)
GRAM STN SPEC: NORMAL
HCT VFR BLD AUTO: 37.5 % (ref 36.6–50.3)
HGB BLD-MCNC: 12.6 G/DL (ref 12.1–17)
IMM GRANULOCYTES # BLD AUTO: 0 K/UL (ref 0–0.04)
IMM GRANULOCYTES NFR BLD AUTO: 0 % (ref 0–0.5)
L PNEUMO1 AG UR QL IA: NEGATIVE
LYMPHOCYTES # BLD: 5.3 K/UL (ref 0.8–3.5)
LYMPHOCYTES NFR BLD: 31 % (ref 12–49)
MAGNESIUM SERPL-MCNC: 1.5 MG/DL (ref 1.6–2.4)
MCH RBC QN AUTO: 31.5 PG (ref 26–34)
MCHC RBC AUTO-ENTMCNC: 33.6 G/DL (ref 30–36.5)
MCV RBC AUTO: 93.8 FL (ref 80–99)
METAMYELOCYTES NFR BLD MANUAL: 1 %
MONOCYTES # BLD: 0.2 K/UL (ref 0–1)
MONOCYTES NFR BLD: 1 % (ref 5–13)
NEUTS SEG # BLD: 11.4 K/UL (ref 1.8–8)
NEUTS SEG NFR BLD: 67 % (ref 32–75)
NRBC # BLD: 0 K/UL (ref 0–0.01)
NRBC BLD-RTO: 0 PER 100 WBC
PHOSPHATE SERPL-MCNC: 4.1 MG/DL (ref 2.6–4.7)
PLATELET # BLD AUTO: 308 K/UL (ref 150–400)
PMV BLD AUTO: 8.3 FL (ref 8.9–12.9)
POTASSIUM SERPL-SCNC: 3.5 MMOL/L (ref 3.5–5.1)
RBC # BLD AUTO: 4 M/UL (ref 4.1–5.7)
RBC MORPH BLD: ABNORMAL
SERVICE CMNT-IMP: NORMAL
SODIUM SERPL-SCNC: 134 MMOL/L (ref 136–145)
SPECIMEN SOURCE: NORMAL
WBC # BLD AUTO: 17 K/UL (ref 4.1–11.1)

## 2024-11-10 PROCEDURE — 94761 N-INVAS EAR/PLS OXIMETRY MLT: CPT

## 2024-11-10 PROCEDURE — 1100000000 HC RM PRIVATE

## 2024-11-10 PROCEDURE — 83735 ASSAY OF MAGNESIUM: CPT

## 2024-11-10 PROCEDURE — 71045 X-RAY EXAM CHEST 1 VIEW: CPT

## 2024-11-10 PROCEDURE — 85025 COMPLETE CBC W/AUTO DIFF WBC: CPT

## 2024-11-10 PROCEDURE — 6370000000 HC RX 637 (ALT 250 FOR IP): Performed by: INTERNAL MEDICINE

## 2024-11-10 PROCEDURE — 94640 AIRWAY INHALATION TREATMENT: CPT

## 2024-11-10 PROCEDURE — 80048 BASIC METABOLIC PNL TOTAL CA: CPT

## 2024-11-10 PROCEDURE — 6360000002 HC RX W HCPCS: Performed by: INTERNAL MEDICINE

## 2024-11-10 PROCEDURE — 2580000003 HC RX 258: Performed by: INTERNAL MEDICINE

## 2024-11-10 PROCEDURE — 36415 COLL VENOUS BLD VENIPUNCTURE: CPT

## 2024-11-10 PROCEDURE — 2700000000 HC OXYGEN THERAPY PER DAY

## 2024-11-10 PROCEDURE — 2500000003 HC RX 250 WO HCPCS: Performed by: STUDENT IN AN ORGANIZED HEALTH CARE EDUCATION/TRAINING PROGRAM

## 2024-11-10 PROCEDURE — 6370000000 HC RX 637 (ALT 250 FOR IP): Performed by: STUDENT IN AN ORGANIZED HEALTH CARE EDUCATION/TRAINING PROGRAM

## 2024-11-10 PROCEDURE — 84100 ASSAY OF PHOSPHORUS: CPT

## 2024-11-10 RX ORDER — MAGNESIUM SULFATE HEPTAHYDRATE 40 MG/ML
2000 INJECTION, SOLUTION INTRAVENOUS ONCE
Status: COMPLETED | OUTPATIENT
Start: 2024-11-10 | End: 2024-11-10

## 2024-11-10 RX ADMIN — Medication 100 MG: at 08:36

## 2024-11-10 RX ADMIN — IPRATROPIUM BROMIDE AND ALBUTEROL SULFATE 1 DOSE: .5; 3 SOLUTION RESPIRATORY (INHALATION) at 20:42

## 2024-11-10 RX ADMIN — PREDNISONE 40 MG: 20 TABLET ORAL at 08:36

## 2024-11-10 RX ADMIN — FAMOTIDINE 20 MG: 20 TABLET, FILM COATED ORAL at 21:20

## 2024-11-10 RX ADMIN — ARFORMOTEROL TARTRATE: 15 SOLUTION RESPIRATORY (INHALATION) at 20:42

## 2024-11-10 RX ADMIN — WATER 1000 MG: 1 INJECTION INTRAMUSCULAR; INTRAVENOUS; SUBCUTANEOUS at 21:21

## 2024-11-10 RX ADMIN — MAGNESIUM SULFATE HEPTAHYDRATE 2000 MG: 40 INJECTION, SOLUTION INTRAVENOUS at 09:33

## 2024-11-10 RX ADMIN — ARFORMOTEROL TARTRATE: 15 SOLUTION RESPIRATORY (INHALATION) at 08:05

## 2024-11-10 RX ADMIN — IPRATROPIUM BROMIDE AND ALBUTEROL SULFATE 1 DOSE: .5; 3 SOLUTION RESPIRATORY (INHALATION) at 11:34

## 2024-11-10 RX ADMIN — IPRATROPIUM BROMIDE AND ALBUTEROL SULFATE 1 DOSE: .5; 3 SOLUTION RESPIRATORY (INHALATION) at 08:13

## 2024-11-10 RX ADMIN — IPRATROPIUM BROMIDE AND ALBUTEROL SULFATE 1 DOSE: .5; 3 SOLUTION RESPIRATORY (INHALATION) at 15:48

## 2024-11-10 RX ADMIN — TAMSULOSIN HYDROCHLORIDE 0.4 MG: 0.4 CAPSULE ORAL at 08:36

## 2024-11-10 RX ADMIN — SODIUM CHLORIDE, PRESERVATIVE FREE 10 ML: 5 INJECTION INTRAVENOUS at 21:32

## 2024-11-10 RX ADMIN — THERA TABS 1 TABLET: TAB at 08:36

## 2024-11-10 RX ADMIN — ENOXAPARIN SODIUM 30 MG: 100 INJECTION SUBCUTANEOUS at 08:36

## 2024-11-10 RX ADMIN — FAMOTIDINE 20 MG: 20 TABLET, FILM COATED ORAL at 08:36

## 2024-11-10 RX ADMIN — FOLIC ACID 1 MG: 1 TABLET ORAL at 08:36

## 2024-11-10 RX ADMIN — GUAIFENESIN AND DEXTROMETHORPHAN 5 ML: 100; 10 SYRUP ORAL at 21:21

## 2024-11-10 RX ADMIN — ROSUVASTATIN CALCIUM 20 MG: 20 TABLET, FILM COATED ORAL at 21:19

## 2024-11-10 ASSESSMENT — PAIN SCALES - GENERAL
PAINLEVEL_OUTOF10: 0

## 2024-11-10 NOTE — PROGRESS NOTES
include:  Recent Labs     11/08/24 0316 11/09/24  0355 11/10/24  0546   WBC 18.0* 24.6* 17.0*   HGB 12.9 13.4 12.6   HCT 37.4 39.6 37.5    307 308     Recent Labs     11/08/24 0316 11/09/24  0355 11/10/24  0546   * 135* 134*   K 3.2* 3.8 3.5    104 104   CO2 24 22 24   GLUCOSE 120* 179* 100   BUN 10 13 10   CREATININE 0.42* 0.61* 0.49*   CALCIUM 9.2 9.3 9.5   MG 1.5* 1.8 1.5*   PHOS 3.4 3.4 4.1       Signed: David L Mendel, MD

## 2024-11-10 NOTE — PLAN OF CARE
Problem: Discharge Planning  Goal: Discharge to home or other facility with appropriate resources  Outcome: Progressing     Problem: Respiratory - Adult  Goal: Achieves optimal ventilation and oxygenation  11/9/2024 1947 by Obey Doyle RN  Outcome: Progressing  11/9/2024 0757 by Abigail Lazcano RCP  Outcome: Progressing     Problem: Safety - Adult  Goal: Free from fall injury  Outcome: Progressing     Problem: Pain  Goal: Verbalizes/displays adequate comfort level or baseline comfort level  Outcome: Progressing

## 2024-11-10 NOTE — PROGRESS NOTES
End of Shift Note    Bedside shift change report given to SUSANA Mace (oncoming nurse) by Obey Doyle RN (offgoing nurse).  Report included the following information SBAR REPORTS LIST: SBAR, Kardex, MAR, Recent Results, and Quality Measures    Shift worked:  NIGHT     Shift summary and any significant changes:     Pt had a calm night.No respiratory distress noted Oncology 2 l of oxygen   Call bell within reach.       Concerns for physician to address:  None     Zone phone for oncoming shift:              Length of Stay:  Expected LOS: 3  Actual LOS: 4      Obey Doyle RN

## 2024-11-10 NOTE — PROGRESS NOTES
ADULT PROTOCOL: JET AEROSOL ASSESSMENT    Patient  Fab Gonzalez     64 y.o.   male     11/10/2024  8:36 AM    Breath Sounds Pre Procedure: Breath Sounds Pre-Tx KIA: Diminished                                  Breath Sounds Pre-Tx LLL: Diminished        Breath Sounds Pre-Tx RUL: Diminished        Breath Sounds Pre-Tx RML: Diminished        Breath Sounds Pre-Tx RLL: Diminished  Breath Sounds Post Procedure: Breath Sounds Post-Tx KIA: Diminished          Breath Sounds Post-Tx LLL: Diminished          Breath Sounds Post-Tx RUL: Diminished          Breath Sounds Post-Tx RML: Diminished          Breath Sounds Post-Tx RLL: Diminished                                       Heart Rate: Pre procedure Pre-Tx Pulse: 86           Post procedure Post-Tx Pulse: 84    Resp Rate: Pre procedure Pre-Tx Resps: 18           Post procedure Post-Tx Resps: 18      Oxygen: O2 Therapy: Oxygen   nasal cannula     Changed: No    SpO2:  SpO2: 94 %   with Oxygen                Nebulizer Therapy: Current medications Medications: Albuterol/Ipratropium      Changed: No    Comments:     Problem List:   Patient Active Problem List   Diagnosis    COPD exacerbation (HCC)    Pneumonia due to organism    Pleural effusion on right       Respiratory Therapist: Abigail Lazcano RCP

## 2024-11-11 ENCOUNTER — APPOINTMENT (OUTPATIENT)
Facility: HOSPITAL | Age: 64
End: 2024-11-11
Attending: INTERNAL MEDICINE
Payer: MEDICAID

## 2024-11-11 LAB
ANION GAP SERPL CALC-SCNC: 6 MMOL/L (ref 2–12)
BACTERIA SPEC CULT: NORMAL
BASOPHILS # BLD: 0 K/UL (ref 0–0.1)
BASOPHILS NFR BLD: 0 % (ref 0–1)
BUN SERPL-MCNC: 13 MG/DL (ref 6–20)
BUN/CREAT SERPL: 25 (ref 12–20)
CALCIUM SERPL-MCNC: 8.9 MG/DL (ref 8.5–10.1)
CHLORIDE SERPL-SCNC: 101 MMOL/L (ref 97–108)
CO2 SERPL-SCNC: 25 MMOL/L (ref 21–32)
CREAT SERPL-MCNC: 0.52 MG/DL (ref 0.7–1.3)
DIFFERENTIAL METHOD BLD: ABNORMAL
EOSINOPHIL # BLD: 0.2 K/UL (ref 0–0.4)
EOSINOPHIL NFR BLD: 1 % (ref 0–7)
ERYTHROCYTE [DISTWIDTH] IN BLOOD BY AUTOMATED COUNT: 13 % (ref 11.5–14.5)
GLUCOSE SERPL-MCNC: 137 MG/DL (ref 65–100)
GRAM STN SPEC: NORMAL
GRAM STN SPEC: NORMAL
HCT VFR BLD AUTO: 34.2 % (ref 36.6–50.3)
HGB BLD-MCNC: 11.8 G/DL (ref 12.1–17)
IMM GRANULOCYTES # BLD AUTO: 0 K/UL (ref 0–0.04)
IMM GRANULOCYTES NFR BLD AUTO: 0 % (ref 0–0.5)
LYMPHOCYTES # BLD: 3.5 K/UL (ref 0.8–3.5)
LYMPHOCYTES NFR BLD: 22 % (ref 12–49)
MAGNESIUM SERPL-MCNC: 1.6 MG/DL (ref 1.6–2.4)
MCH RBC QN AUTO: 31.6 PG (ref 26–34)
MCHC RBC AUTO-ENTMCNC: 34.5 G/DL (ref 30–36.5)
MCV RBC AUTO: 91.4 FL (ref 80–99)
MONOCYTES # BLD: 1.4 K/UL (ref 0–1)
MONOCYTES NFR BLD: 9 % (ref 5–13)
NEUTS SEG # BLD: 10.7 K/UL (ref 1.8–8)
NEUTS SEG NFR BLD: 68 % (ref 32–75)
NRBC # BLD: 0 K/UL (ref 0–0.01)
NRBC BLD-RTO: 0 PER 100 WBC
PHOSPHATE SERPL-MCNC: 3.3 MG/DL (ref 2.6–4.7)
PLATELET # BLD AUTO: 294 K/UL (ref 150–400)
PMV BLD AUTO: 8.4 FL (ref 8.9–12.9)
POTASSIUM SERPL-SCNC: 3.5 MMOL/L (ref 3.5–5.1)
RBC # BLD AUTO: 3.74 M/UL (ref 4.1–5.7)
RBC MORPH BLD: ABNORMAL
SERVICE CMNT-IMP: NORMAL
SODIUM SERPL-SCNC: 132 MMOL/L (ref 136–145)
WBC # BLD AUTO: 15.8 K/UL (ref 4.1–11.1)

## 2024-11-11 PROCEDURE — 97165 OT EVAL LOW COMPLEX 30 MIN: CPT

## 2024-11-11 PROCEDURE — 94761 N-INVAS EAR/PLS OXIMETRY MLT: CPT

## 2024-11-11 PROCEDURE — 97161 PT EVAL LOW COMPLEX 20 MIN: CPT

## 2024-11-11 PROCEDURE — 6360000002 HC RX W HCPCS: Performed by: INTERNAL MEDICINE

## 2024-11-11 PROCEDURE — 2700000000 HC OXYGEN THERAPY PER DAY

## 2024-11-11 PROCEDURE — 71250 CT THORAX DX C-: CPT

## 2024-11-11 PROCEDURE — 84100 ASSAY OF PHOSPHORUS: CPT

## 2024-11-11 PROCEDURE — 2580000003 HC RX 258: Performed by: INTERNAL MEDICINE

## 2024-11-11 PROCEDURE — 36415 COLL VENOUS BLD VENIPUNCTURE: CPT

## 2024-11-11 PROCEDURE — 1100000000 HC RM PRIVATE

## 2024-11-11 PROCEDURE — 97116 GAIT TRAINING THERAPY: CPT

## 2024-11-11 PROCEDURE — 80048 BASIC METABOLIC PNL TOTAL CA: CPT

## 2024-11-11 PROCEDURE — 85025 COMPLETE CBC W/AUTO DIFF WBC: CPT

## 2024-11-11 PROCEDURE — 6370000000 HC RX 637 (ALT 250 FOR IP): Performed by: STUDENT IN AN ORGANIZED HEALTH CARE EDUCATION/TRAINING PROGRAM

## 2024-11-11 PROCEDURE — 94760 N-INVAS EAR/PLS OXIMETRY 1: CPT

## 2024-11-11 PROCEDURE — 94640 AIRWAY INHALATION TREATMENT: CPT

## 2024-11-11 PROCEDURE — 6370000000 HC RX 637 (ALT 250 FOR IP): Performed by: INTERNAL MEDICINE

## 2024-11-11 PROCEDURE — 97110 THERAPEUTIC EXERCISES: CPT

## 2024-11-11 PROCEDURE — 83735 ASSAY OF MAGNESIUM: CPT

## 2024-11-11 RX ADMIN — ROSUVASTATIN CALCIUM 20 MG: 20 TABLET, FILM COATED ORAL at 21:08

## 2024-11-11 RX ADMIN — IPRATROPIUM BROMIDE AND ALBUTEROL SULFATE 1 DOSE: .5; 3 SOLUTION RESPIRATORY (INHALATION) at 16:30

## 2024-11-11 RX ADMIN — WATER 1000 MG: 1 INJECTION INTRAMUSCULAR; INTRAVENOUS; SUBCUTANEOUS at 20:59

## 2024-11-11 RX ADMIN — FAMOTIDINE 20 MG: 20 TABLET, FILM COATED ORAL at 21:04

## 2024-11-11 RX ADMIN — TAMSULOSIN HYDROCHLORIDE 0.4 MG: 0.4 CAPSULE ORAL at 09:58

## 2024-11-11 RX ADMIN — IPRATROPIUM BROMIDE AND ALBUTEROL SULFATE 1 DOSE: .5; 3 SOLUTION RESPIRATORY (INHALATION) at 08:20

## 2024-11-11 RX ADMIN — SODIUM CHLORIDE, PRESERVATIVE FREE 10 ML: 5 INJECTION INTRAVENOUS at 21:00

## 2024-11-11 RX ADMIN — IPRATROPIUM BROMIDE AND ALBUTEROL SULFATE 1 DOSE: .5; 3 SOLUTION RESPIRATORY (INHALATION) at 20:35

## 2024-11-11 RX ADMIN — ACETAMINOPHEN 650 MG: 325 TABLET ORAL at 02:01

## 2024-11-11 RX ADMIN — ENOXAPARIN SODIUM 30 MG: 100 INJECTION SUBCUTANEOUS at 09:58

## 2024-11-11 RX ADMIN — FAMOTIDINE 20 MG: 20 TABLET, FILM COATED ORAL at 09:58

## 2024-11-11 RX ADMIN — ARFORMOTEROL TARTRATE: 15 SOLUTION RESPIRATORY (INHALATION) at 20:32

## 2024-11-11 RX ADMIN — PREDNISONE 40 MG: 20 TABLET ORAL at 09:58

## 2024-11-11 RX ADMIN — FOLIC ACID 1 MG: 1 TABLET ORAL at 09:58

## 2024-11-11 RX ADMIN — SODIUM CHLORIDE, PRESERVATIVE FREE 10 ML: 5 INJECTION INTRAVENOUS at 10:41

## 2024-11-11 RX ADMIN — THERA TABS 1 TABLET: TAB at 09:58

## 2024-11-11 RX ADMIN — Medication 100 MG: at 09:58

## 2024-11-11 RX ADMIN — ARFORMOTEROL TARTRATE: 15 SOLUTION RESPIRATORY (INHALATION) at 08:25

## 2024-11-11 ASSESSMENT — PAIN DESCRIPTION - DESCRIPTORS: DESCRIPTORS: ACHING

## 2024-11-11 ASSESSMENT — PAIN SCALES - GENERAL
PAINLEVEL_OUTOF10: 2
PAINLEVEL_OUTOF10: 6
PAINLEVEL_OUTOF10: 0
PAINLEVEL_OUTOF10: 0

## 2024-11-11 ASSESSMENT — PAIN DESCRIPTION - LOCATION: LOCATION: HEAD

## 2024-11-11 NOTE — PROGRESS NOTES
PRN    sodium chloride flush 0.9 % injection 5-40 mL  5-40 mL IntraVENous 2 times per day    sodium chloride flush 0.9 % injection 5-40 mL  5-40 mL IntraVENous PRN    0.9 % sodium chloride infusion   IntraVENous PRN    ondansetron (ZOFRAN-ODT) disintegrating tablet 4 mg  4 mg Oral Q8H PRN    Or    ondansetron (ZOFRAN) injection 4 mg  4 mg IntraVENous Q6H PRN    polyethylene glycol (GLYCOLAX) packet 17 g  17 g Oral Daily PRN    acetaminophen (TYLENOL) tablet 650 mg  650 mg Oral Q6H PRN    Or    acetaminophen (TYLENOL) suppository 650 mg  650 mg Rectal Q6H PRN    famotidine (PEPCID) tablet 20 mg  20 mg Oral BID    aluminum & magnesium hydroxide-simethicone (MAALOX) 200-200-20 MG/5ML suspension 30 mL  30 mL Oral Q6H PRN    ipratropium 0.5 mg-albuterol 2.5 mg (DUONEB) nebulizer solution 1 Dose  1 Dose Inhalation 4x Daily RT    albuterol (PROVENTIL) (2.5 MG/3ML) 0.083% nebulizer solution 2.5 mg  2.5 mg Nebulization Q6H PRN    guaiFENesin-dextromethorphan (ROBITUSSIN DM) 100-10 MG/5ML syrup 5 mL  5 mL Oral Q4H PRN    thiamine mononitrate tablet 100 mg  100 mg Oral Daily    multivitamin 1 tablet  1 tablet Oral Daily    folic acid (FOLVITE) tablet 1 mg  1 mg Oral Daily    LORazepam (ATIVAN) tablet 1 mg  1 mg Oral Q1H PRN    Or    LORazepam (ATIVAN) injection 1 mg  1 mg IntraVENous Q1H PRN    Or    LORazepam (ATIVAN) tablet 2 mg  2 mg Oral Q1H PRN    Or    LORazepam (ATIVAN) injection 2 mg  2 mg IntraVENous Q1H PRN    Or    LORazepam (ATIVAN) tablet 3 mg  3 mg Oral Q1H PRN    Or    LORazepam (ATIVAN) injection 3 mg  3 mg IntraVENous Q1H PRN    Or    LORazepam (ATIVAN) tablet 4 mg  4 mg Oral Q1H PRN    Or    LORazepam (ATIVAN) injection 4 mg  4 mg IntraVENous Q1H PRN    arformoterol 15 mcg-budesonide 0.5 mg neb solution   Nebulization BID RT    magnesium sulfate 2000 mg in 50 mL IVPB premix  2,000 mg IntraVENous PRN    cefTRIAXone (ROCEPHIN) 1,000 mg in sterile water 10 mL IV syringe  1,000 mg IntraVENous Q24H    tamsulosin

## 2024-11-11 NOTE — PROGRESS NOTES
End of Shift Note    Bedside shift change report given to SUSANA Walker (oncoming nurse) by Bj Nayak RN (offgoing nurse).  Report included the following information SBAR REPORTS LIST: SBAR, Kardex, Intake/Output, MAR, Recent Results, and Cardiac Rhythm Sinus Tachycardia    Shift worked:  1900-0730     Shift summary and any significant changes:    Tylenol x1 dose given for headache. No other concerns at this time.      Concerns for physician to address:  None      Zone phone for oncoming shift:   [phone]       Activity:  Activity: Out of bed  Number times ambulated in hallways past shift: 0  Number of times OOB to chair past shift: 2    Cardiac:   Cardiac Monitoring: YES / NO: Yes        Access:  Current line(s): IV ACCESS: - PIV    Genitourinary:   Urinary status: Urinary status: Patient is voiding without difficulty.    Respiratory:   oxygen delivery: O2 @ 2LPM via nasal cannula   Chronic home O2 use?: YES / NO: No  Incentive spirometer at bedside: YES / NO: Yes      GI:  Current diet:  DIET: regular  Passing flatus: YES / NO: Yes  Tolerating current diet: YES / NO: Yes      Pain Management:   Patient states pain is manageable on current regimen: YES / NO: Yes    Skin:    Interventions: HARRIETT SCALE: 20    Patient Safety:  Fall Score: FALL RISK ASSESSMENT: Not at risk for falls.  Interventions: Fall Interventions Provided: Implemented/recommended use of non-skid footwear      Length of Stay:  Expected LOS: 3  Actual LOS: 5      Bj Nayak RN

## 2024-11-11 NOTE — PROGRESS NOTES
Hospitalist Progress Note    NAME:   Fab Gonzalez   : 1960   MRN: [id number]     Date/Time: 2024 6:35 PM  Patient PCP: Esperanza Diamond APRN - NP    Estimated discharge date:   Barriers: Pulm clearance, MBS tomorrow      Assessment / Plan:    Acute COPD exacerbation POA- pt off oxygen now, was transiently hypoxic at [facility] on arrival in ER  Centrilobular Emphysema POA  Cannot rule out underlying PNA- Bibasilar ASD  Decreased breath sounds bilaterally  History of cocaine abuse as well.   Cont with duonebs  Cont antibiotics- rocephin. S/p azithromycin  Cont steroids- prednisone for total of 5 days  Procal 0.1  Oxygen prn  Cessation counseling given again on arrival to [facility]  WBC 26 -> 18  Rapid Flu and COVID test ng at [facility]  - wean O2, down to 1L  - CT chest ordered per Pulm, showed:  Right lung base consolidation with small right pleural effusion demonstrating post thoracentesis changes. The consolidation may be atelectatic or infectious. Panlobular emphysema.  - evalled by SLP, planning on MBS tomorrow     Moderate Right sided pleural effusion POA  Suspect Malignant Pleural Effusion given history of lung cancer stage 4 POA- in remission since  per pt, follows at [facility] oncology  Protein Calorie Malnutrition POA     Pt transferred to [facility] for R Thoracentesis for cytology to rule out metastatic disease.   Pt was being [facility] via [facility] Pulmonology consultation at [facility] before transfer to [facility]- if need arises will need IP Pulm consult for Pulm Nicolas here.  - consulted Pulmonology  - underwent thoracentesis with IR , 250 mL removed, culture is pending  - discussed with Pulm , concerning for infection, not clear for discharge as of yet, continue abx  - more dyspneic 11/10, ordered repeat CXR, concerned for reaccumulation of pleural effusion. CXR showed Slight reaccumulation of small right pleural effusion amongst right lower lobe airspace disease and

## 2024-11-11 NOTE — PROGRESS NOTES
Orders received, chart reviewed and patient evaluated by occupational therapy. Pending progression with skilled acute occupational therapy, recommend:    No skilled occupational therapy    Recommend with nursing patient to complete as able in order to maintain strength, endurance and independence: OOB to chair 3x/day, ADLs ad dar and performing toileting to bathroom ad dar. Thank you for your assistance.     Full evaluation to follow.

## 2024-11-11 NOTE — PLAN OF CARE
Problem: Discharge Planning  Goal: Discharge to home or other facility with appropriate resources  Outcome: Progressing     Problem: Respiratory - Adult  Goal: Achieves optimal ventilation and oxygenation  11/10/2024 2256 by Bj Nayak RN  Outcome: Progressing  11/10/2024 2048 by Jorge L Campbell RCP  Outcome: Progressing     Problem: Safety - Adult  Goal: Free from fall injury  Outcome: Progressing     Problem: Pain  Goal: Verbalizes/displays adequate comfort level or baseline comfort level  Outcome: Progressing

## 2024-11-11 NOTE — PROGRESS NOTES
Speech pathology note  Orders received due to pneumonia. Please check MBS. Will tentatively plan on MBS tomorrow; patient in agreement. Thank you.    FLORENCE Atwood Ed., CCC-SLP

## 2024-11-11 NOTE — PLAN OF CARE
Status: Within Functional Limits  Orientation Level: Oriented X4  Cognition  Overall Cognitive Status: WFL        Vision/Perceptual:    Vision - Basic Assessment  Prior Vision: Wears glasses only for reading        Perception  Overall Perceptual Status: WFL    Strength:    Strength: Generally decreased, functional    Tone & Sensation:   Tone: Normal  Sensation: Intact    Coordination:  Coordination: Within functional limits    Range Of Motion:  AROM: Within functional limits       Functional Mobility:  Bed Mobility:     Bed Mobility Training  Bed Mobility Training: Yes  Supine to Sit: Independent  Sit to Supine: Independent  Scooting: Independent  Transfers:     Transfer Training  Transfer Training: Yes  Sit to Stand: Independent  Stand to Sit: Independent  Balance:               Balance  Sitting: Intact  Standing: Intact  Ambulation/Gait Training:                       Gait  Gait Training: Yes  Overall Level of Assistance: Supervision  Distance (ft): 120 Feet  Assistive Device: None  Base of Support: Narrowed  Speed/Yamileth: Slow  Step Length: Right shortened;Left shortened                                                                                                                                                                                                                                                                [facility]-PAC®      Basic Mobility Inpatient Short Form (6-Clicks) Version 2    How much help is needed turning from your back to your side while in a flat bed without using bedrails?: None  How much help is needed moving from lying on your back to sitting on the side of a flat bed without using bedrails?: None  How much help is needed moving to and from a bed to a chair?: None  How much help is needed standing up from a chair using your arms?: None  How much help is needed walking in hospital room?: None  How much help is needed climbing 3-5 steps with a railing?: A Little    AM-PAC  Verbalized understanding    Thank you for this referral.  Abigail Jackson, PT  Minutes: 26      Physical Therapy Evaluation Charge Determination   History Examination Presentation Decision-Making   LOW Complexity : Zero comorbidities / personal factors that will impact the outcome / POC LOW Complexity : 1-2 Standardized tests and measures addressing body structure, function, activity limitation and / or participation in recreation  LOW Complexity : Stable, uncomplicated  AM-PAC  LOW    Based on the above components, the patient evaluation is determined to be of the following complexity level: Low

## 2024-11-11 NOTE — PLAN OF CARE
Problem: Discharge Planning  Goal: Discharge to home or other facility with appropriate resources  Outcome: Progressing     Problem: Respiratory - Adult  Goal: Achieves optimal ventilation and oxygenation  11/11/2024 1511 by Denise Hernandez RN  Outcome: Progressing  11/11/2024 0828 by Abigail Morgan, RT  Outcome: Progressing     Problem: Safety - Adult  Goal: Free from fall injury  Outcome: Progressing     Problem: Pain  Goal: Verbalizes/displays adequate comfort level or baseline comfort level  Outcome: Progressing

## 2024-11-11 NOTE — PROGRESS NOTES
OCCUPATIONAL THERAPY EVALUATION/DISCHARGE  Patient: Fab Gonzalez (64 y.o. male)  Date: 11/11/2024  Primary Diagnosis: Pneumonia due to organism [J18.9]  Pleural effusion on right [J90]         Precautions:                    ASSESSMENT :  Based on the objective data below, the patient presents with baseline ADL performance s/p admission for pleural effusion and pneumonia. Patient currently living in hotel with sister and nephew. Patient IND with bed and functional mobility. Patient IND with hand washing and able to simulate tailor sitting on EOB. Patient returned to bed IND and left with call bell in reach, RN aware, all needs met. Patient with no acute OT needs, will sign off.     Functional Outcome Measure:  The patient scored 100/100 on the Barthel Index outcome measure which is indicative of no deficits in ADLs.      Further skilled acute occupational therapy is not indicated at this time.     PLAN :  Recommend with staff: Recommend with nursing, ADLs with supervision/setup, OOB to chair 3x/day and toileting via functional mobility to and from bathroom. Thank you for completing as able in order to maintain patient strength, endurance and independence.     Recommendation for discharge: (in order for the patient to meet his/her long term goals):   No skilled occupational therapy    Other factors to consider for discharge: no additional factors    IF patient discharges home will need the following DME: none     SUBJECTIVE:   Patient stated, “I can take care of myself.”    OBJECTIVE DATA SUMMARY:     Past Medical History:   Diagnosis Date    Asthma      Past Surgical History:   Procedure Laterality Date    OTHER SURGICAL HISTORY      \"surgery for my lung\"       Prior Level of Function/Environment/Context: IND with ADLs and mobility.  Receives Help From: Family, ADL Assistance: Independent,  ,  ,  ,  ,  , Homemaking Assistance: Independent, Ambulation Assistance: Independent, Transfer Assistance: Independent, Active      Feeding: Independent     Grooming: Independent;Based on clinical judgement     UE Bathing: Independent;Based on clinical judgement     LE Bathing: Independent;Based on clinical judgement     UE Dressing: Independent;Based on clinical judgement     LE Dressing: Independent;Based on clinical judgement     Toileting: Independent;Based on clinical judgement     ADL Intervention and task modifications:    Patient IND with bed and functional mobility. Patient IND with hand washing and able to simulate tailor sitting on EOB.                                                                                                                                                                                                                                      Barthel Index:    Barthel Index Scale  Feeding: Independent, Able to apply any necessary device. Feeds in reasonable time  Bathing: Performs without assistance  Grooming: Washes face, love hair, brushes teeth, shaves (manages plug if electric razor)  Dressing: Independent, Ties shoes, fasteners, applies braces  Bowel Control: No accidents. Able to use enema or suppository if needed  Bladder Control: No accidents. Able to care for collecting device, if used  Toilet Transfers: Independent with toilet or bedpan. Handles clothes, wipes, flushes or cleans wooten  Chair/Bed Trannsfers: Independent, including locks of wheelchair and lifting footrests  Ambulation: Independent for 50 yards. May use assistive devices, except for rolling walker  Stairs: Independent. May use assistive devices  Total Barthel Index Score: 100       The Barthel ADL Index: Guidelines  1. The index should be used as a record of what a patient does, not as a record of what a patient could do.  2. The main aim is to establish degree of independence from any help, physical or verbal, however minor and for whatever reason.  3. The need for supervision renders the patient not independent.  4. A patient's

## 2024-11-11 NOTE — PLAN OF CARE
Problem: Respiratory - Adult  Goal: Achieves optimal ventilation and oxygenation  11/10/2024 2048 by Jorge L Campbell RCP  Outcome: Progressing  11/10/2024 0816 by Abigail Lazcano RCP  Outcome: Progressing

## 2024-11-12 ENCOUNTER — APPOINTMENT (OUTPATIENT)
Facility: HOSPITAL | Age: 64
End: 2024-11-12
Attending: INTERNAL MEDICINE
Payer: MEDICAID

## 2024-11-12 LAB
ANION GAP SERPL CALC-SCNC: 10 MMOL/L (ref 2–12)
BACTERIA SPEC CULT: NORMAL
BACTERIA SPEC CULT: NORMAL
BASOPHILS # BLD: 0 K/UL (ref 0–0.1)
BASOPHILS NFR BLD: 0 % (ref 0–1)
BUN SERPL-MCNC: 10 MG/DL (ref 6–20)
BUN/CREAT SERPL: 16 (ref 12–20)
CALCIUM SERPL-MCNC: 9 MG/DL (ref 8.5–10.1)
CHLORIDE SERPL-SCNC: 103 MMOL/L (ref 97–108)
CO2 SERPL-SCNC: 21 MMOL/L (ref 21–32)
CREAT SERPL-MCNC: 0.63 MG/DL (ref 0.7–1.3)
DIFFERENTIAL METHOD BLD: ABNORMAL
EOSINOPHIL # BLD: 0 K/UL (ref 0–0.4)
EOSINOPHIL NFR BLD: 0 % (ref 0–7)
ERYTHROCYTE [DISTWIDTH] IN BLOOD BY AUTOMATED COUNT: 13.2 % (ref 11.5–14.5)
GLUCOSE SERPL-MCNC: 129 MG/DL (ref 65–100)
HCT VFR BLD AUTO: 38 % (ref 36.6–50.3)
HGB BLD-MCNC: 12.7 G/DL (ref 12.1–17)
IMM GRANULOCYTES # BLD AUTO: 0 K/UL (ref 0–0.04)
IMM GRANULOCYTES NFR BLD AUTO: 0 % (ref 0–0.5)
LYMPHOCYTES # BLD: 3.4 K/UL (ref 0.8–3.5)
LYMPHOCYTES NFR BLD: 14 % (ref 12–49)
MAGNESIUM SERPL-MCNC: 1.4 MG/DL (ref 1.6–2.4)
MCH RBC QN AUTO: 31.4 PG (ref 26–34)
MCHC RBC AUTO-ENTMCNC: 33.4 G/DL (ref 30–36.5)
MCV RBC AUTO: 93.8 FL (ref 80–99)
MONOCYTES # BLD: 1.9 K/UL (ref 0–1)
MONOCYTES NFR BLD: 8 % (ref 5–13)
NEUTS SEG # BLD: 19 K/UL (ref 1.8–8)
NEUTS SEG NFR BLD: 78 % (ref 32–75)
NRBC # BLD: 0 K/UL (ref 0–0.01)
NRBC BLD-RTO: 0 PER 100 WBC
PHOSPHATE SERPL-MCNC: 3.6 MG/DL (ref 2.6–4.7)
PLATELET # BLD AUTO: 348 K/UL (ref 150–400)
PMV BLD AUTO: 8.3 FL (ref 8.9–12.9)
POTASSIUM SERPL-SCNC: 3.5 MMOL/L (ref 3.5–5.1)
RBC # BLD AUTO: 4.05 M/UL (ref 4.1–5.7)
RBC MORPH BLD: ABNORMAL
SERVICE CMNT-IMP: NORMAL
SERVICE CMNT-IMP: NORMAL
SODIUM SERPL-SCNC: 134 MMOL/L (ref 136–145)
WBC # BLD AUTO: 24.3 K/UL (ref 4.1–11.1)

## 2024-11-12 PROCEDURE — 6360000002 HC RX W HCPCS: Performed by: INTERNAL MEDICINE

## 2024-11-12 PROCEDURE — 74230 X-RAY XM SWLNG FUNCJ C+: CPT

## 2024-11-12 PROCEDURE — 6370000000 HC RX 637 (ALT 250 FOR IP): Performed by: INTERNAL MEDICINE

## 2024-11-12 PROCEDURE — 84100 ASSAY OF PHOSPHORUS: CPT

## 2024-11-12 PROCEDURE — 2580000003 HC RX 258: Performed by: INTERNAL MEDICINE

## 2024-11-12 PROCEDURE — 97530 THERAPEUTIC ACTIVITIES: CPT

## 2024-11-12 PROCEDURE — 85025 COMPLETE CBC W/AUTO DIFF WBC: CPT

## 2024-11-12 PROCEDURE — 1100000000 HC RM PRIVATE

## 2024-11-12 PROCEDURE — 2700000000 HC OXYGEN THERAPY PER DAY

## 2024-11-12 PROCEDURE — 80048 BASIC METABOLIC PNL TOTAL CA: CPT

## 2024-11-12 PROCEDURE — 94761 N-INVAS EAR/PLS OXIMETRY MLT: CPT

## 2024-11-12 PROCEDURE — 6360000002 HC RX W HCPCS: Performed by: PHYSICIAN ASSISTANT

## 2024-11-12 PROCEDURE — 83735 ASSAY OF MAGNESIUM: CPT

## 2024-11-12 PROCEDURE — 2580000003 HC RX 258: Performed by: PHYSICIAN ASSISTANT

## 2024-11-12 PROCEDURE — 99222 1ST HOSP IP/OBS MODERATE 55: CPT | Performed by: STUDENT IN AN ORGANIZED HEALTH CARE EDUCATION/TRAINING PROGRAM

## 2024-11-12 PROCEDURE — 94640 AIRWAY INHALATION TREATMENT: CPT

## 2024-11-12 PROCEDURE — 92611 MOTION FLUOROSCOPY/SWALLOW: CPT

## 2024-11-12 PROCEDURE — 36415 COLL VENOUS BLD VENIPUNCTURE: CPT

## 2024-11-12 RX ORDER — IPRATROPIUM BROMIDE AND ALBUTEROL SULFATE 2.5; .5 MG/3ML; MG/3ML
1 SOLUTION RESPIRATORY (INHALATION)
Status: DISCONTINUED | OUTPATIENT
Start: 2024-11-12 | End: 2024-11-16 | Stop reason: HOSPADM

## 2024-11-12 RX ADMIN — FOLIC ACID 1 MG: 1 TABLET ORAL at 09:59

## 2024-11-12 RX ADMIN — WATER 1000 MG: 1 INJECTION INTRAMUSCULAR; INTRAVENOUS; SUBCUTANEOUS at 21:55

## 2024-11-12 RX ADMIN — FAMOTIDINE 20 MG: 20 TABLET, FILM COATED ORAL at 19:50

## 2024-11-12 RX ADMIN — Medication 100 MG: at 10:00

## 2024-11-12 RX ADMIN — LORAZEPAM 1 MG: 2 INJECTION INTRAMUSCULAR; INTRAVENOUS at 19:50

## 2024-11-12 RX ADMIN — ROSUVASTATIN CALCIUM 20 MG: 20 TABLET, FILM COATED ORAL at 19:50

## 2024-11-12 RX ADMIN — ENOXAPARIN SODIUM 30 MG: 100 INJECTION SUBCUTANEOUS at 10:00

## 2024-11-12 RX ADMIN — THERA TABS 1 TABLET: TAB at 10:00

## 2024-11-12 RX ADMIN — IPRATROPIUM BROMIDE AND ALBUTEROL SULFATE 1 DOSE: .5; 3 SOLUTION RESPIRATORY (INHALATION) at 09:13

## 2024-11-12 RX ADMIN — TAMSULOSIN HYDROCHLORIDE 0.4 MG: 0.4 CAPSULE ORAL at 09:59

## 2024-11-12 RX ADMIN — FAMOTIDINE 20 MG: 20 TABLET, FILM COATED ORAL at 09:59

## 2024-11-12 RX ADMIN — ACETAMINOPHEN 650 MG: 325 TABLET ORAL at 19:50

## 2024-11-12 RX ADMIN — ARFORMOTEROL TARTRATE: 15 SOLUTION RESPIRATORY (INHALATION) at 09:18

## 2024-11-12 RX ADMIN — SODIUM CHLORIDE, PRESERVATIVE FREE 10 ML: 5 INJECTION INTRAVENOUS at 10:01

## 2024-11-12 RX ADMIN — SODIUM CHLORIDE, PRESERVATIVE FREE 10 ML: 5 INJECTION INTRAVENOUS at 21:56

## 2024-11-12 ASSESSMENT — PAIN DESCRIPTION - ORIENTATION
ORIENTATION: ANTERIOR
ORIENTATION: POSTERIOR
ORIENTATION: ANTERIOR

## 2024-11-12 ASSESSMENT — PAIN SCALES - GENERAL
PAINLEVEL_OUTOF10: 7
PAINLEVEL_OUTOF10: 7
PAINLEVEL_OUTOF10: 10

## 2024-11-12 ASSESSMENT — PAIN DESCRIPTION - FREQUENCY
FREQUENCY: CONTINUOUS
FREQUENCY: CONTINUOUS

## 2024-11-12 ASSESSMENT — PAIN DESCRIPTION - DESCRIPTORS
DESCRIPTORS: DISCOMFORT;PRESSURE
DESCRIPTORS: DISCOMFORT;PRESSURE

## 2024-11-12 ASSESSMENT — PAIN DESCRIPTION - LOCATION
LOCATION: CHEST
LOCATION: GENERALIZED
LOCATION: CHEST

## 2024-11-12 NOTE — PROGRESS NOTES
SPEECH PATHOLOGY MODIFIED BARIUM SWALLOW STUDY WITH DISCHARGE  Patient: Fab Gonzalez (64 y.o. male)  Date: 11/12/2024  Primary Diagnosis: Pneumonia due to organism [J18.9]  Pleural effusion on right [J90]       Precautions:                      ASSESSMENT :  The patient presents with a functional oropharyngeal swallow. Oral and pharyngeal phases of the swallow assessed to be within normal limits. Trace, transient penetration to the laryngeal vestibule observed with thin liquids during the swallow with successive sips, which cleared with the force of the swallow. No aspiration observed with any consistency trialed. Patient's swallow efficiency and safety are intact. Recommend continue Regular/Thin Liquid diet.    Patient will be discharged from skilled speech-language pathology services at this time.     PLAN :  Recommendations and Planned Interventions:  Diet: Regular and thin liquids  -- Medication as tolerated  -- No further acute SLP needs at this time    Acute SLP Services: No, patient will be discharged from acute skilled speech-language pathology at this time.  Discharge Recommendations: No, additional SLP treatment not indicated at discharge     SUBJECTIVE:   Patient agreeable to MBS.    OBJECTIVE:     Past Medical History:   Diagnosis Date    Asthma      Past Surgical History:   Procedure Laterality Date    OTHER SURGICAL HISTORY      \"surgery for my lung\"     Prior Level of Function/Home Situation:   Social/Functional History  Lives With: Family (living with sister in hotel)  Type of Home:  (hotel)  Home Layout: Multi-level  Home Access: Stairs to enter with rails  Entrance Stairs - Number of Steps: 14  Entrance Stairs - Rails: Both  Bathroom Shower/Tub: Tub/Shower unit  Bathroom Toilet: Standard  Bathroom Equipment: None  Bathroom Accessibility: Accessible  Home Equipment: None  Has the patient had two or more falls in the past year or any fall with injury in the past year?: No  Receives Help From:  Dysfunction: None       Functional Oral Intake Scale (FOIS): 7--Total oral diet with no restrictions    COMMUNICATION/EDUCATION:   Patient was educated regarding results of MBS. He demonstrated Good understanding as evidenced by Verbalizing understanding.    The patient's plan of care including recommendations, planned interventions, and recommended diet changes were discussed with: Physician    Patient/family have participated as able in goal setting and plan of care and Patient/family agree to work toward stated goals and plan of care    Thank you,  Pradeep Alvarez, SLP    SLP Individual Minutes  Time In: 1130  Time Out: 1150  Minutes: 20    yes

## 2024-11-12 NOTE — PROGRESS NOTES
End of Shift Note    Bedside shift change report given to SUSANA Lynn (oncoming nurse) by Rozina Caceres LPN (offgoing nurse).  Report included the following information SBAR, Intake/Output, MAR, Recent Results, and Cardiac Rhythm NSR    Shift worked:  7A to 7P     Shift summary and any significant changes:     Pulmonology and Oncology consulted. Barium Swallow Study delayed due to elevators beings down. Small Zenker's diverticulum found.  Dyspnea on exertion continues. O2 sats still WDL. Patient unsteady when ambulating. Bed alarm set at end of shift.  Complaints of discomfort, pain medication refused.  Next CIWA due at 0013.  Plan of care ongoing.     Concerns for physician to address:  None     Zone phone for oncoming shift:   [phone]       Activity:     Number times ambulated in hallways past shift: 0  Number of times OOB to chair past shift: 0    Cardiac:   Cardiac Monitoring: Yes           Access:  Current line(s): PIV     Genitourinary:   Urinary status: voiding    Respiratory:      Chronic home O2 use?: NO  Incentive spirometer at bedside: NO       GI:     Current diet:  ADULT DIET; Regular  ADULT ORAL NUTRITION SUPPLEMENT; Breakfast, Lunch, Dinner; Standard High Calorie/High Protein Oral Supplement  Passing flatus: YES  Tolerating current diet: YES       Pain Management:   Patient states pain is manageable on current regimen: YES    Skin:     Interventions: float heels and increase time out of bed    Patient Safety:  Fall Score:    Interventions: bed/chair alarm and gripper socks       Length of Stay:  Expected LOS: 6  Actual LOS: 6      Rozina Caceres LPN

## 2024-11-12 NOTE — PROGRESS NOTES
PCP Hospital follow-up transitional care appointment has been scheduled with NP Esperanza Diamond on 11/26/24 7553. This is a previously scheduled appt and still first available. [facility] information on AVS for patient resource. Pending patient discharge.

## 2024-11-12 NOTE — PROGRESS NOTES
Pulmonary, Critical Care, and Sleep Medicine~Progress Note    Name: Fab Gonzalez MRN: [id number]   : 1960 Hospital: [facility]   Date: 2024 9:48 AM Admission: 2024     Impression Plan   Abnormal chest imaging: right pleural effusion and RLL atelectasis vs. superimposed pneumonia--s/p R thora on  with 250 ml cloudy yellow fluid drained, exudative and c/w empyema; ldh 2702, protein 4.6, low pH, very low glucose (1), elevated polys. Cx neg.   Repeat CT chest  with sight re-accumulation of fluid   Leukocytosis- 24.3 today from 15.8. yesterday, on steroids, afebrile   COPD, not in an acute exacerbation  Acute hypoxemic respiratory failure--resolved  H/o lung CA  Polysubstance abuse: cocaine, ETOH  Hyponatremia F/u pleural fluid cytology  Exercise oximetry does not demonstrate oxygen requirement   Monitor WBC and temp curve overnight with significant increase today   Continue ceftriaxone day 7. Completed azithro. Treat with 4 week course of antibiotics. Transition to Augmentin at discharge  Speech therapy consulted for MBS today   Continue scheduled duonebs  On prednisone day 5        Interval History:     Patient seen this morning sitting up in bed. States his breathing is about the same as yesterday. Endorses dyspnea with exertion. Cough productive of clear sputum. No chest pain, edema, fever, chills.    : says he's not feeling well. He reports ALCANTARA, which is stable since thoracentesis but worse than his baseline. Says he feels like the fluid on his lung is increasing. Cough productive of scant clear/white mucus. He had a HA overnight. O2 sat 90% on RA, HR 100s.  Afebrile  Wbc 15.8--down    CXR 11/10/24: Slight reaccumulation of small right pleural effusion amongst right lower lobe  airspace disease and diffuse emphysema.    Asked to consult on patient by Dr. Mendel for \"pleural effusion, concern for malignancy, will he need any more

## 2024-11-12 NOTE — PROGRESS NOTES
Speech Pathology Note    Plan for Modified Barium Swallow Study (MBS) this morning, however patient elevators are currently down and patient unable to be transported to Radiology at this time.     Addendum 12:30  MBS complete. Full note to follow. No oropharyngeal dysphagia present. No aspiration observed. No changes in diet indicated at this time.    Pradeep Alvarez M.S., CCC-SLP

## 2024-11-12 NOTE — PLAN OF CARE
physical therapy    Other factors to consider for discharge: no additional factors    IF patient discharges home will need the following DME: none     SUBJECTIVE:   Patient stated, \"I know how I got this pneumonia.\"    OBJECTIVE DATA SUMMARY:   Critical Behavior:          Functional Mobility Training:  Bed Mobility:  Bed Mobility Training  Bed Mobility Training: Yes  Supine to Sit: Independent  Scooting: Independent  Transfers:  Transfer Training  Sit to Stand: Independent  Stand to Sit: Independent  Balance:  Balance  Sitting: Intact   Ambulation/Gait Training:     Gait  Gait Training: Yes  Overall Level of Assistance: Independent  Assistive Device: None  Speed/Yamileth: Slow  Step Length: Right shortened;Left shortened    Activity Tolerance:   Good, Fair , and SpO2 stable on room air    After treatment:   Patient left in no apparent distress in bed, Call bell within reach, and Side rails x3      COMMUNICATION/EDUCATION:   The patient's plan of care was discussed with: registered nurse    Patient Education  Education Given To: Patient  Education Provided Comments: energy conservation  Education Method: Verbal  Barriers to Learning: None  Education Outcome: Verbalized understanding      Archana Smith PT  Minutes: 12

## 2024-11-12 NOTE — PLAN OF CARE
Problem: Respiratory - Adult  Goal: Achieves optimal ventilation and oxygenation  11/11/2024 2036 by Jorge L Campbell RCP  Outcome: Progressing  11/11/2024 1511 by Denise Hernandez RN  Outcome: Progressing  11/11/2024 0828 by Abigail Morgan, RT  Outcome: Progressing

## 2024-11-12 NOTE — CONSULTS
Mental  status: normal mood, behavior, speech, dress, motor activity, and thought processes, able to follow commands   HENT: NCAT   Neck: no visualized mass   Resp: no respiratory distress   Neuro: no gross deficits   Skin: no discoloration or lesions of concern on visible areas   Psychiatric: normal affect, consistent with stated mood, no evidence of hallucinations         Results:     Lab Results   Component Value Date    WBC 24.3 (H) 11/12/2024    HGB 12.7 11/12/2024    HCT 38.0 11/12/2024    MCV 93.8 11/12/2024     11/12/2024     Lab Results   Component Value Date     (L) 11/12/2024    K 3.5 11/12/2024     11/12/2024    CO2 21 11/12/2024    BUN 10 11/12/2024    CREATININE 0.63 (L) 11/12/2024    GLUCOSE 129 (H) 11/12/2024    CALCIUM 9.0 11/12/2024    BILITOT 0.3 11/05/2024    ALKPHOS 93 11/05/2024    AST 17 11/05/2024    ALT 17 11/05/2024    LABGLOM >90 11/12/2024    GFRAA >60 04/02/2021    AGRATIO 0.9 (L) 04/01/2021    GLOB 5.1 (H) 11/05/2024       CT Result (most recent):  CT CHEST WO CONTRAST 11/11/2024    Narrative  INDICATION: Pleural effusion, pneumonia    COMPARISON: November 5, 2024    CONTRAST: None.    TECHNIQUE:  5 mm axial images were obtained through the chest. Coronal and  sagittal reformats were generated.  CT dose reduction was achieved through use  of a standardized protocol tailored for this examination and automatic exposure  control for dose modulation.    The absence of intravenous contrast reduces the sensitivity for evaluation of  the mediastinum, pedro, vasculature, and upper abdominal organs.    FINDINGS:    CHEST WALL: No mass or axillary lymphadenopathy.  MEDIASTINUM: No mass or lymphadenopathy.  PEDRO: No mass or lymphadenopathy.  THORACIC AORTA: No aneurysm.  MAIN PULMONARY ARTERY: Normal in caliber.  TRACHEA/BRONCHI: Patent.  ESOPHAGUS: No wall thickening or dilatation.  HEART: Normal in size. Coronary artery calcium: present  PLEURA: Small right pleural effusion  with locules of air from recent  thoracentesis. No left pleural effusion.  LUNGS: Panlobular emphysema. Postsurgical changes in the superior segment of the  left lower lobe. Consolidation at the right lung base may be atelectatic or  infectious.  INCIDENTALLY IMAGED UPPER ABDOMEN: No significant abnormality in the  incidentally imaged upper abdomen.  BONES: No destructive bone lesion.    Impression  Right lung base consolidation with small right pleural effusion demonstrating  post thoracentesis changes. The consolidation may be atelectatic or infectious.  Panlobular emphysema.    Electronically signed by DIANA LANE          Assessment:     1. Pleural effusion  2. Stage IV lung adenocarcinoma    MDM Elements:     1) Data Complexity -  I reviewed the patients most recent hematology labs, as well as relevant labs from prior encounters.   2) Complexity of problems addressed - High -   3) Risk of complications, morbidity, and mortality of treatment of management - High    Recommendations:     >> No role for further inpatient cancer management.  He should follow with his primary oncologist for outpatient PET scan and interval imaging to assess for fluid re-accumulation.  He was advised to call his primary oncologist at [facility] with any worsening symptoms.  He verbalized understanding and agreement.         Didi Palmer MD    Attending Medical Oncologist   [facility]            Signed By: Didi Palmer MD      Attending Medical Oncologist   [facility]

## 2024-11-13 ENCOUNTER — APPOINTMENT (OUTPATIENT)
Facility: HOSPITAL | Age: 64
End: 2024-11-13
Attending: INTERNAL MEDICINE
Payer: MEDICAID

## 2024-11-13 LAB
ANION GAP SERPL CALC-SCNC: 8 MMOL/L (ref 2–12)
BASOPHILS # BLD: 0 K/UL (ref 0–0.1)
BASOPHILS NFR BLD: 0 % (ref 0–1)
BUN SERPL-MCNC: 12 MG/DL (ref 6–20)
BUN/CREAT SERPL: 19 (ref 12–20)
CALCIUM SERPL-MCNC: 9.1 MG/DL (ref 8.5–10.1)
CHLORIDE SERPL-SCNC: 100 MMOL/L (ref 97–108)
CO2 SERPL-SCNC: 25 MMOL/L (ref 21–32)
CREAT SERPL-MCNC: 0.64 MG/DL (ref 0.7–1.3)
DIFFERENTIAL METHOD BLD: ABNORMAL
EOSINOPHIL # BLD: 0 K/UL (ref 0–0.4)
EOSINOPHIL NFR BLD: 0 % (ref 0–7)
ERYTHROCYTE [DISTWIDTH] IN BLOOD BY AUTOMATED COUNT: 13.1 % (ref 11.5–14.5)
GLUCOSE SERPL-MCNC: 105 MG/DL (ref 65–100)
HCT VFR BLD AUTO: 42.6 % (ref 36.6–50.3)
HGB BLD-MCNC: 14.4 G/DL (ref 12.1–17)
IMM GRANULOCYTES # BLD AUTO: 0 K/UL (ref 0–0.04)
IMM GRANULOCYTES NFR BLD AUTO: 0 % (ref 0–0.5)
LYMPHOCYTES # BLD: 2.1 K/UL (ref 0.8–3.5)
LYMPHOCYTES NFR BLD: 10 % (ref 12–49)
MAGNESIUM SERPL-MCNC: 1.4 MG/DL (ref 1.6–2.4)
MCH RBC QN AUTO: 31.5 PG (ref 26–34)
MCHC RBC AUTO-ENTMCNC: 33.8 G/DL (ref 30–36.5)
MCV RBC AUTO: 93.2 FL (ref 80–99)
MONOCYTES # BLD: 1.4 K/UL (ref 0–1)
MONOCYTES NFR BLD: 7 % (ref 5–13)
NEUTS SEG # BLD: 17.2 K/UL (ref 1.8–8)
NEUTS SEG NFR BLD: 83 % (ref 32–75)
NRBC # BLD: 0 K/UL (ref 0–0.01)
NRBC BLD-RTO: 0 PER 100 WBC
PHOSPHATE SERPL-MCNC: 3.7 MG/DL (ref 2.6–4.7)
PLATELET # BLD AUTO: 371 K/UL (ref 150–400)
PMV BLD AUTO: 8.4 FL (ref 8.9–12.9)
POTASSIUM SERPL-SCNC: 4.2 MMOL/L (ref 3.5–5.1)
RBC # BLD AUTO: 4.57 M/UL (ref 4.1–5.7)
RBC MORPH BLD: ABNORMAL
SODIUM SERPL-SCNC: 133 MMOL/L (ref 136–145)
WBC # BLD AUTO: 20.7 K/UL (ref 4.1–11.1)

## 2024-11-13 PROCEDURE — 94640 AIRWAY INHALATION TREATMENT: CPT

## 2024-11-13 PROCEDURE — 80048 BASIC METABOLIC PNL TOTAL CA: CPT

## 2024-11-13 PROCEDURE — 6370000000 HC RX 637 (ALT 250 FOR IP): Performed by: INTERNAL MEDICINE

## 2024-11-13 PROCEDURE — 6370000000 HC RX 637 (ALT 250 FOR IP): Performed by: STUDENT IN AN ORGANIZED HEALTH CARE EDUCATION/TRAINING PROGRAM

## 2024-11-13 PROCEDURE — 71045 X-RAY EXAM CHEST 1 VIEW: CPT

## 2024-11-13 PROCEDURE — 6360000002 HC RX W HCPCS: Performed by: INTERNAL MEDICINE

## 2024-11-13 PROCEDURE — 1100000000 HC RM PRIVATE

## 2024-11-13 PROCEDURE — 36415 COLL VENOUS BLD VENIPUNCTURE: CPT

## 2024-11-13 PROCEDURE — 94761 N-INVAS EAR/PLS OXIMETRY MLT: CPT

## 2024-11-13 PROCEDURE — 2580000003 HC RX 258: Performed by: INTERNAL MEDICINE

## 2024-11-13 PROCEDURE — 6360000002 HC RX W HCPCS: Performed by: PHYSICIAN ASSISTANT

## 2024-11-13 PROCEDURE — 83735 ASSAY OF MAGNESIUM: CPT

## 2024-11-13 PROCEDURE — 2580000003 HC RX 258: Performed by: PHYSICIAN ASSISTANT

## 2024-11-13 PROCEDURE — 85025 COMPLETE CBC W/AUTO DIFF WBC: CPT

## 2024-11-13 PROCEDURE — 84100 ASSAY OF PHOSPHORUS: CPT

## 2024-11-13 PROCEDURE — 6370000000 HC RX 637 (ALT 250 FOR IP): Performed by: NURSE PRACTITIONER

## 2024-11-13 RX ORDER — PREDNISONE 20 MG/1
40 TABLET ORAL DAILY
Status: DISCONTINUED | OUTPATIENT
Start: 2024-11-13 | End: 2024-11-16 | Stop reason: HOSPADM

## 2024-11-13 RX ADMIN — ENOXAPARIN SODIUM 30 MG: 100 INJECTION SUBCUTANEOUS at 10:28

## 2024-11-13 RX ADMIN — IPRATROPIUM BROMIDE AND ALBUTEROL SULFATE 1 DOSE: .5; 3 SOLUTION RESPIRATORY (INHALATION) at 08:15

## 2024-11-13 RX ADMIN — ROSUVASTATIN CALCIUM 20 MG: 20 TABLET, FILM COATED ORAL at 21:45

## 2024-11-13 RX ADMIN — TAMSULOSIN HYDROCHLORIDE 0.4 MG: 0.4 CAPSULE ORAL at 10:28

## 2024-11-13 RX ADMIN — PREDNISONE 40 MG: 20 TABLET ORAL at 13:12

## 2024-11-13 RX ADMIN — THERA TABS 1 TABLET: TAB at 10:28

## 2024-11-13 RX ADMIN — Medication 100 MG: at 10:28

## 2024-11-13 RX ADMIN — FAMOTIDINE 20 MG: 20 TABLET, FILM COATED ORAL at 10:28

## 2024-11-13 RX ADMIN — ARFORMOTEROL TARTRATE: 15 SOLUTION RESPIRATORY (INHALATION) at 20:12

## 2024-11-13 RX ADMIN — ARFORMOTEROL TARTRATE: 15 SOLUTION RESPIRATORY (INHALATION) at 08:10

## 2024-11-13 RX ADMIN — GUAIFENESIN AND DEXTROMETHORPHAN 5 ML: 100; 10 SYRUP ORAL at 21:46

## 2024-11-13 RX ADMIN — IPRATROPIUM BROMIDE AND ALBUTEROL SULFATE 1 DOSE: .5; 3 SOLUTION RESPIRATORY (INHALATION) at 20:12

## 2024-11-13 RX ADMIN — FOLIC ACID 1 MG: 1 TABLET ORAL at 10:27

## 2024-11-13 RX ADMIN — WATER 1000 MG: 1 INJECTION INTRAMUSCULAR; INTRAVENOUS; SUBCUTANEOUS at 21:45

## 2024-11-13 RX ADMIN — FAMOTIDINE 20 MG: 20 TABLET, FILM COATED ORAL at 21:45

## 2024-11-13 RX ADMIN — SODIUM CHLORIDE, PRESERVATIVE FREE 10 ML: 5 INJECTION INTRAVENOUS at 11:08

## 2024-11-13 RX ADMIN — ACETAMINOPHEN 650 MG: 325 TABLET ORAL at 13:22

## 2024-11-13 RX ADMIN — GUAIFENESIN AND DEXTROMETHORPHAN 5 ML: 100; 10 SYRUP ORAL at 06:00

## 2024-11-13 RX ADMIN — SODIUM CHLORIDE, PRESERVATIVE FREE 10 ML: 5 INJECTION INTRAVENOUS at 21:45

## 2024-11-13 ASSESSMENT — PAIN SCALES - GENERAL
PAINLEVEL_OUTOF10: 8
PAINLEVEL_OUTOF10: 2
PAINLEVEL_OUTOF10: 6
PAINLEVEL_OUTOF10: 7
PAINLEVEL_OUTOF10: 10

## 2024-11-13 ASSESSMENT — PAIN DESCRIPTION - ORIENTATION: ORIENTATION: RIGHT

## 2024-11-13 ASSESSMENT — PAIN DESCRIPTION - LOCATION
LOCATION: GENERALIZED
LOCATION: OTHER (COMMENT)

## 2024-11-13 ASSESSMENT — PAIN DESCRIPTION - DESCRIPTORS: DESCRIPTORS: SHARP

## 2024-11-13 NOTE — PROGRESS NOTES
Hospitalist Progress Note    NAME:   Fba Gonzalez   : 1960   MRN: [id number]     Date/Time: 2024 7:54 PM  Patient PCP: Esperanza Diamond APRN - NP    Estimated discharge date:   Barriers: Pulm clearance      Assessment / Plan:    Acute COPD exacerbation POA- pt off oxygen now, was transiently hypoxic at [facility] on arrival in ER  Centrilobular Emphysema POA  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   Cont with duonebs  Cont antibiotics- rocephin. S/p azithromycin  Cont steroids- prednisone for total of 5 days  Procal 0.1  Oxygen prn  Cessation counseling given again on arrival to [facility]  WBC 26 -> 18  Rapid Flu and COVID test ng at [facility]  - wean O2, down to 1L  - CT chest ordered per Pulm, showed:  Right lung base consolidation with small right pleural effusion demonstrating post thoracentesis changes. The consolidation may be atelectatic or infectious. Panlobular emphysema.  - evalled by SLP  - MBS , normal swallowing. Did note small Zenker diverticulum  - Pulm monitoring, marked increase in WBC count from yesterday, monitor overnight for fevers and WBC count per Pulm  - consulted Onc, outpatient followup  - Augmentin on discharge for 4 weeks per Pulm     Moderate Right sided pleural effusion POA  Suspect Malignant Pleural Effusion given history of lung cancer stage 4 POA- in remission since  per pt, follows at [facility] oncology  Protein Calorie Malnutrition POA     Pt transferred to [facility] for R Thoracentesis for cytology to rule out metastatic disease.   Pt was being followed by [facility] via Tele Pulmonology consultation at [facility] before transfer to [facility]- if need arises will need IP Pulm consult for Pulm Nicolas here.  - consulted Pulmonology  - underwent thoracentesis with IR , 250 mL removed, culture is pending  - discussed with Pulm , concerning for infection,

## 2024-11-13 NOTE — PROGRESS NOTES
Comprehensive Nutrition Assessment    Type and Reason for Visit:  Initial, LOS    Nutrition Recommendations/Plan:   Continue current diet and supplement     Malnutrition Assessment:  Malnutrition Status:  Severe malnutrition (11/13/24 1331)    Context:  Chronic Illness     Findings of the 6 clinical characteristics of malnutrition:  Energy Intake:  Mild decrease in energy intake  Weight Loss:  No weight loss     Body Fat Loss:  Severe body fat loss Orbital, Buccal region, Triceps   Muscle Mass Loss:  Severe muscle mass loss Temples (temporalis), Clavicles (pectoralis & deltoids)  Fluid Accumulation:  No fluid accumulation     Strength:  Not Performed    Nutrition Assessment:    Patient medically noted for COPD exacerbation, centrilobular emphysema, pneumonia, right pleural effusion, substance abuse, and history of lung cancer. Chart reviewed for length of stay. Patient receiving a regular diet s/p SLP evaluation. Eating well per patient and flowsheets. He likes ensure shakes and is drinking 2-3 per day. No recent weight changes but patient with severe muscle wasting and fat loss noted on NFPE. Continue liberalized diet + high kcal/high protein supplement TID. Encourage intake of meals.     Patient Vitals for the past 120 hrs:   PO Meals Eaten (%)   11/09/24 1830 76 - 100%   11/09/24 1402 76 - 100%   11/09/24 1010 76 - 100%   11/08/24 1850 76 - 100%     Nutrition Related Findings:    Na 133, Mg 1.4,    BM 11/12   Famotidine, Folic Acid, Rosuvastatin, Thiamine   Wound Type: None       Current Nutrition Intake & Therapies:    Average Meal Intake: %  Average Supplements Intake: %  ADULT DIET; Regular  ADULT ORAL NUTRITION SUPPLEMENT; Breakfast, Lunch, Dinner; Standard High Calorie/High Protein Oral Supplement    Anthropometric Measures:  Height: 167.6 cm (5' 6\")  Ideal Body Weight (IBW): 142 lbs (65 kg)       Current Body Weight: 48.1 kg (106 lb 0.7 oz),   IBW.    Current BMI (kg/m2): 17.1  Usual

## 2024-11-13 NOTE — PROGRESS NOTES
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   Cont with duonebs  Cont antibiotics- rocephin. S/p azithromycin  Cont steroids- prednisone for total of 5 days  Procal 0.1  Oxygen prn  Cessation counseling given again on arrival to [facility]  WBC 26 -> 18  Rapid Flu and COVID test ng at [facility]  - wean O2, down to 1L  - CT chest ordered per Pulm, showed:  Right lung base consolidation with small right pleural effusion demonstrating post thoracentesis changes. The consolidation may be atelectatic or infectious. Panlobular emphysema.  - evalled by SLP  - MBS , normal swallowing. Did note small Zenker diverticulum  - Pulm monitoring, marked increase in WBC count from yesterday, monitor overnight for fevers and WBC count per Pulm  - consulted Onc, outpatient followup  - Augmentin on discharge for 4 weeks per Pulm  , hypoxic on ambulation, trial of prednisone 40 mg p.o. daily.  Chest x-ray today     Moderate Right sided pleural effusion POA  Suspect Malignant Pleural Effusion given history of lung cancer stage 4 POA- in remission since  per pt, follows at [facility] oncology  Protein Calorie Malnutrition POA     Pt transferred to [facility] for R Thoracentesis for cytology to rule out metastatic disease.   Pt was being followed by [facility] via Tele Pulmonology consultation at [facility] before transfer to [facility]- if need arises will need IP Pulm consult for [facility] here.  - consulted Pulmonology  - underwent thoracentesis

## 2024-11-13 NOTE — PLAN OF CARE
Problem: Discharge Planning  Goal: Discharge to home or other facility with appropriate resources  Outcome: Progressing     Problem: Respiratory - Adult  Goal: Achieves optimal ventilation and oxygenation  Outcome: Progressing     Problem: Safety - Adult  Goal: Free from fall injury  Outcome: Progressing     Problem: Pain  Goal: Verbalizes/displays adequate comfort level or baseline comfort level  Outcome: Progressing     Problem: Physical Therapy - Adult  Goal: By Discharge: Performs mobility at highest level of function for planned discharge setting.  See evaluation for individualized goals.  Description: FUNCTIONAL STATUS PRIOR TO ADMISSION: Patient was independent and active without use of DME.    HOME SUPPORT PRIOR TO ADMISSION: The patient lived with sister and nephew in a motel .    Physical Therapy Goals  Initiated 11/11/2024  1. Patient will tolerate 10 minutes of consecutive aerobic therapeutic exercise and ambulation while maintaining O2 sats 90% and above within 7 day(s).   4.  Patient will ambulate with independence for 200 feet with the least restrictive device within 7 day(s).   5.  Patient will ascend/descend 12 stairs with 1 handrail(s) with modified independence within 7 day(s).    11/12/2024 1426 by Archana Smith, PT  Outcome: Adequate for Discharge

## 2024-11-13 NOTE — PROGRESS NOTES
End of Shift Note    Bedside shift change report given to SUSANA Wilson (oncoming nurse) by Rozina Caceres LPN (offgoing nurse).  Report included the following information SBAR, Intake/Output, MAR, Cardiac Rhythm Sinus tach/ NSR, and Alarm Parameters     Shift worked:  7A to 7P     Shift summary and any significant changes:     For generalized pain to R side of body pt given Tylenol x1 and Lidocaine patch x1.  O2 challenege failed. Pt to desat w/ or w/o use of supplemental O2. MD made aware - pt to stay overnight.  Patient reported only taking breaths between pain. Nursing education of deep breathing, decreasing stress levels, vocalizing pain, and not over exerting the body to patient and friend at bedside. Patient was reassured and reported felling cared for and less anxious.  Consulted with CM.  Plan of care ongoing       Concerns for physician to address:  None     Zone phone for oncoming shift:   [phone]       Activity:     Number times ambulated in hallways past shift: 2  Number of times OOB to chair past shift: 0    Cardiac:   Cardiac Monitoring: Yes           Access:  Current line(s): PIV     Genitourinary:   Urinary status: voiding    Respiratory:      Chronic home O2 use?: NO  Incentive spirometer at bedside: YES       GI:     Current diet:  ADULT DIET; Regular  ADULT ORAL NUTRITION SUPPLEMENT; Breakfast, Lunch, Dinner; Standard High Calorie/High Protein Oral Supplement  Passing flatus: YES  Tolerating current diet: YES       Pain Management:   Patient states pain is manageable on current regimen: NO    Skin:     Interventions: float heels, increase time out of bed, limit briefs, and nutritional support    Patient Safety:  Fall Score:    Interventions: bed/chair alarm, assistive device (walker, cane. etc), gripper socks, and pt to call before getting OOB       Length of Stay:  Expected LOS: 8  Actual LOS: 7      Rozina Caceres LPN

## 2024-11-13 NOTE — PROGRESS NOTES
Pulse oximetry assessment   92% at rest on room air (if 88% or less, skip next steps)  86% while ambulating on room air (room to exit doors and back)  94% at rest on 2LPM  89% while ambulating on 2LPM (5 laps around room)      Patient did desat as low as 83% while ambulating on RA and as low as 85% while ambulating on 2L.

## 2024-11-13 NOTE — PROGRESS NOTES
Pulmonary, Critical Care, and Sleep Medicine~Progress Note    Name: Fab Gonzalez MRN: [id number]   : 1960 Hospital: [facility]   Date: 2024 9:29 AM Admission: 2024     Impression Plan   Acute hypoxemic respiratory failure--on 2L NC this AM  Abnormal chest imaging: right pleural effusion and RLL atelectasis vs. superimposed pneumonia--s/p R thora on  with 250 ml cloudy yellow fluid drained, exudative and c/w empyema; ldh 2702, protein 4.6, low pH, very low glucose (1), elevated polys. Cx neg.   Repeat CT chest  with sight re-accumulation of fluid   Leukocytosis- improved 20.7 from 24.3, afebrile   COPD, not in an acute exacerbation  H/o lung CA  Polysubstance abuse: cocaine, ETOH  Hyponatremia Continue supplemental O2 to maintaine SpO2 >88%  Exercise oximetry did not demonstrate oxygen requirement   F/u pleural fluid cytology  Check CXR    Continue ceftriaxone day 8. Completed azithro. Treat with 4 week course of antibiotics. Transition to Augmentin at discharge  Continue scheduled duonebs  MBS with no aspiration/dysphagia   Completed prednisone burst   Outpatient PET scan, follow up with [facility] oncology        Interval History:     Patient seen this morning lying in bed. States his breathing got worse yesterday evening and this morning. He has not been out of bed since he feels so bad. He \"feels like he is dying.\" He still has occasional cough with clear sputum. On 2L NC. . Afebrile.      Patient seen this morning sitting up in bed. States his breathing is about the same as yesterday. Endorses dyspnea with exertion. Cough productive of clear sputum. No chest pain, edema, fever, chills.    : says he's not feeling well. He reports ALCANTARA, which is stable since thoracentesis but worse than his baseline. Says he feels like the fluid on his lung is increasing. Cough productive of scant clear/white mucus. He had a HA overnight.  PRN    Or    LORazepam (ATIVAN) injection 1 mg  1 mg IntraVENous Q1H PRN    Or    LORazepam (ATIVAN) tablet 2 mg  2 mg Oral Q1H PRN    Or    LORazepam (ATIVAN) injection 2 mg  2 mg IntraVENous Q1H PRN    Or    LORazepam (ATIVAN) tablet 3 mg  3 mg Oral Q1H PRN    Or    LORazepam (ATIVAN) injection 3 mg  3 mg IntraVENous Q1H PRN    Or    LORazepam (ATIVAN) tablet 4 mg  4 mg Oral Q1H PRN    Or    LORazepam (ATIVAN) injection 4 mg  4 mg IntraVENous Q1H PRN    arformoterol 15 mcg-budesonide 0.5 mg neb solution   Nebulization BID RT    magnesium sulfate 2000 mg in 50 mL IVPB premix  2,000 mg IntraVENous PRN    tamsulosin (FLOMAX) capsule 0.4 mg  0.4 mg Oral Daily    rosuvastatin (CRESTOR) tablet 20 mg  20 mg Oral Nightly    enoxaparin Sodium (LOVENOX) injection 30 mg  30 mg SubCUTAneous Daily       Labs:  ABG Invalid input(s): \"PHI\", \"PCO2I\", \"PO2I\", \"HCO3I\", \"SO2I\", \"FIO2I\"     CBC Recent Labs     11/11/24  0334 11/12/24  0414 11/13/24  0604   WBC 15.8* 24.3* 20.7*   HGB 11.8* 12.7 14.4   HCT 34.2* 38.0 42.6    348 371   MCV 91.4 93.8 93.2   MCH 31.6 31.4 31.5        Metabolic  Panel Recent Labs     11/11/24  0334 11/12/24  0414 11/13/24  0604   * 134* 133*   K 3.5 3.5 4.2    103 100   CO2 25 21 25   BUN 13 10 12   MG 1.6 1.4* 1.4*   PHOS 3.3 3.6 3.7        Pertinent Labs                Luanne Lobo NP

## 2024-11-14 ENCOUNTER — APPOINTMENT (OUTPATIENT)
Facility: HOSPITAL | Age: 64
End: 2024-11-14
Attending: INTERNAL MEDICINE
Payer: MEDICAID

## 2024-11-14 LAB
ANION GAP SERPL CALC-SCNC: 7 MMOL/L (ref 2–12)
BASOPHILS # BLD: 0 K/UL (ref 0–0.1)
BASOPHILS NFR BLD: 0 % (ref 0–1)
BUN SERPL-MCNC: 13 MG/DL (ref 6–20)
BUN/CREAT SERPL: 19 (ref 12–20)
CALCIUM SERPL-MCNC: 9.8 MG/DL (ref 8.5–10.1)
CHLORIDE SERPL-SCNC: 101 MMOL/L (ref 97–108)
CO2 SERPL-SCNC: 24 MMOL/L (ref 21–32)
CREAT SERPL-MCNC: 0.68 MG/DL (ref 0.7–1.3)
D DIMER PPP FEU-MCNC: 2.97 MG/L FEU (ref 0–0.65)
DIFFERENTIAL METHOD BLD: ABNORMAL
EOSINOPHIL # BLD: 0 K/UL (ref 0–0.4)
EOSINOPHIL NFR BLD: 0 % (ref 0–7)
ERYTHROCYTE [DISTWIDTH] IN BLOOD BY AUTOMATED COUNT: 12.7 % (ref 11.5–14.5)
GLUCOSE SERPL-MCNC: 189 MG/DL (ref 65–100)
HCT VFR BLD AUTO: 37.8 % (ref 36.6–50.3)
HGB BLD-MCNC: 13.2 G/DL (ref 12.1–17)
IMM GRANULOCYTES # BLD AUTO: 0.5 K/UL (ref 0–0.04)
IMM GRANULOCYTES NFR BLD AUTO: 2 % (ref 0–0.5)
LYMPHOCYTES # BLD: 2.3 K/UL (ref 0.8–3.5)
LYMPHOCYTES NFR BLD: 9 % (ref 12–49)
MAGNESIUM SERPL-MCNC: 1.6 MG/DL (ref 1.6–2.4)
MCH RBC QN AUTO: 31.4 PG (ref 26–34)
MCHC RBC AUTO-ENTMCNC: 34.9 G/DL (ref 30–36.5)
MCV RBC AUTO: 89.8 FL (ref 80–99)
MONOCYTES # BLD: 1.3 K/UL (ref 0–1)
MONOCYTES NFR BLD: 5 % (ref 5–13)
NEUTS SEG # BLD: 21.7 K/UL (ref 1.8–8)
NEUTS SEG NFR BLD: 84 % (ref 32–75)
NRBC # BLD: 0 K/UL (ref 0–0.01)
NRBC BLD-RTO: 0 PER 100 WBC
PHOSPHATE SERPL-MCNC: 2.5 MG/DL (ref 2.6–4.7)
PLATELET # BLD AUTO: 385 K/UL (ref 150–400)
PMV BLD AUTO: 8.4 FL (ref 8.9–12.9)
POTASSIUM SERPL-SCNC: 3.8 MMOL/L (ref 3.5–5.1)
RBC # BLD AUTO: 4.21 M/UL (ref 4.1–5.7)
RBC MORPH BLD: ABNORMAL
SODIUM SERPL-SCNC: 132 MMOL/L (ref 136–145)
WBC # BLD AUTO: 25.8 K/UL (ref 4.1–11.1)

## 2024-11-14 PROCEDURE — 85025 COMPLETE CBC W/AUTO DIFF WBC: CPT

## 2024-11-14 PROCEDURE — 6360000004 HC RX CONTRAST MEDICATION: Performed by: PHYSICIAN ASSISTANT

## 2024-11-14 PROCEDURE — 1100000000 HC RM PRIVATE

## 2024-11-14 PROCEDURE — 71275 CT ANGIOGRAPHY CHEST: CPT

## 2024-11-14 PROCEDURE — 94640 AIRWAY INHALATION TREATMENT: CPT

## 2024-11-14 PROCEDURE — 2700000000 HC OXYGEN THERAPY PER DAY

## 2024-11-14 PROCEDURE — 6370000000 HC RX 637 (ALT 250 FOR IP): Performed by: STUDENT IN AN ORGANIZED HEALTH CARE EDUCATION/TRAINING PROGRAM

## 2024-11-14 PROCEDURE — 6370000000 HC RX 637 (ALT 250 FOR IP): Performed by: INTERNAL MEDICINE

## 2024-11-14 PROCEDURE — 84100 ASSAY OF PHOSPHORUS: CPT

## 2024-11-14 PROCEDURE — 6360000002 HC RX W HCPCS: Performed by: PHYSICIAN ASSISTANT

## 2024-11-14 PROCEDURE — 80048 BASIC METABOLIC PNL TOTAL CA: CPT

## 2024-11-14 PROCEDURE — 94761 N-INVAS EAR/PLS OXIMETRY MLT: CPT

## 2024-11-14 PROCEDURE — 2580000003 HC RX 258: Performed by: PHYSICIAN ASSISTANT

## 2024-11-14 PROCEDURE — 85379 FIBRIN DEGRADATION QUANT: CPT

## 2024-11-14 PROCEDURE — 83735 ASSAY OF MAGNESIUM: CPT

## 2024-11-14 PROCEDURE — 6360000002 HC RX W HCPCS: Performed by: INTERNAL MEDICINE

## 2024-11-14 PROCEDURE — 36415 COLL VENOUS BLD VENIPUNCTURE: CPT

## 2024-11-14 PROCEDURE — 2580000003 HC RX 258: Performed by: INTERNAL MEDICINE

## 2024-11-14 RX ORDER — METRONIDAZOLE 500 MG/100ML
500 INJECTION, SOLUTION INTRAVENOUS EVERY 8 HOURS
Status: DISCONTINUED | OUTPATIENT
Start: 2024-11-14 | End: 2024-11-16 | Stop reason: HOSPADM

## 2024-11-14 RX ORDER — IOPAMIDOL 755 MG/ML
100 INJECTION, SOLUTION INTRAVASCULAR
Status: COMPLETED | OUTPATIENT
Start: 2024-11-14 | End: 2024-11-14

## 2024-11-14 RX ADMIN — PREDNISONE 40 MG: 20 TABLET ORAL at 10:27

## 2024-11-14 RX ADMIN — Medication 100 MG: at 10:27

## 2024-11-14 RX ADMIN — ARFORMOTEROL TARTRATE: 15 SOLUTION RESPIRATORY (INHALATION) at 07:11

## 2024-11-14 RX ADMIN — METRONIDAZOLE 500 MG: 500 INJECTION, SOLUTION INTRAVENOUS at 18:17

## 2024-11-14 RX ADMIN — IOPAMIDOL 100 ML: 755 INJECTION, SOLUTION INTRAVENOUS at 17:31

## 2024-11-14 RX ADMIN — IPRATROPIUM BROMIDE AND ALBUTEROL SULFATE 1 DOSE: .5; 3 SOLUTION RESPIRATORY (INHALATION) at 20:31

## 2024-11-14 RX ADMIN — SODIUM CHLORIDE, PRESERVATIVE FREE 10 ML: 5 INJECTION INTRAVENOUS at 10:28

## 2024-11-14 RX ADMIN — ENOXAPARIN SODIUM 30 MG: 100 INJECTION SUBCUTANEOUS at 10:26

## 2024-11-14 RX ADMIN — WATER 1000 MG: 1 INJECTION INTRAMUSCULAR; INTRAVENOUS; SUBCUTANEOUS at 21:49

## 2024-11-14 RX ADMIN — ARFORMOTEROL TARTRATE: 15 SOLUTION RESPIRATORY (INHALATION) at 20:31

## 2024-11-14 RX ADMIN — FAMOTIDINE 20 MG: 20 TABLET, FILM COATED ORAL at 21:50

## 2024-11-14 RX ADMIN — IPRATROPIUM BROMIDE AND ALBUTEROL SULFATE 1 DOSE: .5; 3 SOLUTION RESPIRATORY (INHALATION) at 07:15

## 2024-11-14 RX ADMIN — FOLIC ACID 1 MG: 1 TABLET ORAL at 10:27

## 2024-11-14 RX ADMIN — TAMSULOSIN HYDROCHLORIDE 0.4 MG: 0.4 CAPSULE ORAL at 10:28

## 2024-11-14 RX ADMIN — THERA TABS 1 TABLET: TAB at 10:27

## 2024-11-14 RX ADMIN — METRONIDAZOLE 500 MG: 500 INJECTION, SOLUTION INTRAVENOUS at 11:11

## 2024-11-14 RX ADMIN — SODIUM CHLORIDE, PRESERVATIVE FREE 10 ML: 5 INJECTION INTRAVENOUS at 21:50

## 2024-11-14 RX ADMIN — FAMOTIDINE 20 MG: 20 TABLET, FILM COATED ORAL at 10:27

## 2024-11-14 RX ADMIN — ROSUVASTATIN CALCIUM 20 MG: 20 TABLET, FILM COATED ORAL at 21:49

## 2024-11-14 ASSESSMENT — PAIN SCALES - GENERAL
PAINLEVEL_OUTOF10: 0
PAINLEVEL_OUTOF10: 2

## 2024-11-14 NOTE — PROGRESS NOTES
Pulmonary, Critical Care, and Sleep Medicine~Progress Note    Name: Fab Gonzalez MRN: [id number]   : 1960 Hospital: [facility]   Date: 2024 11:22 AM Admission: 2024     Impression Plan   Acute hypoxemic respiratory failure- Increased oxygen requirement with exertion yesterday and still tachycardic   Abnormal chest imaging: right pleural effusion and RLL atelectasis vs. superimposed pneumonia--s/p R thora on  with 250 ml cloudy yellow fluid drained, exudative and c/w empyema; ldh 2702, protein 4.6, low pH, very low glucose (1), elevated polys. Cx neg. Cytology with reactive mesothelial cells and mucopurulent debris. No malignant cells.   Repeat CT chest  with sight re-accumulation of fluid   Serial CXR have been stable  Leukocytosis- was improving, now uptrending in setting of restarting prednisone   COPD, not in an acute exacerbation  H/o lung CA  Polysubstance abuse: cocaine, ETOH  Hyponatremia Continue supplemental O2 to maintaine SpO2 >88%  Check ddimer   Added flagyl while inpatient with persistent leukocytosis although now on steroids. Continue ceftriaxone day 9. Completed azithro. Treat with 4 week course of antibiotics. Transition to Augmentin at discharge  Continue scheduled duonebs  Completed prednisone 5 days. Restarted prednisone .   MBS with no aspiration/dysphagia   Follow up with [facility] onc[facility]   Will need repeat imaging for resolution as an outpatient.   Will arrange follow up with pulmonary office after discharge.        Interval History:     Patient seen this morning lying in bed. States his breathing is much better than yesterday. He has occasional cough with clear sputum. Denies chest pain. Afebrile. On RA at rest. Yesterday desatted to 85% on 2L NC with exertion. Tachycardic ~100s.     Patient seen this morning lying in bed. States his breathing got worse yesterday evening and this morning. He has not been

## 2024-11-14 NOTE — PROGRESS NOTES
End of Shift Note    Bedside shift change report given to SUSANA Walker (oncoming nurse) by KIRILL DRUMMOND RN (offgoing nurse).  Report included the following information SBAR REPORTS LIST: SBAR, Intake/Output, MAR, Recent Results, and Cardiac Rhythm sinu    Shift worked:  [id number]     Shift summary and any significant changes:     Patient has tolerated nursing care and medicine well over night. He verbalized  right sided pain 2/10 , he required no pain medication.     Concerns for physician to address:  N/A     Zone phone for oncoming shift:   [phone]       Activity:  Activity: Ambulate with assistance  Number times ambulated in hallways past shift: 0  Number of times OOB to chair past shift: 0    Cardiac:   Cardiac Monitoring: YES / NO: Yes        Access:  Current line(s): IV ACCESS: - PIV    Genitourinary:   Urinary status: Urinary status: Patient is voiding without difficulty.    Respiratory:   oxygen delivery: 2 liters/min via nasal cannula  Chronic home O2 use?: YES / NO: No  Incentive spirometer at bedside: YES / NO: Yes      GI:  Current diet:  DIET: regular  Passing flatus: YES / NO: Yes  Tolerating current diet: YES / NO: Yes      Pain Management:   Patient states pain is manageable on current regimen: YES / NO: Yes    Skin:    Interventions: HARRIETT SCALE: 19    Patient Safety:  Fall Score: FALL RISK ASSESSMENT: At risk due to:  weakness  Interventions: Fall Interventions Provided: Implemented/recommended use of non-skid footwear, Implemented/recommended use of fall risk identification flag to all team members, Implemented/recommended assistive devices and encouraged their use, Implemented/recommended resources for alarm system (personal alarm, bed alarm, call bell, etc.) , Implemented/recommended environmental changes (remove hazards, lower bed, improve lighting, etc.), and Implemented/recommended increased supervision/assistance      Length of Stay:  Expected LOS: 8  Actual LOS: 8      KIRILL DRUMMOND  RN

## 2024-11-14 NOTE — PROGRESS NOTES
Hospitalist Progress Note    NAME:   Fab Gonzalez   : 1960   MRN: [id number]     Date/Time: 2024 1:20 PM  Patient PCP: Esperanza Diamond APRN - NP    Estimated discharge date: 11/15  Barriers: Pulm clearance, O2 improvement  O2 set up at [facility]?        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   Cont with duonebs  Cont antibiotics- rocephin. S/p azithromycin  Cont steroids- prednisone for total of 5 days  Procal 0.1  Oxygen prn  Cessation counseling given again on arrival to [facility]  WBC 26 -> 18  Rapid Flu and COVID test ng at [facility]  - wean O2, down to 1L  - CT chest ordered per Pulm, showed:  Right lung base consolidation with small right pleural effusion demonstrating post thoracentesis changes. The consolidation may be atelectatic or infectious. Panlobular emphysema.  - evalled by SLP  - MBS , normal swallowing. Did note small Zenker diverticulum  - Pulm monitoring, marked increase in WBC count from yesterday, monitor overnight for fevers and WBC count per Pulm  - consulted Onc, outpatient followup  - Augmentin on discharge for 4 weeks per Pulm  , hypoxic on ambulation, trial of prednisone 40 mg p.o. daily.    - cxr showed no acute pathology. Added prednisone for COPD, improved air entry today, likely will need longer taper of prednisone. Wbc increased possibly from steroids. Pulm on board, added flagyl  Still on 1 L NC     Moderate Right sided pleural effusion POA  Suspect Malignant Pleural Effusion given history of lung cancer stage 4 POA- in remission since  per pt, follows at [facility] oncology  Protein Calorie Malnutrition POA     Pt transferred to [facility] for R Thoracentesis for cytology to rule out metastatic disease.   Pt was

## 2024-11-15 LAB
ANION GAP SERPL CALC-SCNC: 7 MMOL/L (ref 2–12)
BASOPHILS # BLD: 0 K/UL (ref 0–0.1)
BASOPHILS NFR BLD: 0 % (ref 0–1)
BUN SERPL-MCNC: 12 MG/DL (ref 6–20)
BUN/CREAT SERPL: 24 (ref 12–20)
CALCIUM SERPL-MCNC: 9 MG/DL (ref 8.5–10.1)
CHLORIDE SERPL-SCNC: 104 MMOL/L (ref 97–108)
CO2 SERPL-SCNC: 25 MMOL/L (ref 21–32)
CREAT SERPL-MCNC: 0.49 MG/DL (ref 0.7–1.3)
DIFFERENTIAL METHOD BLD: ABNORMAL
EOSINOPHIL # BLD: 0.1 K/UL (ref 0–0.4)
EOSINOPHIL NFR BLD: 0 % (ref 0–7)
ERYTHROCYTE [DISTWIDTH] IN BLOOD BY AUTOMATED COUNT: 12.9 % (ref 11.5–14.5)
GLUCOSE SERPL-MCNC: 140 MG/DL (ref 65–100)
HCT VFR BLD AUTO: 33.5 % (ref 36.6–50.3)
HGB BLD-MCNC: 11.2 G/DL (ref 12.1–17)
IMM GRANULOCYTES # BLD AUTO: 0.3 K/UL (ref 0–0.04)
IMM GRANULOCYTES NFR BLD AUTO: 1 % (ref 0–0.5)
LYMPHOCYTES # BLD: 2.5 K/UL (ref 0.8–3.5)
LYMPHOCYTES NFR BLD: 12 % (ref 12–49)
MAGNESIUM SERPL-MCNC: 1.5 MG/DL (ref 1.6–2.4)
MCH RBC QN AUTO: 30.9 PG (ref 26–34)
MCHC RBC AUTO-ENTMCNC: 33.4 G/DL (ref 30–36.5)
MCV RBC AUTO: 92.5 FL (ref 80–99)
MONOCYTES # BLD: 1.5 K/UL (ref 0–1)
MONOCYTES NFR BLD: 7 % (ref 5–13)
NEUTS SEG # BLD: 17.4 K/UL (ref 1.8–8)
NEUTS SEG NFR BLD: 80 % (ref 32–75)
NRBC # BLD: 0 K/UL (ref 0–0.01)
NRBC BLD-RTO: 0 PER 100 WBC
PHOSPHATE SERPL-MCNC: 2.7 MG/DL (ref 2.6–4.7)
PLATELET # BLD AUTO: 353 K/UL (ref 150–400)
PMV BLD AUTO: 8.3 FL (ref 8.9–12.9)
POTASSIUM SERPL-SCNC: 3.4 MMOL/L (ref 3.5–5.1)
RBC # BLD AUTO: 3.62 M/UL (ref 4.1–5.7)
SODIUM SERPL-SCNC: 136 MMOL/L (ref 136–145)
WBC # BLD AUTO: 21.8 K/UL (ref 4.1–11.1)

## 2024-11-15 PROCEDURE — 6370000000 HC RX 637 (ALT 250 FOR IP): Performed by: STUDENT IN AN ORGANIZED HEALTH CARE EDUCATION/TRAINING PROGRAM

## 2024-11-15 PROCEDURE — 6360000002 HC RX W HCPCS: Performed by: PHYSICIAN ASSISTANT

## 2024-11-15 PROCEDURE — 6360000002 HC RX W HCPCS: Performed by: INTERNAL MEDICINE

## 2024-11-15 PROCEDURE — 94761 N-INVAS EAR/PLS OXIMETRY MLT: CPT

## 2024-11-15 PROCEDURE — 2580000003 HC RX 258: Performed by: PHYSICIAN ASSISTANT

## 2024-11-15 PROCEDURE — 2580000003 HC RX 258: Performed by: INTERNAL MEDICINE

## 2024-11-15 PROCEDURE — 83735 ASSAY OF MAGNESIUM: CPT

## 2024-11-15 PROCEDURE — 36415 COLL VENOUS BLD VENIPUNCTURE: CPT

## 2024-11-15 PROCEDURE — 6370000000 HC RX 637 (ALT 250 FOR IP): Performed by: INTERNAL MEDICINE

## 2024-11-15 PROCEDURE — 80048 BASIC METABOLIC PNL TOTAL CA: CPT

## 2024-11-15 PROCEDURE — 2700000000 HC OXYGEN THERAPY PER DAY

## 2024-11-15 PROCEDURE — 94640 AIRWAY INHALATION TREATMENT: CPT

## 2024-11-15 PROCEDURE — 84100 ASSAY OF PHOSPHORUS: CPT

## 2024-11-15 PROCEDURE — 1100000000 HC RM PRIVATE

## 2024-11-15 PROCEDURE — 85025 COMPLETE CBC W/AUTO DIFF WBC: CPT

## 2024-11-15 RX ADMIN — WATER 1000 MG: 1 INJECTION INTRAMUSCULAR; INTRAVENOUS; SUBCUTANEOUS at 20:45

## 2024-11-15 RX ADMIN — SODIUM CHLORIDE, PRESERVATIVE FREE 10 ML: 5 INJECTION INTRAVENOUS at 10:33

## 2024-11-15 RX ADMIN — IPRATROPIUM BROMIDE AND ALBUTEROL SULFATE 1 DOSE: .5; 3 SOLUTION RESPIRATORY (INHALATION) at 08:02

## 2024-11-15 RX ADMIN — PREDNISONE 40 MG: 20 TABLET ORAL at 10:32

## 2024-11-15 RX ADMIN — METRONIDAZOLE 500 MG: 500 INJECTION, SOLUTION INTRAVENOUS at 10:34

## 2024-11-15 RX ADMIN — ARFORMOTEROL TARTRATE: 15 SOLUTION RESPIRATORY (INHALATION) at 22:59

## 2024-11-15 RX ADMIN — ROSUVASTATIN CALCIUM 20 MG: 20 TABLET, FILM COATED ORAL at 20:45

## 2024-11-15 RX ADMIN — FAMOTIDINE 20 MG: 20 TABLET, FILM COATED ORAL at 20:45

## 2024-11-15 RX ADMIN — ENOXAPARIN SODIUM 30 MG: 100 INJECTION SUBCUTANEOUS at 10:32

## 2024-11-15 RX ADMIN — Medication 100 MG: at 10:32

## 2024-11-15 RX ADMIN — METRONIDAZOLE 500 MG: 500 INJECTION, SOLUTION INTRAVENOUS at 17:47

## 2024-11-15 RX ADMIN — TAMSULOSIN HYDROCHLORIDE 0.4 MG: 0.4 CAPSULE ORAL at 10:32

## 2024-11-15 RX ADMIN — SODIUM CHLORIDE, PRESERVATIVE FREE 10 ML: 5 INJECTION INTRAVENOUS at 21:32

## 2024-11-15 RX ADMIN — THERA TABS 1 TABLET: TAB at 10:32

## 2024-11-15 RX ADMIN — METRONIDAZOLE 500 MG: 500 INJECTION, SOLUTION INTRAVENOUS at 02:23

## 2024-11-15 RX ADMIN — ARFORMOTEROL TARTRATE: 15 SOLUTION RESPIRATORY (INHALATION) at 07:50

## 2024-11-15 RX ADMIN — FOLIC ACID 1 MG: 1 TABLET ORAL at 10:32

## 2024-11-15 RX ADMIN — FAMOTIDINE 20 MG: 20 TABLET, FILM COATED ORAL at 10:32

## 2024-11-15 RX ADMIN — IPRATROPIUM BROMIDE AND ALBUTEROL SULFATE 1 DOSE: .5; 3 SOLUTION RESPIRATORY (INHALATION) at 22:54

## 2024-11-15 ASSESSMENT — PAIN SCALES - GENERAL: PAINLEVEL_OUTOF10: 0

## 2024-11-15 NOTE — PLAN OF CARE
Problem: Discharge Planning  Goal: Discharge to home or other facility with appropriate resources  Outcome: Progressing     Problem: Respiratory - Adult  Goal: Achieves optimal ventilation and oxygenation  11/15/2024 1154 by Lilly Crowe RN  Outcome: Progressing  11/15/2024 0805 by Abigail Lazcano RCP  Outcome: Progressing     Problem: Safety - Adult  Goal: Free from fall injury  Outcome: Progressing     Problem: Pain  Goal: Verbalizes/displays adequate comfort level or baseline comfort level  Outcome: Progressing     Problem: Nutrition Deficit:  Goal: Optimize nutritional status  Outcome: Progressing

## 2024-11-15 NOTE — PROGRESS NOTES
Hospitalist Progress Note    NAME:   Fab Gonzalez   : 1960   MRN: [id number]     Date/Time: 11/15/2024 11:12 AM  Patient PCP: Esperanza Diamond APRN - NP    Estimated discharge date:   Barriers: Pulm clearance, O2 improvement          Assessment / Plan:    Acute COPD exacerbation POA- pt off oxygen now, was transiently hypoxic at [facility] on arrival in ER  Centrilobular Emphysema POA  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   Cont with duonebs  Cont antibiotics- rocephin. S/p azithromycin  Cont steroids- prednisone for total of 5 days  Procal 0.1  Oxygen prn  Cessation counseling given again on arrival to [facility]  WBC 26 -> 18  Rapid Flu and COVID test ng at [facility]  - wean O2, down to 1L  - CT chest ordered per Pulm, showed:  Right lung base consolidation with small right pleural effusion demonstrating post thoracentesis changes. The consolidation may be atelectatic or infectious. Panlobular emphysema.  - evalled by SLP  - MBS , normal swallowing. Did note small Zenker diverticulum  - Pulm monitoring, marked increase in WBC count from yesterday, monitor overnight for fevers and WBC count per Pulm  - consulted Onc, outpatient followup  - Augmentin on discharge for 4 weeks per Pulm  , hypoxic on ambulation, trial of prednisone 40 mg p.o. daily.    - cxr showed no acute pathology. Added prednisone for COPD, improved air entry today, likely will need longer taper of prednisone. Wbc increased possibly from steroids. Pulm on board, added flagyl  Still on 1 L NC  11/15, O2 requirement increased to 3 L, patient had oxygen challenge today, did well, desaturating on with ambulation, discussed with her, he will need home oxygen.  Plan to discharge tomorrow as long WBC is improving and oxygen requirement remained stable     Moderate Right sided pleural effusion POA  Suspect Malignant Pleural

## 2024-11-15 NOTE — PROGRESS NOTES
End of Shift Note    Bedside shift change report given to SUSANA Ramirez(oncoming nurse) by Sahil Gonzalez LPN (offgoing nurse).  Report included the following information SBAR REPORTS LIST: SBAR    Shift worked: 7P-7A     Shift summary and any significant changes:     No significant changes or concerns at this time.     Concerns for physician to address:       Zone phone for oncoming shift:   [phone]       Activity:    Number times ambulated in hallways past shift: 0  Number of times OOB to chair past shift: 0    Cardiac:   Cardiac Monitoring: YES / NO: Yes        Access:  Current line(s): PIV    Genitourinary:   Urinary status: Urinary status: Patient is voiding without difficulty.    Respiratory:   oxygen delivery: 2% O2 via nasal cannula  Chronic home O2 use?: YES / NO: Yes  Incentive spirometer at bedside: YES / NO: No      GI:  Current diet:  DIET: regular  Passing flatus: YES / NO: Yes  Tolerating current diet: YES / NO: Yes      Pain Management:   Patient states pain is manageable on current regimen: YES / NO: Yes    Skin:  TURNING, repositioning     Patient Safety:  Interventions: Fall Interventions Provided: Implemented/recommended use of non-skid footwear      Length of Stay:  Expected LOS: 9  Actual LOS: 9      Sahil Gonzalez LPN

## 2024-11-15 NOTE — PROGRESS NOTES
Pulmonary, Critical Care, and Sleep Medicine~Progress Note    Name: Fab Gonzalez MRN: [id number]   : 1960 Hospital: [facility]   Date: 11/15/2024 10:27 AM Admission: 2024     Impression Plan   Acute hypoxemic respiratory failure- fluctuating oxygen requirement   Abnormal chest imaging: right pleural effusion and RLL atelectasis vs. superimposed pneumonia--s/p R thora on  with 250 ml cloudy yellow fluid drained, exudative and c/w empyema; ldh 2702, protein 4.6, low pH, very low glucose (1), elevated polys. Cx neg. Cytology with reactive mesothelial cells and mucopurulent debris. No malignant cells.   Repeat CT chest  with sight re-accumulation of fluid   Serial CXR have been stable  CTA chest with no PE, stable R area of consolidation and pleural effusion   Leukocytosis- improving, on prednisone   COPD, not in an acute exacerbation  H/o lung CA  Polysubstance abuse: cocaine, ETOH  Hyponatremia Continue supplemental O2 to maintaine SpO2 >88%  Update exercise oximetry prior to discharge   Continue ceftriaxone day 10, flagyl day 2. Completed azithro. Treat with 4 week course of antibiotics. Transition to Augmentin at discharge  Continue scheduled duonebs  Completed prednisone 5 days. Restarted prednisone .   MBS with no aspiration/dysphagia   Follow up with [facility] oncology   Will need repeat imaging for resolution as an outpatient.   Okay for discharge from pulmonary standpoint after exercise oximetry. Will arrange follow up with pulmonary office after discharge.        Interval History:    11/15 Patient seen this morning lying in bed. States his breathing is unchanged. Has not been out of bed yet today. Occasional cough without much sputum production. No chest pain. Afebrile, required up to 4L NC today. Intermittent tachycardia around 100. WBC 21.8 (from 25.8).     Patient seen this morning lying in bed. States his breathing is much better  History     Tobacco Use    Smoking status: Some Days     Current packs/day: 0.25     Types: Cigarettes    Smokeless tobacco: Never   Substance Use Topics    Alcohol use: Yes    1/2ppd x 50 years, drinks 2 24 oz. beers/day. +cocaine use; he denies any other recreational/illicit drug use.    Family history: no known lung disease  OBJECTIVE:     Vital Signs:     BP 99/64   Pulse 92   Temp 97.9 °F (36.6 °C) (Oral)   Resp 19   Ht 1.676 m (5' 6\")   Wt 48.1 kg (106 lb)   SpO2 97%   BMI 17.11 kg/m²    Temp (24hrs), Av.8 °F (36.6 °C), Min:97.7 °F (36.5 °C), Max:97.9 °F (36.6 °C)     Intake/Output:     Last shift: No intake/output data recorded.    Last 3 shifts:  1901 - 11/15 0700  In: -   Out: 500 [Urine:500]      No intake or output data in the 24 hours ending 11/15/24 1027      Physical Exam:                                        Exam Findings Other   General: No resp distress noted, appears stated age, cachexia, mildly dyspneic    HEENT:  EOMI, poor dentition    Chest: No pectus deformity, normal chest rise b/l    HEART:  RRR, no murmurs/rubs/gallops    Lungs:  CTA b/l, no rhonchi/crackles/wheeze, diminished BS R base     ABD: Soft/NT, non rigid     EXT: No cyanosis/clubbing/edema,     Skin: No rashes or ulcers, no mottling    Neuro: A/O x 3        Medications:  Current Facility-Administered Medications   Medication Dose Route Frequency    metroNIDAZOLE (FLAGYL) 500 mg in 0.9% NaCl 100 mL IVPB premix  500 mg IntraVENous Q8H    predniSONE (DELTASONE) tablet 40 mg  40 mg Oral Daily    ipratropium 0.5 mg-albuterol 2.5 mg (DUONEB) nebulizer solution 1 Dose  1 Dose Inhalation BID RT    cefTRIAXone (ROCEPHIN) 1,000 mg in sterile water 10 mL IV syringe  1,000 mg IntraVENous Q24H    lidocaine 4 % external patch 1 patch  1 patch TransDERmal Daily PRN    sodium chloride flush 0.9 % injection 5-40 mL  5-40 mL IntraVENous 2 times per day    sodium chloride flush 0.9 % injection 5-40 mL  5-40 mL IntraVENous PRN

## 2024-11-15 NOTE — CARE COORDINATION
Transition of Care Plan:    RUR: 17% Moderate Risk  Prior Level of Functioning: Independent with ADL's  Disposition: Home   SARBJIT: 11/16  Follow up appointments: PCP follow up  DME needed: O2 ([employer])  Transportation at discharge: Sister to transport  IM/IMM Medicare/ letter given: 2 IM Given  Is patient a  and connected with VA? N/A  Caregiver Contact:   Sugey Mena [phone]  Discharge Caregiver contacted prior to discharge? Yes  Care Conference needed? N/A  Barriers to discharge:  Pulm Clearance    Initial Note: Chart Reviewed: Pt pending pulmonology clearance for d/c. CM has sent referral to [employer] for home o2. CM spoke with pts sister who has confirmed she will transport for discharge. CM will continue to follow.    TERE Welch,  [phone]

## 2024-11-15 NOTE — PROGRESS NOTES
.Pulse oximetry assessment   93% at rest on room air (if 88% or less, skip next steps)  87% while ambulating on room air , during standing came back 91%   95% at rest on 2 LPM  94% while ambulating on 2 LPM

## 2024-11-16 VITALS
BODY MASS INDEX: 17.04 KG/M2 | TEMPERATURE: 98.2 F | RESPIRATION RATE: 18 BRPM | OXYGEN SATURATION: 94 % | WEIGHT: 106 LBS | HEART RATE: 98 BPM | SYSTOLIC BLOOD PRESSURE: 100 MMHG | HEIGHT: 66 IN | DIASTOLIC BLOOD PRESSURE: 68 MMHG

## 2024-11-16 LAB
ANION GAP SERPL CALC-SCNC: 7 MMOL/L (ref 2–12)
BASOPHILS # BLD: 0.1 K/UL (ref 0–0.1)
BASOPHILS NFR BLD: 0 % (ref 0–1)
BUN SERPL-MCNC: 11 MG/DL (ref 6–20)
BUN/CREAT SERPL: 21 (ref 12–20)
CALCIUM SERPL-MCNC: 9.2 MG/DL (ref 8.5–10.1)
CHLORIDE SERPL-SCNC: 103 MMOL/L (ref 97–108)
CO2 SERPL-SCNC: 24 MMOL/L (ref 21–32)
CREAT SERPL-MCNC: 0.52 MG/DL (ref 0.7–1.3)
DIFFERENTIAL METHOD BLD: ABNORMAL
EOSINOPHIL # BLD: 0.1 K/UL (ref 0–0.4)
EOSINOPHIL NFR BLD: 0 % (ref 0–7)
ERYTHROCYTE [DISTWIDTH] IN BLOOD BY AUTOMATED COUNT: 12.8 % (ref 11.5–14.5)
GLUCOSE SERPL-MCNC: 123 MG/DL (ref 65–100)
HCT VFR BLD AUTO: 35.7 % (ref 36.6–50.3)
HGB BLD-MCNC: 11.9 G/DL (ref 12.1–17)
IMM GRANULOCYTES # BLD AUTO: 0.3 K/UL (ref 0–0.04)
IMM GRANULOCYTES NFR BLD AUTO: 1 % (ref 0–0.5)
LYMPHOCYTES # BLD: 2.9 K/UL (ref 0.8–3.5)
LYMPHOCYTES NFR BLD: 14 % (ref 12–49)
MAGNESIUM SERPL-MCNC: 1.4 MG/DL (ref 1.6–2.4)
MCH RBC QN AUTO: 30.7 PG (ref 26–34)
MCHC RBC AUTO-ENTMCNC: 33.3 G/DL (ref 30–36.5)
MCV RBC AUTO: 92 FL (ref 80–99)
MONOCYTES # BLD: 1.4 K/UL (ref 0–1)
MONOCYTES NFR BLD: 7 % (ref 5–13)
NEUTS SEG # BLD: 16.8 K/UL (ref 1.8–8)
NEUTS SEG NFR BLD: 78 % (ref 32–75)
NRBC # BLD: 0 K/UL (ref 0–0.01)
NRBC BLD-RTO: 0 PER 100 WBC
PHOSPHATE SERPL-MCNC: 2.8 MG/DL (ref 2.6–4.7)
PLATELET # BLD AUTO: 394 K/UL (ref 150–400)
PMV BLD AUTO: 8.2 FL (ref 8.9–12.9)
POTASSIUM SERPL-SCNC: 3.7 MMOL/L (ref 3.5–5.1)
RBC # BLD AUTO: 3.88 M/UL (ref 4.1–5.7)
SODIUM SERPL-SCNC: 134 MMOL/L (ref 136–145)
WBC # BLD AUTO: 21.5 K/UL (ref 4.1–11.1)

## 2024-11-16 PROCEDURE — 83735 ASSAY OF MAGNESIUM: CPT

## 2024-11-16 PROCEDURE — 94761 N-INVAS EAR/PLS OXIMETRY MLT: CPT

## 2024-11-16 PROCEDURE — 36415 COLL VENOUS BLD VENIPUNCTURE: CPT

## 2024-11-16 PROCEDURE — 6360000002 HC RX W HCPCS: Performed by: PHYSICIAN ASSISTANT

## 2024-11-16 PROCEDURE — 6370000000 HC RX 637 (ALT 250 FOR IP): Performed by: STUDENT IN AN ORGANIZED HEALTH CARE EDUCATION/TRAINING PROGRAM

## 2024-11-16 PROCEDURE — 94640 AIRWAY INHALATION TREATMENT: CPT

## 2024-11-16 PROCEDURE — 6360000002 HC RX W HCPCS: Performed by: INTERNAL MEDICINE

## 2024-11-16 PROCEDURE — 6370000000 HC RX 637 (ALT 250 FOR IP): Performed by: INTERNAL MEDICINE

## 2024-11-16 PROCEDURE — 85025 COMPLETE CBC W/AUTO DIFF WBC: CPT

## 2024-11-16 PROCEDURE — 80048 BASIC METABOLIC PNL TOTAL CA: CPT

## 2024-11-16 PROCEDURE — 2700000000 HC OXYGEN THERAPY PER DAY

## 2024-11-16 PROCEDURE — 84100 ASSAY OF PHOSPHORUS: CPT

## 2024-11-16 PROCEDURE — 2580000003 HC RX 258: Performed by: INTERNAL MEDICINE

## 2024-11-16 RX ORDER — PREDNISONE 10 MG/1
TABLET ORAL
Qty: 1 EACH | Refills: 0 | Status: SHIPPED | OUTPATIENT
Start: 2024-11-16

## 2024-11-16 RX ADMIN — METRONIDAZOLE 500 MG: 500 INJECTION, SOLUTION INTRAVENOUS at 09:35

## 2024-11-16 RX ADMIN — TAMSULOSIN HYDROCHLORIDE 0.4 MG: 0.4 CAPSULE ORAL at 09:28

## 2024-11-16 RX ADMIN — ARFORMOTEROL TARTRATE: 15 SOLUTION RESPIRATORY (INHALATION) at 07:25

## 2024-11-16 RX ADMIN — PREDNISONE 40 MG: 20 TABLET ORAL at 09:28

## 2024-11-16 RX ADMIN — SODIUM CHLORIDE, PRESERVATIVE FREE 10 ML: 5 INJECTION INTRAVENOUS at 09:30

## 2024-11-16 RX ADMIN — Medication 100 MG: at 09:28

## 2024-11-16 RX ADMIN — ENOXAPARIN SODIUM 30 MG: 100 INJECTION SUBCUTANEOUS at 09:28

## 2024-11-16 RX ADMIN — FAMOTIDINE 20 MG: 20 TABLET, FILM COATED ORAL at 09:28

## 2024-11-16 RX ADMIN — IPRATROPIUM BROMIDE AND ALBUTEROL SULFATE 1 DOSE: .5; 3 SOLUTION RESPIRATORY (INHALATION) at 07:35

## 2024-11-16 RX ADMIN — FOLIC ACID 1 MG: 1 TABLET ORAL at 09:28

## 2024-11-16 RX ADMIN — METRONIDAZOLE 500 MG: 500 INJECTION, SOLUTION INTRAVENOUS at 02:10

## 2024-11-16 RX ADMIN — SODIUM CHLORIDE: 9 INJECTION, SOLUTION INTRAVENOUS at 09:34

## 2024-11-16 RX ADMIN — ACETAMINOPHEN 650 MG: 325 TABLET ORAL at 09:28

## 2024-11-16 RX ADMIN — THERA TABS 1 TABLET: TAB at 09:28

## 2024-11-16 ASSESSMENT — PAIN DESCRIPTION - LOCATION: LOCATION: ABDOMEN

## 2024-11-16 ASSESSMENT — PAIN SCALES - GENERAL
PAINLEVEL_OUTOF10: 2
PAINLEVEL_OUTOF10: 6

## 2024-11-16 ASSESSMENT — PAIN DESCRIPTION - DESCRIPTORS: DESCRIPTORS: ACHING

## 2024-11-16 ASSESSMENT — PAIN DESCRIPTION - ORIENTATION: ORIENTATION: RIGHT;LOWER;MID

## 2024-11-16 NOTE — PROGRESS NOTES
End of Shift Note    Bedside shift change report given to SUSANA Marin (oncoming nurse) by Sahil Gonzalez LPN (offgoing nurse).  Report included the following information SBAR REPORTS LIST: SBAR    Shift worked:  7P-7A     Shift summary and any significant changes:     No significant changes during this shift      Concerns for physician to address:       Zone phone for oncoming shift:          Activity:  Activity: Ambulate with assistance  Number times ambulated in hallways past shift: 0  Number of times OOB to chair past shift: 0    Cardiac:   Cardiac Monitoring: YES / NO: Yes        Access:  Current line(s): PIV    Genitourinary:   Urinary status: Urinary status: Patient is voiding without difficulty.    Respiratory:   oxygen delivery: 3% O2 via nasal cannula  Chronic home O2 use?: YES / NO: Yes  Incentive spirometer at bedside: YES / NO: No      GI:  Current diet:  DIET: regular  Passing flatus: YES / NO: Yes  Tolerating current diet: YES / NO: Yes      Pain Management:   Patient states pain is manageable on current regimen: YES / NO: Yes    Skin:  Turning, repositioning     Patient Safety:  Fall Score: FALL RISK ASSESSMENT: At risk due to: SOB on exacerbation  Interventions: Fall Interventions Provided: Implemented/recommended use of fall risk identification flag to all team members      Length of Stay:  Expected LOS: 10  Actual LOS: 10      Sahil Gonzalez LPN

## 2024-11-16 NOTE — PLAN OF CARE
Problem: Discharge Planning  Goal: Discharge to home or other facility with appropriate resources  Outcome: Adequate for Discharge     Problem: Respiratory - Adult  Goal: Achieves optimal ventilation and oxygenation  11/16/2024 1408 by Tania Mead RN  Outcome: Adequate for Discharge  11/16/2024 0737 by Abigail Lazcano RCP  Outcome: Progressing     Problem: Safety - Adult  Goal: Free from fall injury  Outcome: Adequate for Discharge     Problem: Pain  Goal: Verbalizes/displays adequate comfort level or baseline comfort level  Outcome: Adequate for Discharge     Problem: Nutrition Deficit:  Goal: Optimize nutritional status  Outcome: Adequate for Discharge

## 2024-11-16 NOTE — CARE COORDINATION
Transition of Care Plan:    RUR: 16%-\"Moderate Risk\"  Prior Level of Functioning: Independent in his ADLs and IADLs  Disposition: Home with home O2  SARBJIT: 11/16/24  If SNF or IPR: Date FOC offered: N/A  Follow up appointments: PCP & Specialists as indicated   DME needed: [employer] has accepted the patient for home O2 arrangement. CM communicated with [employer] and the patient's portable tank will be delivered between 3-4 PM  Transportation at discharge: The patient's sister is at bedside and will be transporting him home  IM/IMM Medicare/ letter given: N/A, patient has Medicaid  Is patient a  and connected with VA? N/A   If yes, was  transfer form completed and VA notified?   Caregiver Contact: Sugey Schwartz, Sister, Phone: [phone]  Discharge Caregiver contacted prior to discharge? CM spoke to the patient's sister at bedside   Care Conference needed? No  Barriers to discharge: N/A, patient is being discharged today, 11/16/24    CM spoke to the patient and his sister at bedside and the patient stated that a portable O2 tank had not yet been delivered to him. CM contacted [employer] and they stated that they will deliver a portable O2 tank to the patient today, 11/16/24, between 3-4 PM. The patient's sister reported that she will transport him home. 2nd IM letter is not needed.     From CM perspective, the patient can d/c once O2 tank is delivered.     Swapna Fernandez, [facility]  [phone]

## 2024-11-16 NOTE — PROGRESS NOTES
ADULT PROTOCOL: JET AEROSOL ASSESSMENT    Patient  Fab Gonzalez     64 y.o.   male     11/16/2024  8:09 AM    Breath Sounds Pre Procedure: Breath Sounds Pre-Tx KIA: Diminished                                  Breath Sounds Pre-Tx LLL: Diminished        Breath Sounds Pre-Tx RUL: Diminished        Breath Sounds Pre-Tx RML: Diminished        Breath Sounds Pre-Tx RLL: Diminished  Breath Sounds Post Procedure: Breath Sounds Post-Tx KIA: Diminished          Breath Sounds Post-Tx LLL: Diminished          Breath Sounds Post-Tx RUL: Diminished          Breath Sounds Post-Tx RML: Diminished          Breath Sounds Post-Tx RLL: Diminished                                       Heart Rate: Pre procedure Pre-Tx Pulse: 72           Post procedure Post-Tx Pulse: 84    Resp Rate: Pre procedure Pre-Tx Resps: 18           Post procedure Post-Tx Resps: 18      Oxygen: O2 Therapy: Oxygen   nasal cannula     Changed: No    SpO2:  SpO2: 90 %   with Oxygen                Nebulizer Therapy: Current medications Medications: Albuterol/Ipratropium      Changed: No    Comments:     Problem List:   Patient Active Problem List   Diagnosis    COPD exacerbation (HCC)    Pneumonia due to organism    Pleural effusion on right       Respiratory Therapist: Abigail Lazcano RCP

## 2024-11-16 NOTE — DISCHARGE INSTRUCTIONS
HOSPITALIST DISCHARGE INSTRUCTIONS    NAME: Fab Gonzalez   :  1960   MRN:  [id number]     Date/Time:  2024 12:38 PM    ADMIT DATE: 2024   DISCHARGE DATE: 2024     Acute COPD exacerbation POA  Centrilobular Emphysema POA  Moderate Right sided pleural effusion POA  history of lung cancer stage 4 POA- in   Protein Calorie Malnutrition POA    It is important that you take the medication exactly as they are prescribed.   Keep your medication in the bottles provided by the pharmacist and keep a list of the medication names, dosages, and times to be taken in your wallet.   Do not take other medications without consulting your doctor.       What to do at Home    Recommended diet:  cardiac diet    Recommended activity: activity as tolerated      If you have questions regarding the hospital related prescriptions or hospital related issues please call [facility]' office at . You can always direct your questions to your primary care doctor if you are unable to reach your hospital physician; your PCP works as an extension of your hospital doctor just like your hospital doctor is an extension of your PCP for your time at the hospital ([facility])    If you experience any of the following symptoms then please call your primary care physician or return to the emergency room if you cannot get hold of your doctor:    Fever, chills, nausea, vomiting, or persistent diarrhea  Worsening weakness or new problems with your speech or balance  Dark stools or visible blood in your stools  New Leg swelling or shortness of breath as these could be signs of a clot    Additional Instructions:      Bring these papers with you to your follow up appointments. The papers will help your doctors be sure to continue the care plan from the hospital.              Information obtained by :  I understand that if any problems occur once I am at home I am to contact my physician.    I understand and

## 2024-11-16 NOTE — PROGRESS NOTES
End of Shift Note    Bedside shift change report given to ARNULFO Deras (oncoming nurse) by Lilly Crowe RN (offgoing nurse).  Report included the following information SBAR, Intake/output, MAR, Recent result.    Shift worked: Day   Shift summary and any significant changes:    -Patient has tolerated nursing care and medicine well during shift.   -Pt had BM  during shift.  -O2 challenge done.  -plan of care on going, Call bell within reach.   Concerns for physician to address:       Zone phone for oncoming shift:          Activity:  Activity: Ambulate with assistance  Number times ambulated in hallways past shift: 0  Number of times OOB to chair past shift: 0    Cardiac:   Cardiac Monitoring: YES / NO: no      Access:  Current line(s): IV ACCESS: - PIV    Genitourinary:   Urinary status: Urinary status: Patient is voiding without difficulty.    Respiratory:   oxygen delivery: 2 liters/min via nasal cannula PRN  Chronic home O2 use?: YES / NO: No  Incentive spirometer at bedside: YES / NO: Yes      GI:  Current diet:  DIET: regular  Passing flatus: YES / NO: Yes  Tolerating current diet: YES / NO: Yes      Pain Management:   Patient states pain is manageable on current regimen: YES / NO: Yes    Skin:    Interventions: HARRIETT SCALE: 19    Patient Safety:  Fall Score: FALL RISK ASSESSMENT: At risk due to:  weakness  Interventions: Fall Interventions Provided: Implemented/recommended use of non-skid footwear, Implemented/recommended use of fall risk identification flag to all team members, Implemented/recommended assistive devices and encouraged their use, Implemented/recommended resources for alarm system (personal alarm, bed alarm, call bell, etc.) , Implemented/recommended environmental changes (remove hazards, lower bed, improve lighting, etc.), and Implemented/recommended increased supervision/assistance      Length of Stay:  Expected LOS: 10  Actual LOS: 9      Lilly Crowe, RN

## 2024-11-16 NOTE — DISCHARGE SUMMARY
Discharge Summary    Name: Fab Gonzalez  [id number]  YOB: 1960 (Age: 64 y.o.)   Date of Admission: 11/6/2024  Date of Discharge: 11/16/2024  Attending Physician: Minh Crowe MD    Discharge Diagnosis:   Acute COPD exacerbation POA  Centrilobular Emphysema POA  Moderate Right sided pleural effusion POA  history of lung cancer stage 4 POA- in   Protein Calorie Malnutrition POA              Consultations:  IP CONSULT TO SOCIAL WORK  IP CONSULT TO CASE MANAGEMENT  IP CONSULT TO INTERVENTIONAL RADIOLOGY  IP CONSULT TO PULMONOLOGY  IP CONSULT TO HEMATOLOGY  IP CONSULT TO CASE MANAGEMENT      Brief Admission History/Reason for Admission Per Angel Crowe MD:       Brief Hospital Course by Main Problems:   Acute COPD exacerbation POA- pt off oxygen now, was transiently hypoxic at [facility] on arrival in ER  Centrilobular Emphysema POA  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   Cont with duonebs  Cont antibiotics- rocephin. S/p azithromycin  Cont steroids- prednisone for total of 5 days  Procal 0.1  Oxygen prn  Cessation counseling given again on arrival to [facility]  WBC 26 -> 18  Rapid Flu and COVID test ng at [facility]  - wean O2, down to 1L  - CT chest ordered per Pulm, showed:  Right lung base consolidation with small right pleural effusion demonstrating post thoracentesis changes. The consolidation may be atelectatic or infectious. Panlobular emphysema.  - evalled by SLP  - MBS 11/12, normal swallowing. Did note small Zenker diverticulum  - Pulm monitoring, marked increase in WBC count from yesterday, monitor overnight for fevers and WBC count per Pulm  - consulted Onc, outpatient followup  - Augmentin on discharge for 4 weeks per Pulm  11/13, hypoxic on ambulation, trial of prednisone 40 mg p.o. daily.    11/14- cxr showed no acute pathology. Added prednisone for COPD,

## 2024-11-16 NOTE — FLOWSHEET NOTE
11/16/24 1618   AVS Reviewed   AVS & discharge instructions reviewed with patient and/or representative? Yes   Reviewed instructions with Patient   Level of Understanding Questions answered;Verbalized understanding     Patient discharged home. Patient transported by family with all personal belongings.

## 2024-11-16 NOTE — PROGRESS NOTES
Physician Progress Note      PATIENT:               QUAN LOERA  CSN #:                  [id number]  :                       1960  ADMIT DATE:       2024 3:33 PM  DISCH DATE:        2024 4:19 PM  RESPONDING  PROVIDER #:        Minh Crowe MD          QUERY TEXT:    Pt admitted with COPD and has malnutrition documented in H&P . Please   further specify type of malnutrition with documentation in the medical record.    The medical record reflects the following:  Risk Factors: COPD  PNA  CA    Clinical Indicators:  Malnutrition Assessment:  Malnutrition Status:  Severe malnutrition (24 1331)  Context:  Chronic Illness  Findings of the 6 clinical characteristics of malnutrition:  Energy Intake:  Mild decrease in energy intake  Weight Loss:  No weight loss  Body Fat Loss:  Severe body fat loss Orbital, Buccal region, Triceps  Muscle Mass Loss:  Severe muscle mass loss Temples (temporalis), Clavicles   (pectoralis & deltoids)  Fluid Accumulation:  No fluid accumulation   Strength:  Not Performed    Anthropometric Measures:  Height: 167.6 cm (5' 6\")  Ideal Body Weight (IBW): 142 lbs (65 kg)  Current Body Weight: 48.1 kg (106 lb 0.7 oz),   IBW.  Current BMI (kg/m2): 17.1  Usual Body Weight: 47.6 kg (105 lb) (-107#)  % Weight Change (Calculated): 1  BMI Categories: Underweight (BMI less than 18.5)      Treatment:  Nutrition Recommendations/Plan:  1. Continue current diet and supplement      Thank you,  Emilia Arceo RN CDI CRCR  Clinical Documentation  [phone] or via Perfect Serve      ASPEN Criteria:    https://aspenjournals.onlinelibrary.flaherty.com/doi/full/10.1177/330806319935591  5  Options provided:  -- Severe Protein calorie malnutrition  -- Other - I will add my own diagnosis  -- Disagree - Not applicable / Not valid  -- Disagree - Clinically unable to determine / Unknown  -- Refer to Clinical Documentation Reviewer    PROVIDER RESPONSE TEXT:    This patient has severe protein

## 2025-07-02 ENCOUNTER — APPOINTMENT (OUTPATIENT)
Facility: HOSPITAL | Age: 65
End: 2025-07-02
Payer: MEDICAID

## 2025-07-02 ENCOUNTER — HOSPITAL ENCOUNTER (EMERGENCY)
Facility: HOSPITAL | Age: 65
Discharge: HOME | End: 2025-07-03
Attending: STUDENT IN AN ORGANIZED HEALTH CARE EDUCATION/TRAINING PROGRAM
Payer: MEDICAID

## 2025-07-02 DIAGNOSIS — L08.9 INFECTED SEBACEOUS CYST: Primary | ICD-10-CM

## 2025-07-02 DIAGNOSIS — L72.3 INFECTED SEBACEOUS CYST: Primary | ICD-10-CM

## 2025-07-02 LAB
ANION GAP BLD CALC-SCNC: 9 (ref 10–20)
CA-I BLD-MCNC: 1.2 MMOL/L (ref 1.15–1.33)
CHLORIDE BLD-SCNC: 96 MMOL/L (ref 100–111)
CO2 BLD-SCNC: 29 MMOL/L (ref 22–29)
CREAT UR-MCNC: 0.7 MG/DL (ref 0.6–1.3)
GLUCOSE BLD STRIP.AUTO-MCNC: 77 MG/DL (ref 74–99)
LACTATE BLD-SCNC: 1.64 MMOL/L (ref 0.4–2)
POTASSIUM BLD-SCNC: 3.7 MMOL/L (ref 3.5–5.5)
SERVICE CMNT-IMP: ABNORMAL
SODIUM BLD-SCNC: 134 MMOL/L (ref 136–145)
SPECIMEN SITE: ABNORMAL

## 2025-07-02 PROCEDURE — 99285 EMERGENCY DEPT VISIT HI MDM: CPT

## 2025-07-02 PROCEDURE — 74177 CT ABD & PELVIS W/CONTRAST: CPT

## 2025-07-02 PROCEDURE — 80047 BASIC METABLC PNL IONIZED CA: CPT

## 2025-07-02 PROCEDURE — 83605 ASSAY OF LACTIC ACID: CPT

## 2025-07-02 RX ORDER — IOPAMIDOL 755 MG/ML
100 INJECTION, SOLUTION INTRAVASCULAR
Status: COMPLETED | OUTPATIENT
Start: 2025-07-02 | End: 2025-07-03

## 2025-07-02 ASSESSMENT — PAIN DESCRIPTION - ORIENTATION: ORIENTATION: RIGHT

## 2025-07-02 ASSESSMENT — PAIN DESCRIPTION - PAIN TYPE: TYPE: ACUTE PAIN

## 2025-07-02 ASSESSMENT — PAIN SCALES - GENERAL: PAINLEVEL_OUTOF10: 10

## 2025-07-02 ASSESSMENT — PAIN DESCRIPTION - LOCATION: LOCATION: GROIN

## 2025-07-02 ASSESSMENT — PAIN DESCRIPTION - DESCRIPTORS: DESCRIPTORS: SHOOTING

## 2025-07-02 ASSESSMENT — PAIN - FUNCTIONAL ASSESSMENT: PAIN_FUNCTIONAL_ASSESSMENT: 0-10

## 2025-07-03 VITALS
RESPIRATION RATE: 18 BRPM | WEIGHT: 106.7 LBS | OXYGEN SATURATION: 97 % | SYSTOLIC BLOOD PRESSURE: 102 MMHG | TEMPERATURE: 98.2 F | HEIGHT: 66 IN | BODY MASS INDEX: 17.15 KG/M2 | HEART RATE: 78 BPM | DIASTOLIC BLOOD PRESSURE: 72 MMHG

## 2025-07-03 PROCEDURE — 6370000000 HC RX 637 (ALT 250 FOR IP): Performed by: STUDENT IN AN ORGANIZED HEALTH CARE EDUCATION/TRAINING PROGRAM

## 2025-07-03 PROCEDURE — 6360000004 HC RX CONTRAST MEDICATION: Performed by: STUDENT IN AN ORGANIZED HEALTH CARE EDUCATION/TRAINING PROGRAM

## 2025-07-03 PROCEDURE — 10061 I&D ABSCESS COMP/MULTIPLE: CPT

## 2025-07-03 PROCEDURE — 6360000002 HC RX W HCPCS: Performed by: STUDENT IN AN ORGANIZED HEALTH CARE EDUCATION/TRAINING PROGRAM

## 2025-07-03 RX ORDER — LIDOCAINE HYDROCHLORIDE AND EPINEPHRINE 10; 10 MG/ML; UG/ML
20 INJECTION, SOLUTION INFILTRATION; PERINEURAL ONCE
Status: COMPLETED | OUTPATIENT
Start: 2025-07-03 | End: 2025-07-03

## 2025-07-03 RX ORDER — CLINDAMYCIN HYDROCHLORIDE 150 MG/1
450 CAPSULE ORAL 3 TIMES DAILY
Qty: 63 CAPSULE | Refills: 0 | Status: SHIPPED | OUTPATIENT
Start: 2025-07-03 | End: 2025-07-10

## 2025-07-03 RX ORDER — OXYCODONE AND ACETAMINOPHEN 5; 325 MG/1; MG/1
1 TABLET ORAL
Refills: 0 | Status: COMPLETED | OUTPATIENT
Start: 2025-07-03 | End: 2025-07-03

## 2025-07-03 RX ORDER — IBUPROFEN 600 MG/1
600 TABLET, FILM COATED ORAL EVERY 8 HOURS PRN
Qty: 50 TABLET | Refills: 0 | Status: SHIPPED | OUTPATIENT
Start: 2025-07-03

## 2025-07-03 RX ADMIN — LIDOCAINE HYDROCHLORIDE,EPINEPHRINE BITARTRATE 20 ML: 10; .01 INJECTION, SOLUTION INFILTRATION; PERINEURAL at 01:04

## 2025-07-03 RX ADMIN — IOPAMIDOL 100 ML: 755 INJECTION, SOLUTION INTRAVENOUS at 00:04

## 2025-07-03 RX ADMIN — OXYCODONE HYDROCHLORIDE AND ACETAMINOPHEN 1 TABLET: 5; 325 TABLET ORAL at 01:05

## 2025-07-03 NOTE — ED PROVIDER NOTES
dressing applied.    Estimated blood loss: moderate  Complex (Probing, Breaking up Loculations, Packing or Drain Placement, Multiple Incisions)  The procedure took 1-15 minutes, and pt tolerated well  [JS]   0134 Reviewed his CT - notable fluid collection.  Drained at bedside [JS]      ED Course User Index  [JS] Hari Callejas MD         FINAL IMPRESSION     1. Infected sebaceous cyst          DISPOSITION/PLAN   Fab Gonzalez's  results have been reviewed with him.  He has been counseled regarding his diagnosis, treatment, and plan.  He verbally conveys understanding and agreement of the signs, symptoms, diagnosis, treatment and prognosis and additionally agrees to follow up as discussed.  He also agrees with the care-plan and conveys that all of his questions have been answered.  I have also provided discharge instructions for him that include: educational information regarding their diagnosis and treatment, and list of reasons why they would want to return to the ED prior to their follow-up appointment, should his condition change.     CLINICAL IMPRESSION    Discharge Note: The patient is stable for discharge home. The signs, symptoms, diagnosis, and discharge instructions have been discussed, understanding conveyed, and agreed upon. The patient is to follow up as recommended or return to ER should their symptoms worsen.      PATIENT REFERRED TO:  Esperanza Diamond APRN - NP  [address] [id number] [phone]    In 3 days      [facility]  [address] [facility] [address]  [phone]    If symptoms worsen       DISCHARGE MEDICATIONS:     Medication List        START taking these medications      clindamycin 150 MG capsule  Commonly known as: CLEOCIN  Take 3 capsules by mouth 3 times daily for 7 days     ibuprofen 600 MG tablet  Commonly known as: ADVIL;MOTRIN  Take 1 tablet by mouth every 8 hours as needed for Pain            ASK your doctor

## 2025-07-03 NOTE — ED TRIAGE NOTES
C/o lump to right groin since childhood but states it increased in size yesterday. Pt reports his groin has been irritating for the past week. Hx left inguinal hernia